# Patient Record
Sex: FEMALE | Race: OTHER | Employment: OTHER | URBAN - METROPOLITAN AREA
[De-identification: names, ages, dates, MRNs, and addresses within clinical notes are randomized per-mention and may not be internally consistent; named-entity substitution may affect disease eponyms.]

---

## 2017-01-26 DIAGNOSIS — M47.819 ARTHROPATHY OF FACET JOINT: ICD-10-CM

## 2017-01-26 DIAGNOSIS — M43.07 LUMBOSACRAL SPONDYLOLYSIS: Primary | ICD-10-CM

## 2017-01-26 DIAGNOSIS — M54.16 LUMBAR RADICULOPATHY: ICD-10-CM

## 2017-01-26 NOTE — TELEPHONE ENCOUNTER
Patient informed of 48 hour refill policy excluding weekends and holidays. Further explained patient will not receive a call back once prescription is ready. Justine-daughter will  medication at [place] office, [address].

## 2017-01-27 RX ORDER — HYDROCODONE BITARTRATE AND ACETAMINOPHEN 10; 325 MG/1; MG/1
1 TABLET ORAL EVERY 4 HOURS PRN
Qty: 120 TABLET | Refills: 0 | Status: SHIPPED | OUTPATIENT
Start: 2017-01-27 | End: 2017-02-23

## 2017-01-27 RX ORDER — MORPHINE SULFATE 15 MG/1
15 TABLET, FILM COATED, EXTENDED RELEASE ORAL EVERY 12 HOURS SCHEDULED
Qty: 60 TABLET | Refills: 0 | Status: SHIPPED | OUTPATIENT
Start: 2017-01-31 | End: 2017-02-23

## 2017-01-27 NOTE — TELEPHONE ENCOUNTER
Rx ready for , [address]     The following documentation was provided to patients at time of Rx pickup:      From the Office of Dr. Chelsi Erickson at [facility]:  [address] [facility] office is committed to providing the best care to our patients.    Due to

## 2017-01-27 NOTE — TELEPHONE ENCOUNTER
Medication: Morphine Sulfate ER 15 MG, Hydrocodone-Acetaminophen  MG    Date of last refill: Morphine 1/2/17,  Hydrocodone 12/24/16  [place] Reviewed Morphine 1/4/17, Hydrocodone 12/28/16    Last office visit: 9/22 using her cane for stability and I recommended that she moves about slowly and sit on the edge of her bed for a few minutes prior to walking about.     Radiology orders and consultations:None    The patient indicates understanding of these issues and agrees

## 2017-02-23 DIAGNOSIS — M47.819 ARTHROPATHY OF FACET JOINT: ICD-10-CM

## 2017-02-23 DIAGNOSIS — F11.90 NARCOTIC DRUG USE: ICD-10-CM

## 2017-02-23 DIAGNOSIS — M43.07 LUMBOSACRAL SPONDYLOLYSIS: Primary | ICD-10-CM

## 2017-02-23 DIAGNOSIS — M54.16 LUMBAR RADICULOPATHY: ICD-10-CM

## 2017-02-23 NOTE — TELEPHONE ENCOUNTER
Patient informed of 48 hour refill policy excluding weekends and holidays. Further explained patient will not receive a call back once prescription is ready. Pt's daughter will  medication at [place] office, [address].

## 2017-02-23 NOTE — TELEPHONE ENCOUNTER
Medication: Hydrocodone-Acetaminophen  MG, Morphine Sulfate ER 15 MG    Date of last refill: Hydrocodone 1/27/17,  Morphine 1/31/17  [place] Reviewed Hydrocodone 1/30/17,  Morphine 1/31/17    Last office visit: 9/2 continue using her cane for stability and I recommended that she moves about slowly and sit on the edge of her bed for a few minutes prior to walking about.     Radiology orders and consultations:None    The patient indicates understanding of these issues a

## 2017-02-24 RX ORDER — HYDROCODONE BITARTRATE AND ACETAMINOPHEN 10; 325 MG/1; MG/1
1 TABLET ORAL EVERY 6 HOURS PRN
Qty: 120 TABLET | Refills: 0 | Status: SHIPPED | OUTPATIENT
Start: 2017-02-28 | End: 2017-03-22

## 2017-02-24 RX ORDER — MORPHINE SULFATE 15 MG/1
15 TABLET, FILM COATED, EXTENDED RELEASE ORAL EVERY 12 HOURS SCHEDULED
Qty: 60 TABLET | Refills: 0 | Status: SHIPPED | OUTPATIENT
Start: 2017-03-01 | End: 2017-03-22

## 2017-02-24 NOTE — TELEPHONE ENCOUNTER
Pt need narcotic contract and urine drug screen- please let pt know she will need to see nurse to  rx. Pt needs date and time of last dose taken for norco and morphine ER. Order placed for UDS.

## 2017-02-27 ENCOUNTER — MED REC SCAN ONLY (OUTPATIENT)
Dept: SURGERY | Facility: CLINIC | Age: 82
End: 2017-02-27

## 2017-02-27 ENCOUNTER — APPOINTMENT (OUTPATIENT)
Dept: LAB | Age: 82
End: 2017-02-27
Attending: PHYSICIAN ASSISTANT
Payer: MEDICARE

## 2017-02-27 DIAGNOSIS — F11.90 NARCOTIC DRUG USE: ICD-10-CM

## 2017-02-27 PROCEDURE — 80307 DRUG TEST PRSMV CHEM ANLYZR: CPT

## 2017-02-27 NOTE — TELEPHONE ENCOUNTER
Medication: Gabapentin 300 MG    Date of last refill: 12/28/16  [place]    Last office visit: 9/22/16  Due back to clinic per last office note:  6 months  Date next office visit scheduled:  3/23/17    Last OV note rec minutes prior to walking about. Radiology orders and consultations:None    The patient indicates understanding of these issues and agrees to the plan. Return in about 6 months (around 3/22/2017). she is always welcome to return sooner if needed.     A

## 2017-02-27 NOTE — TELEPHONE ENCOUNTER
Signed narcotic contract with patient, verbalized understanding, copy of contract provided to patient. Pt reported taking Norco today at 12pm and Morphine yesterday 12am yesterday.

## 2017-03-02 LAB
6-ACETYLMORHINE CUTOFF 20 NG/ML: NOT DETECTED
7-AMINOCLONAZEPAM 40 NG/ML: NOT DETECTED
A-OH-ALPRAZOLM CUTOFF 20 NG/ML: NOT DETECTED
ALPRAZOLAM CUTOFF 40 NG/ML: NOT DETECTED
AMPHETAMINE CUTOFF 10 NG/ML: NOT DETECTED
BARBITURATES CUTOFF 200 NG/ML: NOT DETECTED
BENZOYLECGONINE  150 NG/ML: NOT DETECTED
BUPRENORPHINE CUTOFF 5 NG/ML: NOT DETECTED
CARISOPRODOL CUTOFF 100 NG/ML: NOT DETECTED
CODINE CUTOFF 40 NG/ML: NOT DETECTED
COLNAZEPAM CUTOFF 20 NG/ML: NOT DETECTED
CREATININE,URINE: 49.9 MG/DL
DIAZEPAM CUTOFF 50 NG/ML: NOT DETECTED
ETHYL-GLUCURONIDE 500 NG/ML: NOT DETECTED
FENTANYL CUTOFF 2 NG/ML: NOT DETECTED
HYDROCODONE CUTOFF 40 NG/ML: PRESENT
HYDROMORPHONE CUTOFF 40 NG/ML: NOT DETECTED
LORAZEPAM CUTOFF 60 NG/ML: NOT DETECTED
MARIJUANA CUTOFF 20 NG/ML: NOT DETECTED
MDA CUTOFF 200 NG/ML: NOT DETECTED
MDEA EVE CUTOFF 200 NG/ML: NOT DETECTED
MDMA-ECSTASY CUTOFF 200 NG/ML: NOT DETECTED
MEPERIDINE MET CUTOFF 50 NG/ML: NOT DETECTED
METHADONE CUTOFF 150 NG/ML: NOT DETECTED
METHAMPHETMN CUTOFF 400 NG/ML: NOT DETECTED
METHYLPHENIDATE (CUTOFF 100 NG/ML): NOT DETECTED
MIDAZOLAM CUTOFF 20 NG/ML: NOT DETECTED
MORHINE CUTOFF 20 NG/ML: PRESENT
NORBUPRENORPHINE  20 NG/ML: NOT DETECTED
NORDIAZEPAM CUTOFF 50 NG/ML: NOT DETECTED
NORFENTANYL CUTOFF 2 NG/ML: NOT DETECTED
NORHYDRCODONE CUTOFF 100 NG/ML: PRESENT
NOROXYCODONE CUTOFF 100 NG/ML: NOT DETECTED
NOROXYMORPHNE CUTOFF 100 NG/ML: NOT DETECTED
OXAZEPAM CUTOFF 50 NG/ML: NOT DETECTED
OXYCODONE CUTOFF 40 NG/ML: NOT DETECTED
OXYMORPHONE CUTOFF 40 NG/ML: NOT DETECTED
PCP CUTOFF 25 NG/ML: NOT DETECTED
PHENTERMINE CUTOFF 100 NG/ML: NOT DETECTED
PROPOXYPHENE CUTOFF 300 NG/ML: NOT DETECTED
TAPENTADOL CUTOFF 100 NG/ML: NOT DETECTED
TAPENTADOL-O SULF 200 NG/ML: NOT DETECTED
TEMAZEPAM CUTOFF 50 NG/ML: NOT DETECTED
TRAMADOL CUTOFF 200 NG/ML: NOT DETECTED
ZOLPIDEM CUTOFF 20 NG/ML: NOT DETECTED

## 2017-03-02 RX ORDER — GABAPENTIN 300 MG/1
CAPSULE ORAL
Qty: 90 CAPSULE | Refills: 0 | Status: SHIPPED | OUTPATIENT
Start: 2017-03-02 | End: 2017-03-22

## 2017-03-02 RX ORDER — GABAPENTIN 300 MG/1
CAPSULE ORAL
Qty: 90 CAPSULE | Refills: 0 | Status: SHIPPED | OUTPATIENT
Start: 2017-03-02 | End: 2017-03-30

## 2017-03-22 ENCOUNTER — OFFICE VISIT (OUTPATIENT)
Dept: SURGERY | Facility: CLINIC | Age: 82
End: 2017-03-22

## 2017-03-22 VITALS
SYSTOLIC BLOOD PRESSURE: 124 MMHG | DIASTOLIC BLOOD PRESSURE: 80 MMHG | RESPIRATION RATE: 16 BRPM | WEIGHT: 163 LBS | BODY MASS INDEX: 30 KG/M2 | HEIGHT: 62 IN | HEART RATE: 73 BPM

## 2017-03-22 DIAGNOSIS — M47.816 LUMBAR FACET ARTHROPATHY: ICD-10-CM

## 2017-03-22 DIAGNOSIS — M75.51 SUBDELTOID BURSITIS, RIGHT: ICD-10-CM

## 2017-03-22 DIAGNOSIS — M54.16 LUMBAR RADICULITIS: ICD-10-CM

## 2017-03-22 DIAGNOSIS — G58.8 INTERCOSTAL NEURITIS: Primary | ICD-10-CM

## 2017-03-22 DIAGNOSIS — M47.812 FACET ARTHROPATHY, CERVICAL: ICD-10-CM

## 2017-03-22 PROCEDURE — 99213 OFFICE O/P EST LOW 20 MIN: CPT | Performed by: NURSE PRACTITIONER

## 2017-03-22 RX ORDER — HYDROCODONE BITARTRATE AND ACETAMINOPHEN 10; 325 MG/1; MG/1
1 TABLET ORAL EVERY 6 HOURS PRN
Qty: 120 TABLET | Refills: 0 | Status: SHIPPED | OUTPATIENT
Start: 2017-03-29 | End: 2017-04-24

## 2017-03-22 RX ORDER — MORPHINE SULFATE 15 MG/1
15 TABLET, FILM COATED, EXTENDED RELEASE ORAL EVERY 12 HOURS SCHEDULED
Qty: 60 TABLET | Refills: 0 | Status: SHIPPED | OUTPATIENT
Start: 2017-03-30 | End: 2017-04-24

## 2017-03-22 RX ORDER — MORPHINE SULFATE 15 MG/1
15 TABLET, FILM COATED, EXTENDED RELEASE ORAL EVERY 12 HOURS SCHEDULED
Qty: 60 TABLET | Refills: 0 | Status: SHIPPED | OUTPATIENT
Start: 2017-03-30 | End: 2017-03-22

## 2017-03-22 NOTE — PATIENT INSTRUCTIONS
Refill policies:    • Allow 2 business days for refills; controlled substances may take longer.   • Contact your pharmacy at least 5 days prior to running out of medication and have them send an electronic request or submit request through the “request re your physician has recommended that you have a procedure or additional testing performed. [facility]) will contact your insurance carrier to obtain pre-certification or prior authorization.     Unfortunately, JEANNE has seen an increas

## 2017-03-26 NOTE — PROGRESS NOTES
Name: Shayy Celeste   : 1935   DOS: 3/26/2017     Pain Clinic Follow Up Visit:   Shayy Celeste is a 80year old female who presents for recheck of her chronic pain. She notes \"pain everywhere. \" She states her worst pain is in the neck, ri hours as needed for Pain. DO NOT FILL BEFORE  3/29/17 Disp: 120 tablet Rfl: 0   [START ON 3/30/2017] morphINE Sulfate ER 15 MG Oral Tab CR Take 1 tablet (15 mg total) by mouth every 12 (twelve) hours.  DO NOT FILL BEFORE 3/30/17 Disp: 60 tablet Rfl: 0   mary right with range of motion testing.       ASSESSMENT AND PLAN:   Intercostal neuritis  (primary encounter diagnosis)  Lumbar facet arthropathy  Lumbar radiculitis  Facet arthropathy, cervical  Subdeltoid bursitis, right     Treatment plan discussed with [facility] consultations:None    The patient indicates understanding of these issues and agrees to the plan. Return in about 6 months (around 9/22/2017), or if symptoms worsen or fail to improve.     DAJA Swenson, 3/26/2017, 10:02 AM

## 2017-03-27 ENCOUNTER — TELEPHONE (OUTPATIENT)
Dept: SURGERY | Facility: CLINIC | Age: 82
End: 2017-03-27

## 2017-03-27 NOTE — TELEPHONE ENCOUNTER
Trigger Point Injection    -procedure $157   -medication (methylprednisolone)-$98 based on age. Spoke to patient, advised total cost of TPI if not submitted to [facility] would be $255.  Patient requests office submit to [facility] to verify if they will pay, advise

## 2017-03-30 DIAGNOSIS — M43.07 LUMBOSACRAL SPONDYLOLYSIS: Primary | ICD-10-CM

## 2017-03-30 DIAGNOSIS — M54.16 LUMBAR RADICULOPATHY: ICD-10-CM

## 2017-03-30 RX ORDER — GABAPENTIN 300 MG/1
CAPSULE ORAL
Qty: 90 CAPSULE | Refills: 0 | Status: SHIPPED | OUTPATIENT
Start: 2017-03-30 | End: 2017-05-03

## 2017-03-30 NOTE — TELEPHONE ENCOUNTER
Medication: Gabapentin 300 mg     Date of last refill: 3/2/17  [place]    Last office visit: 3/22/17  Due back to clinic per last office note: 6 months   Date next office visit scheduled: None scheduled     Last OV no

## 2017-04-25 NOTE — TELEPHONE ENCOUNTER
Medication: Hydrocodone-Acetaminophen  MG,  Morphine Sulfate ER 15 MG    Date of last refill: both filled 3/30/17  [place] Reviewed both filled 3/30/17    Last office visit: 3/22/17  Due back to clinic per last off    Sig: Take 1 tablet (15 mg total) by mouth every 12 (twelve) hours. DO NOT FILL BEFORE 3/30/17            Risks and benefits of the medications were discussed with patient and her daughter today.  Patient should contact our office if this medication is

## 2017-04-26 RX ORDER — MORPHINE SULFATE 15 MG/1
15 TABLET, FILM COATED, EXTENDED RELEASE ORAL EVERY 12 HOURS SCHEDULED
Qty: 60 TABLET | Refills: 0 | Status: SHIPPED | OUTPATIENT
Start: 2017-04-28 | End: 2017-05-15

## 2017-04-26 RX ORDER — HYDROCODONE BITARTRATE AND ACETAMINOPHEN 10; 325 MG/1; MG/1
1 TABLET ORAL EVERY 6 HOURS PRN
Qty: 120 TABLET | Refills: 0 | Status: SHIPPED | OUTPATIENT
Start: 2017-04-28 | End: 2017-05-15

## 2017-05-03 NOTE — TELEPHONE ENCOUNTER
Medication: Gabapentin  Date of last refill: 3/30/17  [place]/A    Last office visit:3/22/17  Due back to clinic per last office note: 6 months  Date next office visit scheduled:  None Scheduled     Last OV note recomme (twelve) hours. DO NOT FILL BEFORE 3/30/17            Risks and benefits of the medications were discussed with patient and her daughter today. Patient should contact our office if this medication is not sufficient to help manage pain.      Radiology orders

## 2017-05-04 RX ORDER — GABAPENTIN 300 MG/1
CAPSULE ORAL
Qty: 90 CAPSULE | Refills: 0 | Status: SHIPPED | OUTPATIENT
Start: 2017-05-04 | End: 2017-06-01

## 2017-05-15 NOTE — TELEPHONE ENCOUNTER
Medication: Morphine Sulfate ER 15 MG,  Hydrocodone-Acetaminophen  MG    Date of last refill: both filled 4/28/17  [place] Reviewed Morphine Sulfate 3/30/17,  Norco 4/28/17    Last office visit: 3/22/17  Due back t Pain. DO NOT FILL BEFORE  3/29/17         morphINE Sulfate ER 15 MG Oral Tab CR  60 tablet  0       Sig: Take 1 tablet (15 mg total) by mouth every 12 (twelve) hours.  DO NOT FILL BEFORE 3/30/17            Risks and benefits of the medications were discusse

## 2017-05-16 RX ORDER — HYDROCODONE BITARTRATE AND ACETAMINOPHEN 10; 325 MG/1; MG/1
1 TABLET ORAL EVERY 6 HOURS PRN
Qty: 120 TABLET | Refills: 0 | Status: SHIPPED | OUTPATIENT
Start: 2017-05-27 | End: 2017-06-20

## 2017-05-16 RX ORDER — MORPHINE SULFATE 15 MG/1
15 TABLET, FILM COATED, EXTENDED RELEASE ORAL EVERY 12 HOURS SCHEDULED
Qty: 60 TABLET | Refills: 0 | Status: SHIPPED | OUTPATIENT
Start: 2017-05-16 | End: 2017-06-20

## 2017-05-16 NOTE — TELEPHONE ENCOUNTER
Rx's ready for pickup at  [address]      The following documentation was provided to patients at time of Rx pickup:      From the Office of Dr. Teagan Foreman at [facility]:  [address] [facility] office is committed to providing the best care to our patient

## 2017-06-01 NOTE — TELEPHONE ENCOUNTER
Medication: gabapentin 300 mg    Date of last refill: 5/4/17  [place]    Last office visit: 3/26/17  Due back to clinic per last office note: in 6 months (around 9/22/17) or if symptoms worsen  Date next office visit mg total) by mouth every 12 (twelve) hours. DO NOT FILL BEFORE 3/30/17            Risks and benefits of the medications were discussed with patient and her daughter today.  Patient should contact our office if this medication is not sufficient to help manag

## 2017-06-02 RX ORDER — GABAPENTIN 300 MG/1
CAPSULE ORAL
Qty: 90 CAPSULE | Refills: 0 | Status: SHIPPED | OUTPATIENT
Start: 2017-06-02 | End: 2017-06-27

## 2017-06-22 RX ORDER — HYDROCODONE BITARTRATE AND ACETAMINOPHEN 10; 325 MG/1; MG/1
1 TABLET ORAL EVERY 6 HOURS PRN
Qty: 120 TABLET | Refills: 0 | Status: SHIPPED | OUTPATIENT
Start: 2017-06-25 | End: 2017-07-21

## 2017-06-22 RX ORDER — MORPHINE SULFATE 15 MG/1
15 TABLET, FILM COATED, EXTENDED RELEASE ORAL EVERY 12 HOURS SCHEDULED
Qty: 60 TABLET | Refills: 0 | Status: SHIPPED | OUTPATIENT
Start: 2017-06-22 | End: 2017-07-21

## 2017-06-22 NOTE — TELEPHONE ENCOUNTER
Medication: Norco 10 mg and Morphine sulfate ER 15 mg    Date of last refill: Norco 5/27/17 and Morphine 5/16/17  [place] Reviewed Norco (5/27/17) and Morphine (3/30/17)    Last office visit: 3/22/17  Due back to clinic  morphINE Sulfate ER 15 MG Oral Tab CR  60 tablet  0       Sig: Take 1 tablet (15 mg total) by mouth every 12 (twelve) hours. DO NOT FILL BEFORE 3/30/17            Risks and benefits of the medications were discussed with patient and her daughter today.

## 2017-06-22 NOTE — TELEPHONE ENCOUNTER
Rxs ready for . The following documentation was provided to patients at time of Rx pickup:      [facility]  [address] [facility] office is committed to providing the best care to our patients.    Due to the high

## 2017-06-28 NOTE — TELEPHONE ENCOUNTER
Medication: Gabapentin 300 mg    Date of last refill: 6/2/17  [place]    Last office visit: 3/22/17  Due back to clinic per last office note: return in 6 months (around 9/22/17)  Date next office visit scheduled: 9/27 (twelve) hours. DO NOT FILL BEFORE 3/30/17           Risks and benefits of the medications were discussed with patient and her daughter today.  Patient should contact our office if this medication is not sufficient to help manage pain.      Radiology orders

## 2017-06-29 RX ORDER — GABAPENTIN 300 MG/1
CAPSULE ORAL
Qty: 90 CAPSULE | Refills: 0 | Status: SHIPPED | OUTPATIENT
Start: 2017-06-29 | End: 2017-07-21

## 2017-07-21 RX ORDER — MORPHINE SULFATE 15 MG/1
15 TABLET, FILM COATED, EXTENDED RELEASE ORAL EVERY 12 HOURS SCHEDULED
Qty: 60 TABLET | Refills: 0 | Status: SHIPPED | OUTPATIENT
Start: 2017-07-27 | End: 2017-08-23

## 2017-07-21 RX ORDER — HYDROCODONE BITARTRATE AND ACETAMINOPHEN 10; 325 MG/1; MG/1
1 TABLET ORAL EVERY 6 HOURS PRN
Qty: 120 TABLET | Refills: 0 | Status: SHIPPED | OUTPATIENT
Start: 2017-07-26 | End: 2017-08-23

## 2017-07-21 RX ORDER — GABAPENTIN 300 MG/1
CAPSULE ORAL
Qty: 90 CAPSULE | Refills: 0 | Status: SHIPPED | OUTPATIENT
Start: 2017-07-21 | End: 2017-08-23

## 2017-07-21 NOTE — TELEPHONE ENCOUNTER
Script ready for  at             The following documentation was provided to patients at time of Rx pickup:      From the Office of Dr. Justo Weber at [facility]:  [address] [facility] office is committed to providing the best care to our patie

## 2017-07-21 NOTE — TELEPHONE ENCOUNTER
Medication: Morphine Sulfate ER 15 MG, Hydrocodone-Acetaminophen  MG, Gabapentin 300 MG    Date of last refill: Morphine 6/22/17, Norco 6/25/17,  Gabapentin 300 MG 6/29/17  [place]): Reviewed Morphine 6/28/17,  Norco Pain. DO NOT FILL BEFORE  3/29/17        morphINE Sulfate ER 15 MG Oral Tab CR 60 tablet 0      Sig: Take 1 tablet (15 mg total) by mouth every 12 (twelve) hours.  DO NOT FILL BEFORE 3/30/17           Risks and benefits of the medications were discussed wit

## 2017-08-23 NOTE — TELEPHONE ENCOUNTER
Medication: Gabapentin 300mg, Hydrocodone-acetaminophen 10-325mg, morphine Sulfate 15mg    Date of last refill: Gabapentin 7/21/2017, Hydrocodone-acetaminophen 7/26/2017, Morphine 7/27/2017    [place] hydrocodone reviewe Oral Tab 120 tablet 0      Sig: Take 1 tablet by mouth every 6 (six) hours as needed for Pain. DO NOT FILL BEFORE  3/29/17        morphINE Sulfate ER 15 MG Oral Tab CR 60 tablet 0      Sig: Take 1 tablet (15 mg total) by mouth every 12 (twelve) hours.  DO N

## 2017-08-24 RX ORDER — HYDROCODONE BITARTRATE AND ACETAMINOPHEN 10; 325 MG/1; MG/1
1 TABLET ORAL EVERY 6 HOURS PRN
Qty: 120 TABLET | Refills: 0 | Status: SHIPPED | OUTPATIENT
Start: 2017-08-25 | End: 2017-09-13

## 2017-08-24 RX ORDER — GABAPENTIN 300 MG/1
CAPSULE ORAL
Qty: 90 CAPSULE | Refills: 0 | Status: SHIPPED | OUTPATIENT
Start: 2017-08-24 | End: 2017-09-27

## 2017-08-24 RX ORDER — MORPHINE SULFATE 15 MG/1
15 TABLET, FILM COATED, EXTENDED RELEASE ORAL EVERY 12 HOURS SCHEDULED
Qty: 60 TABLET | Refills: 0 | Status: SHIPPED | OUTPATIENT
Start: 2017-08-29 | End: 2017-09-13

## 2017-08-24 NOTE — TELEPHONE ENCOUNTER
RX ready for     The following documentation was provided to patients at time of Rx pickup:      From the Office of Dr. Jerris Cranker at [facility]:  [address] [facility] office is committed to providing the best care to our patients.    Due to the high vol

## 2017-09-13 ENCOUNTER — OFFICE VISIT (OUTPATIENT)
Dept: SURGERY | Facility: CLINIC | Age: 82
End: 2017-09-13

## 2017-09-13 VITALS
HEIGHT: 62 IN | SYSTOLIC BLOOD PRESSURE: 136 MMHG | DIASTOLIC BLOOD PRESSURE: 76 MMHG | HEART RATE: 78 BPM | WEIGHT: 165 LBS | BODY MASS INDEX: 30.36 KG/M2

## 2017-09-13 DIAGNOSIS — M46.1 SACROILIITIS (HCC): ICD-10-CM

## 2017-09-13 DIAGNOSIS — G58.8 INTERCOSTAL NEURALGIA: ICD-10-CM

## 2017-09-13 DIAGNOSIS — M47.816 LUMBAR FACET ARTHROPATHY: ICD-10-CM

## 2017-09-13 DIAGNOSIS — Z91.89 STROKE RISK: ICD-10-CM

## 2017-09-13 DIAGNOSIS — R51.9 NONINTRACTABLE HEADACHE, UNSPECIFIED CHRONICITY PATTERN, UNSPECIFIED HEADACHE TYPE: Primary | ICD-10-CM

## 2017-09-13 DIAGNOSIS — M54.16 LUMBAR RADICULOPATHY: ICD-10-CM

## 2017-09-13 PROCEDURE — 99213 OFFICE O/P EST LOW 20 MIN: CPT | Performed by: NURSE PRACTITIONER

## 2017-09-13 RX ORDER — HYDROCODONE BITARTRATE AND ACETAMINOPHEN 10; 325 MG/1; MG/1
1 TABLET ORAL EVERY 6 HOURS PRN
Qty: 120 TABLET | Refills: 0 | Status: SHIPPED | OUTPATIENT
Start: 2017-09-13 | End: 2017-10-13

## 2017-09-13 RX ORDER — MORPHINE SULFATE 15 MG/1
15 TABLET, FILM COATED, EXTENDED RELEASE ORAL EVERY 12 HOURS SCHEDULED
Qty: 60 TABLET | Refills: 0 | Status: SHIPPED | OUTPATIENT
Start: 2017-09-13 | End: 2017-10-13

## 2017-09-13 NOTE — PROGRESS NOTES
Name: Shawna Linares   : 1935   DOS: 2017     Pain Clinic Follow Up Visit:   Shawna Linares is a [de-identified]year old female who presents for recheck of her chronic low back pain.   Patient continues to experience bilateral lumbar and sacroiliac a sacroiliac area pain, left anterior rib pain. Neurologic: Right sided head pain. Denies weakness or bowel/bladder incontinence associated with the pain.       Current Outpatient Prescriptions:  morphINE Sulfate ER 15 MG Oral Tab CR Take 1 tablet (15 mg to mood.  HEENT: No tenderness to palpation over occipital area bilaterally or over the right frontal-parietal area. Skin over the skull is intact. No bruising or swelling is palpated on examination today.   Back/Trunk: Tenderness to palpation bilateral lumbar for treatment of pain [facility])         Risks and benefits of the medications were discussed with patient today. Patient should contact our office if this medication is not sufficient to help manage pain.      Radiology orders and consultations:NEURO - INT

## 2017-09-13 NOTE — PATIENT INSTRUCTIONS
Refill policies:    • Allow 2-3 business days for refills; controlled substances may take longer.   • Contact your pharmacy at least 5 days prior to running out of medication and have them send an electronic request or submit request through the [facility] have a procedure or additional testing performed. [facility]) will contact your insurance carrier to obtain pre-certification or prior authorization.     Unfortunately, JEANNE has seen an increase in denial of payment even though the p

## 2017-09-15 ENCOUNTER — TELEPHONE (OUTPATIENT)
Dept: SURGERY | Facility: CLINIC | Age: 82
End: 2017-09-15

## 2017-09-27 RX ORDER — GABAPENTIN 300 MG/1
CAPSULE ORAL
Qty: 90 CAPSULE | Refills: 5 | Status: SHIPPED | OUTPATIENT
Start: 2017-09-27 | End: 2017-10-26

## 2017-09-27 NOTE — TELEPHONE ENCOUNTER
Medication: Gabapentin 300mg    Date of last refill: 8/24/2017  [place] n[place]a    Last office visit: 9/13/2017  Due back to clinic per last office note:  Return in about 6 months (around 3/13/2018), or if symptoms worsen or times daily for treatment of pain [facility])           Risks and benefits of the medications were discussed with patient today.  Patient should contact our office if this medication is not sufficient to help manage pain.      Radiology orders and consultati

## 2017-10-17 ENCOUNTER — OFFICE VISIT (OUTPATIENT)
Dept: NEUROLOGY | Facility: CLINIC | Age: 82
End: 2017-10-17

## 2017-10-17 VITALS
BODY MASS INDEX: 30 KG/M2 | RESPIRATION RATE: 16 BRPM | DIASTOLIC BLOOD PRESSURE: 66 MMHG | WEIGHT: 166 LBS | SYSTOLIC BLOOD PRESSURE: 118 MMHG | HEART RATE: 68 BPM

## 2017-10-17 DIAGNOSIS — R47.9 TRANSIENT SPEECH DISTURBANCE: Primary | ICD-10-CM

## 2017-10-17 DIAGNOSIS — Z86.73 HISTORY OF STROKE: ICD-10-CM

## 2017-10-17 DIAGNOSIS — R29.90 STROKE-LIKE SYMPTOM: ICD-10-CM

## 2017-10-17 PROCEDURE — 99204 OFFICE O/P NEW MOD 45 MIN: CPT | Performed by: OTHER

## 2017-10-17 RX ORDER — LEVOTHYROXINE SODIUM 0.12 MG/1
125 TABLET ORAL DAILY
COMMUNITY
Start: 2017-09-27 | End: 2020-06-23

## 2017-10-17 RX ORDER — ASPIRIN 81 MG/1
81 TABLET ORAL DAILY
Qty: 30 TABLET | Refills: 3 | Status: SHIPPED | OUTPATIENT
Start: 2017-10-17 | End: 2018-06-08

## 2017-10-17 NOTE — PROGRESS NOTES
Neurology H&P    Deb Corley Patient Status:  No patient class for patient encounter    1935 MRN [id number]   Location [address] [place] [address], [address] [place] [address] Drive, [address] [facility] Attending No att. providers found   [address] Day # 0 PCP Paty Fernandez MD     [place] areas) three to four times daily (max 10.4ml daily) (Diclof) AND dispense Lidocaine 2.5%, Prilocaine 2.5% cream #480 grams.   Apply 4 grams three to four times daily for treatment of pain [facility]) Disp: 1 each Rfl: 11   aspirin 81 MG Oral Chew Tab Chew 81 Smoker                                                              Smokeless tobacco: Never Used                      Alcohol use: No                Family History:  No family history on file.     ROS:  Gen: no unexplained weight loss  Vision: no vision ch stiff gait, unable to toe or heel walk due to pain, unsteady tandem    Labs:       Imaging:  No rtecent CNS imaging    Assessment: This is an [de-identified] y/o female with multiple medical problems including previous stroke and vascular risk factors.  She has an exam

## 2017-10-17 NOTE — PROGRESS NOTES
Pt sent by Umesh Dias for evaluation of intermittent head pain on right side of head. Pt reports sudden onset of sharp stabbing pain that lasts only seconds, but causes vision \"black out,\" dizziness, and loss of balance.   Pt states it has been happenin

## 2017-10-23 ENCOUNTER — HOSPITAL ENCOUNTER (OUTPATIENT)
Dept: MRI IMAGING | Age: 82
Discharge: HOME | End: 2017-10-23
Attending: Other
Payer: MEDICARE

## 2017-10-23 ENCOUNTER — HOSPITAL ENCOUNTER (OUTPATIENT)
Dept: ULTRASOUND IMAGING | Age: 82
Discharge: HOME | End: 2017-10-23
Attending: Other
Payer: MEDICARE

## 2017-10-23 DIAGNOSIS — R47.9 TRANSIENT SPEECH DISTURBANCE: ICD-10-CM

## 2017-10-23 DIAGNOSIS — Z86.73 HISTORY OF STROKE: ICD-10-CM

## 2017-10-23 DIAGNOSIS — R29.90 STROKE-LIKE SYMPTOM: ICD-10-CM

## 2017-10-23 PROCEDURE — 70551 MRI BRAIN STEM W/O DYE: CPT | Performed by: OTHER

## 2017-10-23 PROCEDURE — 93880 EXTRACRANIAL BILAT STUDY: CPT | Performed by: OTHER

## 2017-10-24 ENCOUNTER — TELEPHONE (OUTPATIENT)
Dept: NEUROLOGY | Facility: CLINIC | Age: 82
End: 2017-10-24

## 2017-10-24 NOTE — TELEPHONE ENCOUNTER
Medication: Hydrocodone-acetaminophen 10-325mg, Morphine 15mg    Date of last refill: hydrocodone-acetaminophen 9/13/2017, morphine 9/13/2017    [place]): Hydrocodone-- reviewed 9/27/2017, Morphine reviewed 7/31/2017    La times daily (max 10.4ml daily) (Diclof) AND dispense Lidocaine 2.5%, Prilocaine 2.5% cream #480 grams.   Apply 4 grams three to four times daily for treatment of pain [facility])           Risks and benefits of the medications were discussed with patient tod

## 2017-10-24 NOTE — TELEPHONE ENCOUNTER
Please inform pt that last Gabapentin Rx was written with refills and pt just needs to call pharmacy to pick that up.

## 2017-10-24 NOTE — TELEPHONE ENCOUNTER
Patient notified of results below. She verbalized understanding. No further questions or concerns at this time.

## 2017-10-24 NOTE — TELEPHONE ENCOUNTER
----- Message from Vesta Hodges DO sent at 10/24/2017  8:26 AM CDT -----  Carotid US reports reviewed. <50% stenosis b/l ICAs.  No need for any acute intervention

## 2017-10-25 NOTE — TELEPHONE ENCOUNTER
Script ready for  at   RN NEEDED        The following documentation was provided to patients at time of Rx pickup:      From the Office of Dr. Vaibhav Bowden at [facility]:  [address] [facility] office is committed to providing the best care to ou

## 2017-10-25 NOTE — TELEPHONE ENCOUNTER
Please call pt and let her know she is not filling the morphine 15 mg ER regularly, if she does not need it she should not be taking it not as prescribed.  I understand there is a cost issue but it's not safe given pt is [de-identified] y.o and she has not filled the la

## 2017-10-26 ENCOUNTER — APPOINTMENT (OUTPATIENT)
Dept: LAB | Age: 82
End: 2017-10-26
Attending: Other
Payer: MEDICARE

## 2017-10-26 DIAGNOSIS — Z79.891 LONG TERM CURRENT USE OF OPIATE ANALGESIC: ICD-10-CM

## 2017-10-26 DIAGNOSIS — F11.90 NARCOTIC DRUG USE: ICD-10-CM

## 2017-10-26 PROCEDURE — 80307 DRUG TEST PRSMV CHEM ANLYZR: CPT

## 2017-10-26 RX ORDER — GABAPENTIN 300 MG/1
CAPSULE ORAL
Qty: 90 CAPSULE | Refills: 5 | Status: CANCELLED | OUTPATIENT
Start: 2017-10-26

## 2017-10-26 NOTE — TELEPHONE ENCOUNTER
Pt here in office for Rx pickup. Narcotic agreement already on file. States last dose of  vicodin was today at 11 am.  Pt sent to lab for urine specimen. Pt to return to pickup Rx.

## 2017-10-27 RX ORDER — GABAPENTIN 300 MG/1
CAPSULE ORAL
Qty: 90 CAPSULE | Refills: 5 | Status: SHIPPED | OUTPATIENT
Start: 2017-10-27 | End: 2017-12-28

## 2017-10-27 NOTE — TELEPHONE ENCOUNTER
Medication: gabapentin    Date of last refill: 9/27  [place]    Last office visit: 9/13  Due back to clinic per last office note:  3/2018  Date next office visit scheduled:  na    Last OV note recommendation:      Refer

## 2017-11-27 NOTE — TELEPHONE ENCOUNTER
Medication: Hydrocodone-acetaminophen 10-325mg    Date of last refill: 10/26/2017  [place] reviewed 25/14/1991    Last office visit: 9/13/2017  Due back to clinic per last office note:  Return in about 6 months (around 3 #480 grams. Apply 4 grams three to four times daily for treatment of pain [facility])           Risks and benefits of the medications were discussed with patient today.  Patient should contact our office if this medication is not sufficient to help manage p

## 2017-11-28 RX ORDER — HYDROCODONE BITARTRATE AND ACETAMINOPHEN 10; 325 MG/1; MG/1
1 TABLET ORAL EVERY 6 HOURS PRN
Qty: 120 TABLET | Refills: 0 | Status: SHIPPED | OUTPATIENT
Start: 2017-11-28 | End: 2017-12-26

## 2017-11-28 NOTE — TELEPHONE ENCOUNTER
Script ready for  at           The following documentation was provided to patients at time of Rx pickup:      From the Office of Dr. Teagan Foreman at [facility]:  [address] [facility] office is committed to providing the best care to our patient

## 2017-12-19 ENCOUNTER — OFFICE VISIT (OUTPATIENT)
Dept: NEUROLOGY | Facility: CLINIC | Age: 82
End: 2017-12-19

## 2017-12-19 VITALS
HEART RATE: 74 BPM | SYSTOLIC BLOOD PRESSURE: 150 MMHG | DIASTOLIC BLOOD PRESSURE: 96 MMHG | HEIGHT: 63 IN | RESPIRATION RATE: 16 BRPM | BODY MASS INDEX: 30.65 KG/M2 | WEIGHT: 173 LBS

## 2017-12-19 DIAGNOSIS — R47.9 TRANSIENT SPEECH DISTURBANCE: ICD-10-CM

## 2017-12-19 DIAGNOSIS — M54.16 LUMBAR RADICULOPATHY: ICD-10-CM

## 2017-12-19 DIAGNOSIS — Z86.73 HISTORY OF STROKE: ICD-10-CM

## 2017-12-19 DIAGNOSIS — M53.3 SACROILIAC JOINT DYSFUNCTION: ICD-10-CM

## 2017-12-19 DIAGNOSIS — M43.07 LUMBOSACRAL SPONDYLOLYSIS: ICD-10-CM

## 2017-12-19 DIAGNOSIS — R29.90 STROKE-LIKE SYMPTOM: ICD-10-CM

## 2017-12-19 DIAGNOSIS — M47.819 ARTHROPATHY OF FACET JOINT: ICD-10-CM

## 2017-12-19 PROCEDURE — 99213 OFFICE O/P EST LOW 20 MIN: CPT | Performed by: OTHER

## 2017-12-19 RX ORDER — ASPIRIN 81 MG/1
81 TABLET ORAL DAILY
Qty: 30 TABLET | Refills: 3 | Status: CANCELLED | OUTPATIENT
Start: 2017-12-19

## 2017-12-19 RX ORDER — OMEPRAZOLE 40 MG/1
CAPSULE, DELAYED RELEASE ORAL
COMMUNITY
Start: 2017-11-30 | End: 2019-02-01

## 2017-12-19 RX ORDER — SIMVASTATIN 10 MG
10 TABLET ORAL NIGHTLY
Qty: 90 TABLET | Refills: 3 | Status: SHIPPED | OUTPATIENT
Start: 2017-12-19 | End: 2020-06-23

## 2017-12-19 RX ORDER — ASPIRIN 81 MG/1
81 TABLET ORAL DAILY
Qty: 90 TABLET | Refills: 3 | Status: SHIPPED | OUTPATIENT
Start: 2017-12-19 | End: 2021-08-05

## 2017-12-19 NOTE — PATIENT INSTRUCTIONS
Refill policies:    • Allow 2-3 business days for refills; controlled substances may take longer.   • Contact your pharmacy at least 5 days prior to running out of medication and have them send an electronic request or submit request through the [facility] have a procedure or additional testing performed. [facility]) will contact your insurance carrier to obtain pre-certification or prior authorization.     Unfortunately, JEANNE has seen an increase in denial of payment even though the p

## 2017-12-19 NOTE — PROGRESS NOTES
Neurology H&P    Simeoncurt Vincent Patient Status:  No patient class for patient encounter    1935 MRN [id number]   Location ED [facility], [address], [address] [facility] Attending No att. providers found   [facility] Day # 0 PCP Yakelin Yun MD     [place]. She states that she never started this medication because she did not know that it was at the pharmacy. She denies any new stroke like symptoms. She denies nay numbness weakness or tingling. She denies any slurred speech or facial weakness.      Gautam Wang speech disturbance     Stroke-like symptom      PMHx:  Past Medical History:   Diagnosis Date   • Asthma    • [facility]    • GERD (gastroesophageal reflux disease)    • Hypothyroid    • MRSA (methicillin resistant staph aureus) culture positi or cyanosis      Neurologic:   MENTAL STATUS: alert, ox3, normal attention, language and fund of knowledge.       CRANIAL NERVES II to XII: PERRLA, no ptosis or diplopia, EOM intact, facial sensation intact, strong eye closure and lower facial muscles & jaw 10mg QHS  - Lipid panel was never done and I asked her to complete this again    Marcelo Holly, DO  Neurology

## 2017-12-26 NOTE — TELEPHONE ENCOUNTER
Medication: Hydrocodone-acetaminophen 10-325mg    Date of last refill: 11/28/2017  [place] reviewed 46/67/4018    Last office visit: 9/13/2017  Due back to clinic per last office note:  Return in about 6 months (around 3 cream #480 grams. Apply 4 grams three to four times daily for treatment of pain [facility])           Risks and benefits of the medications were discussed with patient today.  Patient should contact our office if this medication is not sufficient to help ma

## 2017-12-27 RX ORDER — HYDROCODONE BITARTRATE AND ACETAMINOPHEN 10; 325 MG/1; MG/1
1 TABLET ORAL EVERY 6 HOURS PRN
Qty: 120 TABLET | Refills: 0 | Status: SHIPPED | OUTPATIENT
Start: 2017-12-29 | End: 2018-01-24

## 2017-12-27 NOTE — TELEPHONE ENCOUNTER
Script ready for  at           The following documentation was provided to patients at time of Rx pickup:      From the Office of Dr. Velia Schultz at [facility]:  [address] [facility] office is committed to providing the best care to our patient

## 2017-12-29 ENCOUNTER — TELEPHONE (OUTPATIENT)
Dept: SURGERY | Facility: CLINIC | Age: 82
End: 2017-12-29

## 2017-12-29 NOTE — TELEPHONE ENCOUNTER
No answer. Calling Pharmacy to check on number of refills left. Original script given in October had 5 refills so there should still be 2 refills left.

## 2018-01-02 RX ORDER — GABAPENTIN 300 MG/1
CAPSULE ORAL
Qty: 90 CAPSULE | Refills: 5 | Status: SHIPPED | OUTPATIENT
Start: 2018-01-02 | End: 2018-01-24

## 2018-01-02 NOTE — TELEPHONE ENCOUNTER
Medication: Gabapentin 300mg    Date of last refill: 10/27/2017  [place]    Last office visit: 9/13/2017  Due back to clinic per last office note:  Return in about 6 months (around 3/13/2018), or if symptoms worsen o three to four times daily for treatment of pain [facility])           Risks and benefits of the medications were discussed with patient today.  Patient should contact our office if this medication is not sufficient to help manage pain.      Radiology orders

## 2018-01-24 NOTE — TELEPHONE ENCOUNTER
Medication: Hydrocodone-acetaminophen 10-325mg, Gabapentin 300mg    Date of last refill: Hydrocodone 12/29/2017, Gabapentin 1/2/2018  [place] reviewed 88/24/2827    Last office visit: 9/13/2017  Due back to clinic per la AND dispense Lidocaine 2.5%, Prilocaine 2.5% cream #480 grams. Apply 4 grams three to four times daily for treatment of pain [facility])           Risks and benefits of the medications were discussed with patient today.  Patient should contact our office if

## 2018-01-25 RX ORDER — HYDROCODONE BITARTRATE AND ACETAMINOPHEN 10; 325 MG/1; MG/1
1 TABLET ORAL EVERY 6 HOURS PRN
Qty: 120 TABLET | Refills: 0 | Status: SHIPPED | OUTPATIENT
Start: 2018-01-28 | End: 2018-02-21

## 2018-01-25 RX ORDER — GABAPENTIN 300 MG/1
CAPSULE ORAL
Qty: 90 CAPSULE | Refills: 5 | Status: SHIPPED | OUTPATIENT
Start: 2018-01-25 | End: 2018-02-21

## 2018-01-25 NOTE — TELEPHONE ENCOUNTER
Script ready for  at  [address]        The following documentation was provided to patients at time of Rx pickup:      [facility]  [address] [facility] office is committed to providing the best care to our pa

## 2018-02-21 NOTE — TELEPHONE ENCOUNTER
Patient's daughter requesting Norco refill and gabapentin refill. Patient informed of 48 hour refill policy excluding weekends and holidays. Informed patient only patient can  the prescription effective April 1, 2017.   Further explained patient will

## 2018-02-21 NOTE — TELEPHONE ENCOUNTER
Medication: Gabapentin 300mg, Hydrocodone-acetaminophen 10/325mg    Date of last refill: Gabapentin 1/25/2018, Hydrocodone 1/28/2018  [place] reviewed Hydrocodone 1/31/2018    Last office visit: 9/13/2017  Due back to cl to 2 areas) three to four times daily (max 10.4ml daily) (Diclof) AND dispense Lidocaine 2.5%, Prilocaine 2.5% cream #480 grams.   Apply 4 grams three to four times daily for treatment of pain [facility])           Risks and benefits of the medications were

## 2018-02-23 RX ORDER — GABAPENTIN 300 MG/1
CAPSULE ORAL
Qty: 90 CAPSULE | Refills: 5 | Status: SHIPPED | OUTPATIENT
Start: 2018-02-23 | End: 2018-03-26

## 2018-02-23 RX ORDER — HYDROCODONE BITARTRATE AND ACETAMINOPHEN 10; 325 MG/1; MG/1
1 TABLET ORAL EVERY 6 HOURS PRN
Qty: 120 TABLET | Refills: 0 | Status: SHIPPED | OUTPATIENT
Start: 2018-03-01 | End: 2018-03-06

## 2018-02-23 NOTE — TELEPHONE ENCOUNTER
Prescription ready for     The following documentation was provided to patients at time of Rx pickup:    From the Office of Shaggy Cuellar and Viridiana Draper at [facility]:  [address] [facility] office is committed to providing the best care to our patients.    D

## 2018-03-06 ENCOUNTER — OFFICE VISIT (OUTPATIENT)
Dept: SURGERY | Facility: CLINIC | Age: 83
End: 2018-03-06

## 2018-03-06 VITALS
DIASTOLIC BLOOD PRESSURE: 84 MMHG | WEIGHT: 173 LBS | HEIGHT: 63 IN | BODY MASS INDEX: 30.65 KG/M2 | HEART RATE: 80 BPM | SYSTOLIC BLOOD PRESSURE: 152 MMHG

## 2018-03-06 DIAGNOSIS — M79.18 MYOFASCIAL PAIN: ICD-10-CM

## 2018-03-06 DIAGNOSIS — M46.1 SACROILIITIS (HCC): ICD-10-CM

## 2018-03-06 DIAGNOSIS — M47.816 LUMBAR FACET ARTHROPATHY: ICD-10-CM

## 2018-03-06 DIAGNOSIS — M47.812 ARTHROPATHY OF CERVICAL FACET JOINT: ICD-10-CM

## 2018-03-06 DIAGNOSIS — M70.61 GREATER TROCHANTERIC BURSITIS OF RIGHT HIP: ICD-10-CM

## 2018-03-06 DIAGNOSIS — M17.0 OSTEOARTHRITIS OF BOTH KNEES, UNSPECIFIED OSTEOARTHRITIS TYPE: ICD-10-CM

## 2018-03-06 DIAGNOSIS — M47.27 OSTEOARTHRITIS OF SPINE WITH RADICULOPATHY, LUMBOSACRAL REGION: ICD-10-CM

## 2018-03-06 DIAGNOSIS — M47.812 OSTEOARTHRITIS OF CERVICAL SPINE, UNSPECIFIED SPINAL OSTEOARTHRITIS COMPLICATION STATUS: Primary | ICD-10-CM

## 2018-03-06 PROCEDURE — 99214 OFFICE O/P EST MOD 30 MIN: CPT | Performed by: NURSE PRACTITIONER

## 2018-03-06 RX ORDER — HYDROCODONE BITARTRATE AND ACETAMINOPHEN 10; 325 MG/1; MG/1
1 TABLET ORAL EVERY 6 HOURS PRN
Qty: 120 TABLET | Refills: 0 | COMMUNITY
Start: 2018-03-06 | End: 2018-03-26

## 2018-03-06 RX ORDER — TIZANIDINE 2 MG/1
2 TABLET ORAL EVERY 8 HOURS PRN
Qty: 60 TABLET | Refills: 0 | Status: SHIPPED | OUTPATIENT
Start: 2018-03-06 | End: 2018-06-08

## 2018-03-06 NOTE — PROGRESS NOTES
Name: Winnie Sands   : 1935   DOS: 3/6/2018     Pain Clinic Follow Up Visit:   Winnie Sands is a [de-identified]year old female who presents for recheck of medications for her chronic low back, neck, right hip, and bilateral knee pain.  She is here wi yellow in color and now it is white. She asked her pharmacist who assured her it is still Norco but in fact a different .  She stated that he offerred to get her the previous  but it would take a couple of days so she tried the new o SR 24 Hr  Disp:  Rfl:    Albuterol Sulfate HFA (PROAIR HFA) 108 (90 BASE) MCG/ACT Inhalation Aero Soln Inhale 1 puff into the lungs every 6 (six) hours as needed for Wheezing.  Disp:  Rfl:    Losartan Potassium-HCTZ (HYZAAR) 50-12.5 MG Oral Tab Take 1 table hours if needed for pain. I have discussed with her that she should not take this medication if she does not need it, to limit side effects. She should notify our office if she continues to experience severe constipation or dizziness.  I have noted for next

## 2018-03-06 NOTE — PATIENT INSTRUCTIONS
Refill policies:    • Allow 2-3 business days for refills; controlled substances may take longer.   • Contact your pharmacy at least 5 days prior to running out of medication and have them send an electronic request or submit request through the [facility] recommended that you have a procedure or additional testing performed. [facility]) will contact your insurance carrier to obtain pre-certification or prior authorization.     Unfortunately, [facility] has seen an increase in denial of paym

## 2018-03-26 NOTE — TELEPHONE ENCOUNTER
Medication: Norco, gabapentin 300    Date of last refill: 3/6/18 Norco, WITH A FILL DATE OF 3/31/18   2/23/18 Gabapentin    [place] 0/0/53    Last office visit: 3/6/18  Due back to clinic per last office note:  Return in Oral Tab 60 tablet 0      Sig: Take 1 tablet (2 mg total) by mouth every 8 (eight) hours as needed (for muscle spasm).           Risks and benefits of the medications were discussed with patient today.  Patient should contact our office if this medication i

## 2018-03-27 NOTE — TELEPHONE ENCOUNTER
**RN NEDDED**    The following documentation was provided to patients at time of Rx pickup:    From the Office of Shaggy Cuellar and Viridiana Draper at [facility]:  [address] [facility] office is committed to providing the best care to our patients.    Due to the high vo

## 2018-03-27 NOTE — TELEPHONE ENCOUNTER
Please document last dose pain medication, have pt supply urine specimen prior to Rx pickup. Thank you.

## 2018-03-28 ENCOUNTER — APPOINTMENT (OUTPATIENT)
Dept: LAB | Age: 83
End: 2018-03-28
Attending: NURSE PRACTITIONER
Payer: MEDICARE

## 2018-03-28 DIAGNOSIS — Z79.891 LONG TERM CURRENT USE OF OPIATE ANALGESIC: ICD-10-CM

## 2018-03-28 DIAGNOSIS — F11.90 CHRONIC NARCOTIC USE: ICD-10-CM

## 2018-03-28 PROCEDURE — 80307 DRUG TEST PRSMV CHEM ANLYZR: CPT

## 2018-03-28 NOTE — TELEPHONE ENCOUNTER
Pt here in office for Rx pickup. Narcotic agreement already on file. States last dose of Norco was (today) 3/28/2018 at 8am.  Pt sent to lab for urine specimen. Pt to return to pickup Rx.

## 2018-04-24 RX ORDER — HYDROCODONE BITARTRATE AND ACETAMINOPHEN 10; 325 MG/1; MG/1
1 TABLET ORAL EVERY 6 HOURS PRN
Qty: 120 TABLET | Refills: 0 | Status: SHIPPED | OUTPATIENT
Start: 2018-04-25 | End: 2018-05-23

## 2018-04-25 NOTE — TELEPHONE ENCOUNTER
Prescription ready for     The following documentation was provided to patients at time of Rx pickup:    From the Office of Shaggy Condon and Millicent Maki at [facility]:  [address] [facility] office is committed to providing the best care to our patients.    D

## 2018-05-23 NOTE — TELEPHONE ENCOUNTER
Refill 2 SCRIPTS: Norco & gabapentin      Patient informed of 48 hour refill policy excluding weekends and holidays. Informed patient only patient can  the prescription effective April 1, 2017.   Further explained patient will not receive a call back

## 2018-05-24 RX ORDER — HYDROCODONE BITARTRATE AND ACETAMINOPHEN 10; 325 MG/1; MG/1
1 TABLET ORAL EVERY 6 HOURS PRN
Qty: 120 TABLET | Refills: 0 | Status: SHIPPED | OUTPATIENT
Start: 2018-06-02 | End: 2018-06-26

## 2018-05-24 RX ORDER — GABAPENTIN 300 MG/1
CAPSULE ORAL
Qty: 90 CAPSULE | Refills: 0 | Status: SHIPPED | OUTPATIENT
Start: 2018-05-24 | End: 2018-06-08 | Stop reason: DRUGHIGH

## 2018-05-24 NOTE — TELEPHONE ENCOUNTER
Medication: HYDROcodone-acetaminophen  MG and gabapentin 300 MG    **Patient should still have refills of gabapentin**    Date of last refill:HYDROcodone-acetaminophen  4/25/18, gabapentin 300 MG- 3/27/18 w/5 refills     [place]  Signed Prescriptions Disp Refills    TiZANidine HCl 2 MG Oral Tab 60 tablet 0      Sig: Take 1 tablet (2 mg total) by mouth every 8 (eight) hours as needed (for muscle spasm).            Risks and benefits of the medications were discussed with tip

## 2018-05-25 ENCOUNTER — TELEPHONE (OUTPATIENT)
Dept: SURGERY | Facility: CLINIC | Age: 83
End: 2018-05-25

## 2018-05-25 DIAGNOSIS — M79.672 FOOT PAIN, BILATERAL: Primary | ICD-10-CM

## 2018-05-25 DIAGNOSIS — M79.671 FOOT PAIN, BILATERAL: Primary | ICD-10-CM

## 2018-05-25 DIAGNOSIS — M25.571 BILATERAL ANKLE PAIN, UNSPECIFIED CHRONICITY: ICD-10-CM

## 2018-05-25 DIAGNOSIS — M25.572 BILATERAL ANKLE PAIN, UNSPECIFIED CHRONICITY: ICD-10-CM

## 2018-06-07 ENCOUNTER — HOSPITAL ENCOUNTER (OUTPATIENT)
Dept: GENERAL RADIOLOGY | Facility: HOSPITAL | Age: 83
Discharge: HOME | End: 2018-06-07
Attending: NURSE PRACTITIONER
Payer: MEDICARE

## 2018-06-07 DIAGNOSIS — M25.571 BILATERAL ANKLE PAIN, UNSPECIFIED CHRONICITY: ICD-10-CM

## 2018-06-07 DIAGNOSIS — M25.572 BILATERAL ANKLE PAIN, UNSPECIFIED CHRONICITY: ICD-10-CM

## 2018-06-07 DIAGNOSIS — M79.672 FOOT PAIN, BILATERAL: ICD-10-CM

## 2018-06-07 DIAGNOSIS — M79.671 FOOT PAIN, BILATERAL: ICD-10-CM

## 2018-06-07 PROCEDURE — 73610 X-RAY EXAM OF ANKLE: CPT | Performed by: NURSE PRACTITIONER

## 2018-06-07 PROCEDURE — 73630 X-RAY EXAM OF FOOT: CPT | Performed by: NURSE PRACTITIONER

## 2018-06-08 ENCOUNTER — OFFICE VISIT (OUTPATIENT)
Dept: SURGERY | Facility: CLINIC | Age: 83
End: 2018-06-08

## 2018-06-08 VITALS
SYSTOLIC BLOOD PRESSURE: 110 MMHG | HEART RATE: 68 BPM | WEIGHT: 178 LBS | HEIGHT: 59.06 IN | BODY MASS INDEX: 35.88 KG/M2 | DIASTOLIC BLOOD PRESSURE: 68 MMHG

## 2018-06-08 DIAGNOSIS — M19.071 OSTEOARTHRITIS OF BOTH ANKLES, UNSPECIFIED OSTEOARTHRITIS TYPE: Primary | ICD-10-CM

## 2018-06-08 DIAGNOSIS — M19.071 OSTEOARTHRITIS OF BOTH FEET, UNSPECIFIED OSTEOARTHRITIS TYPE: ICD-10-CM

## 2018-06-08 DIAGNOSIS — M72.2 BILATERAL PLANTAR FASCIITIS: ICD-10-CM

## 2018-06-08 DIAGNOSIS — M19.072 OSTEOARTHRITIS OF BOTH ANKLES, UNSPECIFIED OSTEOARTHRITIS TYPE: Primary | ICD-10-CM

## 2018-06-08 DIAGNOSIS — M19.072 OSTEOARTHRITIS OF BOTH FEET, UNSPECIFIED OSTEOARTHRITIS TYPE: ICD-10-CM

## 2018-06-08 PROBLEM — M25.579 JOINT PAIN OF ANKLE AND FOOT, UNSPECIFIED LATERALITY: Status: ACTIVE | Noted: 2018-06-08

## 2018-06-08 PROCEDURE — 99214 OFFICE O/P EST MOD 30 MIN: CPT | Performed by: NURSE PRACTITIONER

## 2018-06-08 RX ORDER — TIZANIDINE 2 MG/1
2 TABLET ORAL EVERY 8 HOURS PRN
Qty: 60 TABLET | Refills: 0 | Status: SHIPPED | OUTPATIENT
Start: 2018-06-08 | End: 2018-10-29

## 2018-06-08 RX ORDER — GABAPENTIN 400 MG/1
400 CAPSULE ORAL 3 TIMES DAILY
Qty: 90 CAPSULE | Refills: 0 | Status: SHIPPED | OUTPATIENT
Start: 2018-06-08 | End: 2018-07-08

## 2018-06-08 NOTE — PATIENT INSTRUCTIONS
Refill policies:    • Allow 2-3 business days for refills; controlled substances may take longer.   • Contact your pharmacy at least 5 days prior to running out of medication and have them send an electronic request or submit request through the “request re entire amount billed. Precertification and Prior Authorizations: If your physician has recommended that you have a procedure or additional testing performed.   [facility]) will contact your insurance carrier to obtain pre-certi

## 2018-06-08 NOTE — PROGRESS NOTES
Name: Jeanne Hankins   : 1935   DOS: 2018     Pain Clinic Follow Up Visit:   Jeanne Hankins is a 82year old female who presents for recheck of her chronic low back pain.   She has more recently been experiencing bilateral ankle and foot p muscle spasm). Disp: 60 tablet Rfl: 0   HYDROcodone-acetaminophen  MG Oral Tab Take 1 tablet by mouth every 6 (six) hours as needed for Pain.  DO NOT FILL UNTIL 6/2/2018 Disp: 120 tablet Rfl: 0   Omeprazole 40 MG Oral Capsule Delayed Release  Disp:  R worse on the medial side. X-rays as noted below were discussed with patient today. PROCEDURE:  XR FOOT, COMPLETE (MIN 3 VIEWS), LEFT (CPT=73630)     TECHNIQUE:  AP, oblique, and lateral views were obtained. COMPARISON:  None. INDICATIONS:  M79. along the tip of the lateral malleolus suggesting remote ligamentous injury. Dictated by: aMlaika Puri DO on 6/07/2018 at 11:39       Approved by:  Qian Vázquez DO         PROCEDURE:  XR ANKLE (MIN 3 VIEWS) LEFT (CPT=73610)     TECHNIQUE:  Three process.      Dictated by: Wisam Duggan MD on 6/07/2018 at 12:13       Approved by: Wisam Duggan MD              ASSESSMENT AND PLAN:   Osteoarthritis of both ankles, unspecified osteoarthritis type  (primary encounter diagnosis)  Osteoarthritis of omar consultations:None     She would like to consult with a podiatrist for a second opinion regarding her foot pain.   She states that she was recommended orthopedic shoes in the past.  She has contact information for a podiatrist whom she would like to schedul

## 2018-06-26 RX ORDER — HYDROCODONE BITARTRATE AND ACETAMINOPHEN 10; 325 MG/1; MG/1
1 TABLET ORAL EVERY 6 HOURS PRN
Qty: 120 TABLET | Refills: 0 | Status: SHIPPED | OUTPATIENT
Start: 2018-07-04 | End: 2018-07-23

## 2018-06-26 NOTE — TELEPHONE ENCOUNTER
Medication: HYDROcodone-acetaminophen  MG Oral Tab    Date of last refill: written 5/24/18 to fill 6/2/18  [place] 8/0/43 ET verified    Last office visit: 6/8/18  Due back to clinic per last office note: Return in including the risk for oversedation, overdose, and death.   Patient should contact our office if this medication is not sufficient to help manage pain.      We are happy to order physical therapy, she prefers to defer at this time.      Radiology orders and

## 2018-06-26 NOTE — TELEPHONE ENCOUNTER
[place] refill  Patient informed of 48 hour refill policy excluding weekends and holidays. Informed patient only patient can  the prescription effective April 1, 2017. Further explained patient will not receive a call back once prescription is ready.

## 2018-07-23 NOTE — TELEPHONE ENCOUNTER
Medication: HYDROcodone-acetaminophen  MG    Date of last refill: 7/4/18    [place] 4/6/57    Last office visit: 6/8/18  Due back to clinic per last office note:  3 months (around 9/8/2018).   Date next office visit death.   Patient should contact our office if this medication is not sufficient to help manage pain.      We are happy to order physical therapy, she prefers to defer at this time.      Radiology orders and consultations:None      She would like to consult

## 2018-07-24 RX ORDER — HYDROCODONE BITARTRATE AND ACETAMINOPHEN 10; 325 MG/1; MG/1
1 TABLET ORAL EVERY 6 HOURS PRN
Qty: 120 TABLET | Refills: 0 | Status: SHIPPED | OUTPATIENT
Start: 2018-08-03 | End: 2018-08-23

## 2018-08-23 RX ORDER — HYDROCODONE BITARTRATE AND ACETAMINOPHEN 10; 325 MG/1; MG/1
1 TABLET ORAL EVERY 6 HOURS PRN
Qty: 120 TABLET | Refills: 0 | Status: SHIPPED | OUTPATIENT
Start: 2018-09-01 | End: 2018-09-24

## 2018-08-23 NOTE — TELEPHONE ENCOUNTER
Medication: HYDROcodone-acetaminophen  MG Oral Tab  pt's daughter is req refill for Norco    Date of last refill:written 7/24/18 to fill 8/3/18  [place] 4/5/34 ET anthony on site    Last office visit: 6/8/18  Due back muscle spasm).           Risks and benefits of the medications including risks for dizziness or sleepiness related to gabapentin and tizanidine were discussed with patient today.   She is aware of risks related to opioid medications including the risk for o

## 2018-08-31 ENCOUNTER — OFFICE VISIT (OUTPATIENT)
Dept: PAIN CLINIC | Facility: CLINIC | Age: 83
End: 2018-08-31
Payer: MEDICARE

## 2018-08-31 VITALS
OXYGEN SATURATION: 95 % | HEIGHT: 61 IN | SYSTOLIC BLOOD PRESSURE: 120 MMHG | DIASTOLIC BLOOD PRESSURE: 78 MMHG | HEART RATE: 68 BPM

## 2018-08-31 DIAGNOSIS — M25.551 BILATERAL HIP PAIN: ICD-10-CM

## 2018-08-31 DIAGNOSIS — R10.30 LOWER ABDOMINAL PAIN: ICD-10-CM

## 2018-08-31 DIAGNOSIS — Z86.39 HISTORY OF THYROID DISORDER: ICD-10-CM

## 2018-08-31 DIAGNOSIS — M54.16 LUMBAR RADICULOPATHY: ICD-10-CM

## 2018-08-31 DIAGNOSIS — M25.552 BILATERAL HIP PAIN: ICD-10-CM

## 2018-08-31 DIAGNOSIS — M72.2 PLANTAR FASCIITIS OF LEFT FOOT: Primary | ICD-10-CM

## 2018-08-31 DIAGNOSIS — M43.07 LUMBOSACRAL SPONDYLOLYSIS: ICD-10-CM

## 2018-08-31 DIAGNOSIS — R63.5 WEIGHT GAIN: ICD-10-CM

## 2018-08-31 DIAGNOSIS — Z79.891 LONG TERM (CURRENT) USE OF OPIATE ANALGESIC: ICD-10-CM

## 2018-08-31 PROCEDURE — 99214 OFFICE O/P EST MOD 30 MIN: CPT | Performed by: NURSE PRACTITIONER

## 2018-08-31 NOTE — PROGRESS NOTES
Name: Berta Mosley   : 1935   DOS: 2018     Pain Clinic Follow Up Visit:   Berta Mosley is a 82year old female who presents for recheck of her chronic low back, bilateral hip and knee pain left greater than right, and left foot pain 81 MG Oral Tab EC Take 1 tablet (81 mg total) by mouth daily. Disp: 90 tablet Rfl: 3   simvastatin 10 MG Oral Tab Take 1 tablet (10 mg total) by mouth nightly. Disp: 90 tablet Rfl: 3   Levothyroxine Sodium 125 MCG Oral Tab Take 125 mcg by mouth daily.  Disp discussed the procedure, risks, and benefits with patient today. She would like the procedure performed in the hospital with twilight sedation.      Given that she is tolerating Norco and is it helping her pain, I have recommended she continue Norco 10/325m

## 2018-08-31 NOTE — PATIENT INSTRUCTIONS
Refill policies:    • Allow 2-3 business days for refills; controlled substances may take longer.   • Contact your pharmacy at least 5 days prior to running out of medication and have them send an electronic request or submit request through the “request re entire amount billed. Precertification and Prior Authorizations: If your physician has recommended that you have a procedure or additional testing performed.   [facility]) will contact your insurance carrier to obtain pre-certi procedure if you are experiencing any symptoms of infection such as cough, fever, chills, urinary symptoms, or have recently been prescribed antibiotics, have open wounds, have recently had surgery or dental procedures.      As you will be unable to shower days  • Eliquis (Apixaban) 3 days  • Xarelto (Rivaroxaban) 3 days  • Lovenox (Enoxaparin) 24 hours  • Aspirin  • 81mg 24 hours  • Greater than 81 mg (325mg) 7 days  • Coumadin       5 days  • Procedure may be cancelled if INR is elevated.    • Excedrin (wit CANCELLATION AND/OR RESCHEDULING: PLEASE CALL [facility] PRE-PROCEDURE LINE [phone] FOR DETAILED INSTRUCTIONS FIVE TO SEVEN DAYS PRIOR TO PROCEDURE**

## 2018-09-01 ENCOUNTER — TELEPHONE (OUTPATIENT)
Dept: PAIN CLINIC | Facility: CLINIC | Age: 83
End: 2018-09-01

## 2018-09-01 DIAGNOSIS — M72.2 PLANTAR FASCIITIS OF LEFT FOOT: Primary | ICD-10-CM

## 2018-09-01 PROBLEM — M79.672 BILATERAL FOOT PAIN: Status: ACTIVE | Noted: 2018-09-01

## 2018-09-01 PROBLEM — M25.551 BILATERAL HIP PAIN: Status: ACTIVE | Noted: 2018-09-01

## 2018-09-01 PROBLEM — M79.671 BILATERAL FOOT PAIN: Status: ACTIVE | Noted: 2018-09-01

## 2018-09-01 PROBLEM — M25.552 BILATERAL HIP PAIN: Status: ACTIVE | Noted: 2018-09-01

## 2018-09-04 ENCOUNTER — TELEPHONE (OUTPATIENT)
Dept: PAIN CLINIC | Facility: CLINIC | Age: 83
End: 2018-09-04

## 2018-09-04 DIAGNOSIS — M54.16 LUMBAR RADICULOPATHY: ICD-10-CM

## 2018-09-04 DIAGNOSIS — M43.07 LUMBOSACRAL SPONDYLOLYSIS: Primary | ICD-10-CM

## 2018-09-04 NOTE — TELEPHONE ENCOUNTER
Case scheduled    [address]  PRE-PROCEDURE INSTRUCTIONS WITH IV SEDATION        Appointment Date: 10/9/18           Check-In Time: 9:45am

## 2018-09-24 NOTE — TELEPHONE ENCOUNTER
Medication: HYDROcodone-acetaminophen     Date of last refill: 9/01/18    [place] 1/4/61    Last office visit: 8/31/18  Due back to clinic per last office note:  3 months (around 11/30/2018),  Date next office visi importance of following up with gastroenterologist, which she agreed to do.     Radiology orders and consultations:ENDOCRINOLOGY - INTERNAL     The patient indicates understanding of these issues and agrees to the plan.   Return in about 3 months (around 6

## 2018-09-25 RX ORDER — HYDROCODONE BITARTRATE AND ACETAMINOPHEN 10; 325 MG/1; MG/1
1 TABLET ORAL EVERY 6 HOURS PRN
Qty: 120 TABLET | Refills: 0 | Status: SHIPPED | OUTPATIENT
Start: 2018-10-04 | End: 2018-10-26

## 2018-09-25 NOTE — TELEPHONE ENCOUNTER
Prescription ready for , [place] [address]    The following documentation was provided to patients at time of Rx pickup:    From the Office of Shaggy Mckeon and Ez Cordova at [facility]:  [address] [facility] office is committed to providing the best care to o

## 2018-10-02 ENCOUNTER — TELEPHONE (OUTPATIENT)
Dept: SURGERY | Facility: CLINIC | Age: 83
End: 2018-10-02

## 2018-10-05 ENCOUNTER — TELEPHONE (OUTPATIENT)
Dept: SURGERY | Facility: CLINIC | Age: 83
End: 2018-10-05

## 2018-10-05 NOTE — TELEPHONE ENCOUNTER
Spoke to patient, advised of need to reschedule 10/9 procedure. Patient states she needs to reschedule anyway, is having issues with her bladder and does not know when she will be able to reschedule.  Advised patient will cancel 10/9 procedure, patient enco

## 2018-10-26 RX ORDER — HYDROCODONE BITARTRATE AND ACETAMINOPHEN 10; 325 MG/1; MG/1
1 TABLET ORAL EVERY 6 HOURS PRN
Qty: 120 TABLET | Refills: 0 | Status: SHIPPED | OUTPATIENT
Start: 2018-11-06 | End: 2018-11-26

## 2018-10-26 NOTE — TELEPHONE ENCOUNTER
Medication: HYDROcodone-acetaminophen  MG     Date of last refill: 10/4/18    [place] 10/8/47    Last office visit: 8/31/18  Due back to clinic per last office note:  about 3 months (around 11/30/2018),  Date next the importance of following up with gastroenterologist, which she agreed to do.     Radiology orders and consultations:ENDOCRINOLOGY - INTERNAL     The patient indicates understanding of these issues and agrees to the plan.   Return in about 3 months (butch

## 2018-10-29 ENCOUNTER — OFFICE VISIT (OUTPATIENT)
Dept: PAIN CLINIC | Facility: CLINIC | Age: 83
End: 2018-10-29
Payer: MEDICARE

## 2018-10-29 VITALS
HEIGHT: 62 IN | WEIGHT: 173 LBS | HEART RATE: 74 BPM | OXYGEN SATURATION: 98 % | SYSTOLIC BLOOD PRESSURE: 121 MMHG | BODY MASS INDEX: 31.83 KG/M2 | DIASTOLIC BLOOD PRESSURE: 79 MMHG

## 2018-10-29 DIAGNOSIS — M54.16 LUMBAR RADICULOPATHY: ICD-10-CM

## 2018-10-29 DIAGNOSIS — M47.812 ARTHROPATHY OF CERVICAL FACET JOINT: ICD-10-CM

## 2018-10-29 DIAGNOSIS — M43.07 LUMBOSACRAL SPONDYLOLYSIS: ICD-10-CM

## 2018-10-29 DIAGNOSIS — M47.812 SPONDYLOSIS OF CERVICAL REGION WITHOUT MYELOPATHY OR RADICULOPATHY: ICD-10-CM

## 2018-10-29 DIAGNOSIS — M79.18 MYOFASCIAL PAIN: Primary | ICD-10-CM

## 2018-10-29 PROCEDURE — 99213 OFFICE O/P EST LOW 20 MIN: CPT | Performed by: NURSE PRACTITIONER

## 2018-10-29 RX ORDER — TIZANIDINE 2 MG/1
TABLET ORAL
Qty: 120 TABLET | Refills: 0 | Status: SHIPPED | OUTPATIENT
Start: 2018-10-29 | End: 2019-02-01

## 2018-10-29 RX ORDER — GABAPENTIN 300 MG/1
CAPSULE ORAL
Qty: 90 CAPSULE | Refills: 0 | Status: SHIPPED | OUTPATIENT
Start: 2018-10-29 | End: 2018-12-14

## 2018-10-29 NOTE — PATIENT INSTRUCTIONS
Refill policies:    • Allow 2-3 business days for refills; controlled substances may take longer.   • Contact your pharmacy at least 5 days prior to running out of medication and have them send an electronic request or submit request through the “request re entire amount billed. Precertification and Prior Authorizations: If your physician has recommended that you have a procedure or additional testing performed.   [facility]) will contact your insurance carrier to obtain pre-certi

## 2018-10-29 NOTE — PROGRESS NOTES
Name: Priya Martin   : 1935   DOS: 10/29/2018     Pain Clinic Follow Up Visit:   Priya Martin is a 82year old female who presents for medication recheck of her chronic low back pain which has been shooting up the back to the neck and hea Oral Tab Take 1 tablet by mouth daily. Disp: 30 tablet Rfl: 0   [START ON 11/6/2018] HYDROcodone-acetaminophen  MG Oral Tab Take 1 tablet by mouth every 6 (six) hours as needed for Pain.  DO NOT FILL UNTIL 11/6/2018 Disp: 120 tablet Rfl: 0   Omeprazol gabapentin 300 mg 3 times daily  Continue Movantik 12.5 mg daily for constipation  Tizanidine 2 mg may try to increase to 1-2 tablets every 8 hours if needed for muscle spasm.   I have discussed risks for dizziness and sleepiness with regard to this medicat

## 2018-10-30 ENCOUNTER — TELEPHONE (OUTPATIENT)
Dept: PAIN CLINIC | Facility: CLINIC | Age: 83
End: 2018-10-30

## 2018-10-30 DIAGNOSIS — M79.18 MYOFASCIAL PAIN: Primary | ICD-10-CM

## 2018-10-30 DIAGNOSIS — M54.16 LUMBAR RADICULOPATHY: ICD-10-CM

## 2018-10-30 NOTE — TELEPHONE ENCOUNTER
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 Please let me know if it is not approved.    Thanks

## 2018-10-30 NOTE — TELEPHONE ENCOUNTER
Pharmacy requesting prior authorization for Movantik 12.5mg. Referral placed. Attempted prior authorization via [employer], immediate response received, Movantik is a plan exclusion under patient's coverage, medication not covered.

## 2018-11-23 NOTE — TELEPHONE ENCOUNTER
Sam Sorensen, per comments, medication not covered. Would like an alternate plan of care? Thank you.

## 2018-11-26 RX ORDER — HYDROCODONE BITARTRATE AND ACETAMINOPHEN 10; 325 MG/1; MG/1
1 TABLET ORAL EVERY 6 HOURS PRN
Qty: 120 TABLET | Refills: 0 | Status: SHIPPED | OUTPATIENT
Start: 2018-12-06 | End: 2018-12-28

## 2018-11-26 NOTE — TELEPHONE ENCOUNTER
Medication: HYDROcodone-acetaminophen     Date of last refill: 11/6/18    [place] 73/3/81    Last office visit: 10/29/18  Due back to clinic per last office note:  3 months (around 1/29/2019).   Date next office vis follow-up with Dr. Anel Urias and neurology as recommended     Continue follow-up with primary care provider regarding left breast pain and hernia.     Radiology orders and consultations:None     The patient indicates understanding of these issues and agrees

## 2018-11-26 NOTE — TELEPHONE ENCOUNTER
I'm sorry I didn't clarify, Senokot-S can be obtained over the counter I believe, she should ask her pharmacist for the location or another acceptable alternative. Thanks!

## 2018-11-26 NOTE — TELEPHONE ENCOUNTER
Patient states she is agreeable to trying an alternative to Movantik- Senokot-S. She states she is not able to pay out of pocket for Movantik.      Forwarding to provider to order medication     [address] [place] [address], [place], [place]

## 2018-12-10 RX ORDER — GABAPENTIN 300 MG/1
CAPSULE ORAL
Qty: 90 CAPSULE | Refills: 0 | OUTPATIENT
Start: 2018-12-10

## 2018-12-10 NOTE — TELEPHONE ENCOUNTER
Medication: gabapentin 300 MG Oral Cap    Date of last refill: 10/29/18  [place] 23/5/14 ET anthony      Last office visit: 10/29/18  Due back to clinic per last office note:  Return in about 3 months (around 1/29/2019)  Diego pain.      Continue follow-up with Dr. Juan Cazares and neurology as recommended     Continue follow-up with primary care provider regarding left breast pain and hernia.     Radiology orders and consultations:None     The patient indicates understanding of the

## 2018-12-14 RX ORDER — GABAPENTIN 300 MG/1
CAPSULE ORAL
Qty: 90 CAPSULE | Refills: 0 | Status: SHIPPED | OUTPATIENT
Start: 2018-12-14 | End: 2018-12-28

## 2018-12-17 RX ORDER — GABAPENTIN 300 MG/1
CAPSULE ORAL
Qty: 90 CAPSULE | Refills: 0 | OUTPATIENT
Start: 2018-12-17

## 2018-12-27 NOTE — TELEPHONE ENCOUNTER
2 SCRIPTS: norco & gabapentin      Patient informed of 48 hour refill policy excluding weekends and holidays. Informed patient only patient can  the prescription effective April 1, 2017.   Further explained patient will not receive a call back once p

## 2018-12-28 RX ORDER — HYDROCODONE BITARTRATE AND ACETAMINOPHEN 10; 325 MG/1; MG/1
1 TABLET ORAL EVERY 6 HOURS PRN
Qty: 120 TABLET | Refills: 0 | Status: SHIPPED | OUTPATIENT
Start: 2018-12-28 | End: 2019-01-21

## 2018-12-28 RX ORDER — GABAPENTIN 300 MG/1
CAPSULE ORAL
Qty: 90 CAPSULE | Refills: 0 | Status: SHIPPED | OUTPATIENT
Start: 2018-12-28 | End: 2019-01-22

## 2018-12-28 NOTE — TELEPHONE ENCOUNTER
2 REFILL REQUESTS    Medication:   HYDROcodone-acetaminophen  MG Oral Tab    And    gabapentin 300 MG Oral Cap    Date of last refill: HYDROcodone-acetaminophen  MG Oral Tab (12/6/18)    AND     gabapentin 300 MG Oral Cap (12/14/18)    Date las tablet 0       Sig: Take 1 tablet by mouth daily.      Risks and benefits of the medications were discussed with patient today.  Patient should contact our office if this medication is not sufficient to help manage pain.      Continue follow-up with Dr. Rodrigo Antoine

## 2019-01-21 DIAGNOSIS — F11.90 CHRONIC NARCOTIC USE: ICD-10-CM

## 2019-01-21 DIAGNOSIS — M47.819 ARTHROPATHY OF FACET JOINT: ICD-10-CM

## 2019-01-21 DIAGNOSIS — M43.07 LUMBOSACRAL SPONDYLOLYSIS: ICD-10-CM

## 2019-01-21 DIAGNOSIS — M54.16 LUMBAR RADICULOPATHY: Primary | ICD-10-CM

## 2019-01-21 DIAGNOSIS — Z79.891 LONG TERM CURRENT USE OF OPIATE ANALGESIC: ICD-10-CM

## 2019-01-21 NOTE — TELEPHONE ENCOUNTER
2 SCRIPTS: norco & gabapentin (not needed until beginning of Feb)      Patient informed of 48 hour refill policy excluding weekends and holidays. Informed patient only patient can  the prescription effective April 1, 2017.   Further explained patient

## 2019-01-23 RX ORDER — GABAPENTIN 300 MG/1
CAPSULE ORAL
Qty: 90 CAPSULE | Refills: 0 | Status: SHIPPED | OUTPATIENT
Start: 2019-01-23 | End: 2019-02-01

## 2019-01-23 RX ORDER — HYDROCODONE BITARTRATE AND ACETAMINOPHEN 10; 325 MG/1; MG/1
1 TABLET ORAL EVERY 6 HOURS PRN
Qty: 120 TABLET | Refills: 0 | Status: SHIPPED | OUTPATIENT
Start: 2019-02-05 | End: 2019-02-26

## 2019-02-01 ENCOUNTER — OFFICE VISIT (OUTPATIENT)
Dept: PAIN CLINIC | Facility: CLINIC | Age: 84
End: 2019-02-01
Payer: MEDICARE

## 2019-02-01 ENCOUNTER — APPOINTMENT (OUTPATIENT)
Dept: LAB | Age: 84
End: 2019-02-01
Attending: NURSE PRACTITIONER
Payer: MEDICARE

## 2019-02-01 VITALS
BODY MASS INDEX: 31.28 KG/M2 | DIASTOLIC BLOOD PRESSURE: 70 MMHG | WEIGHT: 170 LBS | HEART RATE: 86 BPM | SYSTOLIC BLOOD PRESSURE: 130 MMHG | OXYGEN SATURATION: 96 % | HEIGHT: 62 IN

## 2019-02-01 DIAGNOSIS — K59.03 THERAPEUTIC OPIOID-INDUCED CONSTIPATION (OIC): ICD-10-CM

## 2019-02-01 DIAGNOSIS — M17.12 OSTEOARTHRITIS OF LEFT KNEE, UNSPECIFIED OSTEOARTHRITIS TYPE: ICD-10-CM

## 2019-02-01 DIAGNOSIS — M47.819 ARTHROPATHY OF FACET JOINT: ICD-10-CM

## 2019-02-01 DIAGNOSIS — N89.8 VAGINAL DISCHARGE: ICD-10-CM

## 2019-02-01 DIAGNOSIS — Z79.891 LONG TERM (CURRENT) USE OF OPIATE ANALGESIC: Primary | ICD-10-CM

## 2019-02-01 DIAGNOSIS — M43.07 LUMBOSACRAL SPONDYLOLYSIS: ICD-10-CM

## 2019-02-01 DIAGNOSIS — M54.16 LUMBAR RADICULOPATHY: ICD-10-CM

## 2019-02-01 DIAGNOSIS — T40.2X5A THERAPEUTIC OPIOID-INDUCED CONSTIPATION (OIC): ICD-10-CM

## 2019-02-01 PROCEDURE — 80307 DRUG TEST PRSMV CHEM ANLYZR: CPT | Performed by: NURSE PRACTITIONER

## 2019-02-01 PROCEDURE — 99214 OFFICE O/P EST MOD 30 MIN: CPT | Performed by: NURSE PRACTITIONER

## 2019-02-01 RX ORDER — OMEPRAZOLE 20 MG/1
CAPSULE, DELAYED RELEASE ORAL
COMMUNITY
Start: 2019-01-07

## 2019-02-01 RX ORDER — GABAPENTIN 300 MG/1
CAPSULE ORAL
Qty: 90 CAPSULE | Refills: 0 | Status: SHIPPED | OUTPATIENT
Start: 2019-02-01 | End: 2019-02-04

## 2019-02-01 RX ORDER — TIZANIDINE 2 MG/1
TABLET ORAL
Qty: 120 TABLET | Refills: 0 | Status: SHIPPED | OUTPATIENT
Start: 2019-02-01 | End: 2019-02-04

## 2019-02-01 NOTE — PROGRESS NOTES
Name: Katherine Villanueva   : 1935   DOS: 2019     Pain Clinic Follow Up Visit:   Katherine Villanueva is a 82year old female who presents for medication recheck for her chronic low back pain sacral area which is bilateral radiating down legs to to Disp: 90 capsule Rfl: 0   TiZANidine HCl 2 MG Oral Tab Take one to two tablets every 8 hours if needed for muscle spasm.  Disp: 120 tablet Rfl: 0   omeprazole 20 MG Oral Capsule Delayed Release  Disp:  Rfl:    HYDROcodone-acetaminophen  MG Oral Tab Ta interested in injection procedures at this time. Continue hydrocodone/acetaminophen 10/325mg one tablet every 6 hours if needed for pain.    Medications filled today:  Requested Prescriptions     Signed Prescriptions Disp Refills   • Naloxegol Oxalate (MOV

## 2019-02-03 LAB
6-ACETYLMORHINE CUTOFF 20 NG/ML: NOT DETECTED
7-AMINOCLONAZEPAM 40 NG/ML: NOT DETECTED
A-OH-ALPRAZOLM CUTOFF 20 NG/ML: NOT DETECTED
ALPRAZOLAM CUTOFF 40 NG/ML: NOT DETECTED
AMPHETAMINE CUTOFF 10 NG/ML: NOT DETECTED
BARBITURATES CUTOFF 200 NG/ML: NOT DETECTED
BENZOYLECGONINE  150 NG/ML: NOT DETECTED
BUPRENORPHINE CUTOFF 5 NG/ML: NOT DETECTED
CARISOPRODOL CUTOFF 100 NG/ML: NOT DETECTED
CODINE CUTOFF 40 NG/ML: NOT DETECTED
COLNAZEPAM CUTOFF 20 NG/ML: NOT DETECTED
CREATININE,URINE: 125.1 MG/DL
DIAZEPAM CUTOFF 50 NG/ML: NOT DETECTED
ETHYL-GLUCURONIDE 500 NG/ML: NOT DETECTED
FENTANYL CUTOFF 2 NG/ML: NOT DETECTED
HYDROCODONE CUTOFF 40 NG/ML: PRESENT
HYDROMORPHONE CUTOFF 40 NG/ML: NOT DETECTED
LORAZEPAM CUTOFF 60 NG/ML: NOT DETECTED
MARIJUANA CUTOFF 20 NG/ML: NOT DETECTED
MDA CUTOFF 200 NG/ML: NOT DETECTED
MDEA EVE CUTOFF 200 NG/ML: NOT DETECTED
MDMA-ECSTASY CUTOFF 200 NG/ML: NOT DETECTED
MEPERIDINE MET CUTOFF 50 NG/ML: NOT DETECTED
METHADONE CUTOFF 150 NG/ML: NOT DETECTED
METHAMPHETMN CUTOFF 400 NG/ML: NOT DETECTED
METHYLPHENIDATE (CUTOFF 100 NG/ML): NOT DETECTED
MIDAZOLAM CUTOFF 20 NG/ML: NOT DETECTED
MORHINE CUTOFF 20 NG/ML: NOT DETECTED
NORBUPRENORPHINE  20 NG/ML: NOT DETECTED
NORDIAZEPAM CUTOFF 50 NG/ML: NOT DETECTED
NORFENTANYL CUTOFF 2 NG/ML: NOT DETECTED
NORHYDRCODONE CUTOFF 100 NG/ML: PRESENT
NOROXYCODONE CUTOFF 100 NG/ML: NOT DETECTED
NOROXYMORPHNE CUTOFF 100 NG/ML: NOT DETECTED
OXAZEPAM CUTOFF 50 NG/ML: NOT DETECTED
OXYCODONE CUTOFF 40 NG/ML: NOT DETECTED
OXYMORPHONE CUTOFF 40 NG/ML: NOT DETECTED
PCP CUTOFF 25 NG/ML: NOT DETECTED
PHENTERMINE CUTOFF 100 NG/ML: NOT DETECTED
PROPOXYPHENE CUTOFF 300 NG/ML: NOT DETECTED
TAPENTADOL CUTOFF 100 NG/ML: NOT DETECTED
TAPENTADOL-O SULF 200 NG/ML: NOT DETECTED
TEMAZEPAM CUTOFF 50 NG/ML: NOT DETECTED
TRAMADOL CUTOFF 200 NG/ML: NOT DETECTED
ZOLPIDEM CUTOFF 20 NG/ML: NOT DETECTED

## 2019-02-04 ENCOUNTER — TELEPHONE (OUTPATIENT)
Dept: PAIN CLINIC | Facility: CLINIC | Age: 84
End: 2019-02-04

## 2019-02-04 DIAGNOSIS — M54.16 LUMBAR RADICULOPATHY: ICD-10-CM

## 2019-02-04 DIAGNOSIS — M47.819 ARTHROPATHY OF FACET JOINT: ICD-10-CM

## 2019-02-04 DIAGNOSIS — M43.07 LUMBOSACRAL SPONDYLOLYSIS: ICD-10-CM

## 2019-02-04 RX ORDER — TIZANIDINE 2 MG/1
TABLET ORAL
Qty: 120 TABLET | Refills: 0 | Status: SHIPPED | OUTPATIENT
Start: 2019-02-04 | End: 2019-10-30

## 2019-02-04 RX ORDER — GABAPENTIN 300 MG/1
CAPSULE ORAL
Qty: 90 CAPSULE | Refills: 0 | Status: SHIPPED | OUTPATIENT
Start: 2019-02-04 | End: 2019-02-26

## 2019-02-25 DIAGNOSIS — M43.07 LUMBOSACRAL SPONDYLOLYSIS: ICD-10-CM

## 2019-02-25 DIAGNOSIS — M54.16 LUMBAR RADICULOPATHY: ICD-10-CM

## 2019-02-25 DIAGNOSIS — M47.819 ARTHROPATHY OF FACET JOINT: ICD-10-CM

## 2019-02-26 NOTE — TELEPHONE ENCOUNTER
Medication: HYDROcodone-acetaminophen  MG Oral Tab    Date of last refill: 2/6/2019  [place] 2/6/4302     verified on site    Medication: gabapentin 300 MG Oral Cap    Date of last refill: 2/4/19  Date last fille

## 2019-02-27 RX ORDER — GABAPENTIN 300 MG/1
CAPSULE ORAL
Qty: 90 CAPSULE | Refills: 0 | Status: SHIPPED | OUTPATIENT
Start: 2019-02-27 | End: 2019-03-26

## 2019-02-27 RX ORDER — HYDROCODONE BITARTRATE AND ACETAMINOPHEN 10; 325 MG/1; MG/1
1 TABLET ORAL EVERY 6 HOURS PRN
Qty: 120 TABLET | Refills: 0 | Status: SHIPPED | OUTPATIENT
Start: 2019-03-07 | End: 2019-03-26

## 2019-03-24 NOTE — TELEPHONE ENCOUNTER
Kavya Pelletier, would you call Ms. Delores Osorio or her daughter to inform them of the potential cost for trigger point injection to the left external intercostal muscle and right subdeltoid bursa which would be done in the office.  They wanted to ask about cost pr Never smoker

## 2019-03-25 DIAGNOSIS — M47.819 ARTHROPATHY OF FACET JOINT: ICD-10-CM

## 2019-03-25 DIAGNOSIS — M43.07 LUMBOSACRAL SPONDYLOLYSIS: ICD-10-CM

## 2019-03-25 DIAGNOSIS — M54.16 LUMBAR RADICULOPATHY: ICD-10-CM

## 2019-03-25 NOTE — TELEPHONE ENCOUNTER
norco and gabapentin refill  Patient informed of 48 hour refill policy excluding weekends and holidays. Informed patient only patient can  the prescription effective April 1, 2017.   Further explained patient will not receive a call back once prescri

## 2019-03-26 RX ORDER — HYDROCODONE BITARTRATE AND ACETAMINOPHEN 10; 325 MG/1; MG/1
1 TABLET ORAL EVERY 6 HOURS PRN
Qty: 120 TABLET | Refills: 0 | Status: SHIPPED | OUTPATIENT
Start: 2019-04-05 | End: 2019-04-25

## 2019-03-26 RX ORDER — GABAPENTIN 300 MG/1
CAPSULE ORAL
Qty: 90 CAPSULE | Refills: 0 | Status: SHIPPED | OUTPATIENT
Start: 2019-03-26 | End: 2019-04-25

## 2019-03-26 NOTE — TELEPHONE ENCOUNTER
Called [facility] pharmacy and confirmed last dispense date of HYDROcodone-acetaminophen  MG Oral Tab  Was 3/7/19.  [place]

## 2019-03-26 NOTE — TELEPHONE ENCOUNTER
2 Rx requests    Medication:     HYDROcodone-acetaminophen  MG Oral Tab    And    gabapentin 300 MG Oral Cap      Date of last refill:     HYDROcodone-acetaminophen  MG Oral Tab (3/7/19)    And    gabapentin 300 MG Oral Cap (2/27/19)    Date la SUZAN Stewart, 2/1/2019, 8:38 AM

## 2019-04-24 DIAGNOSIS — M43.07 LUMBOSACRAL SPONDYLOLYSIS: ICD-10-CM

## 2019-04-24 DIAGNOSIS — M54.16 LUMBAR RADICULOPATHY: ICD-10-CM

## 2019-04-24 DIAGNOSIS — M47.819 ARTHROPATHY OF FACET JOINT: ICD-10-CM

## 2019-04-24 NOTE — TELEPHONE ENCOUNTER
Norco and gabapentin refills  Patient informed of 48 hour refill policy excluding weekends and holidays. Informed patient only patient can  the prescription effective April 1, 2017.   Further explained patient will not receive a call back once prescr

## 2019-04-25 RX ORDER — GABAPENTIN 300 MG/1
CAPSULE ORAL
Qty: 90 CAPSULE | Refills: 0 | Status: SHIPPED | OUTPATIENT
Start: 2019-04-25 | End: 2019-05-28

## 2019-04-25 RX ORDER — HYDROCODONE BITARTRATE AND ACETAMINOPHEN 10; 325 MG/1; MG/1
1 TABLET ORAL EVERY 6 HOURS PRN
Qty: 120 TABLET | Refills: 0 | Status: SHIPPED | OUTPATIENT
Start: 2019-05-04 | End: 2019-05-28

## 2019-04-25 NOTE — TELEPHONE ENCOUNTER
2 Rx requests    Medication:   HYDROcodone-acetaminophen  MG Oral Tab    And    gabapentin 300 MG Oral Cap    Date of last refill:     HYDROcodone-acetaminophen  MG Oral Tab (4/5/19)    And    gabapentin 300 MG Oral Cap (3/26/19)    Date last f

## 2019-05-01 ENCOUNTER — OFFICE VISIT (OUTPATIENT)
Dept: PAIN CLINIC | Facility: CLINIC | Age: 84
End: 2019-05-01
Payer: MEDICARE

## 2019-05-01 VITALS
WEIGHT: 170 LBS | SYSTOLIC BLOOD PRESSURE: 144 MMHG | BODY MASS INDEX: 31.28 KG/M2 | OXYGEN SATURATION: 95 % | DIASTOLIC BLOOD PRESSURE: 84 MMHG | HEART RATE: 74 BPM | HEIGHT: 62 IN

## 2019-05-01 DIAGNOSIS — M54.16 LUMBAR RADICULITIS: Primary | ICD-10-CM

## 2019-05-01 PROCEDURE — 99214 OFFICE O/P EST MOD 30 MIN: CPT | Performed by: ANESTHESIOLOGY

## 2019-05-04 NOTE — PROGRESS NOTES
Name: Taisha Anderson   : 1935   DOS: 2019    Pain Clinic Follow Up Visit:   Taisha Anderson is a 82year old female who presents for recheck of her chronic low back pain.  She is status post transforaminal lumbar epidural steroid and diagno MCG/ACT Inhalation Aero Soln Inhale 1 puff into the lungs every 6 (six) hours as needed for Wheezing. Disp:  Rfl:    Montelukast Sodium (SINGULAIR) 10 MG Oral Tab Take 10 mg by mouth nightly.  Disp:  Rfl:    Losartan Potassium-HCTZ (HYZAAR) 50-12.5 MG Oral

## 2019-05-24 ENCOUNTER — TELEPHONE (OUTPATIENT)
Dept: SURGERY | Facility: CLINIC | Age: 84
End: 2019-05-24

## 2019-05-24 DIAGNOSIS — M47.819 ARTHROPATHY OF FACET JOINT: ICD-10-CM

## 2019-05-24 DIAGNOSIS — M43.07 LUMBOSACRAL SPONDYLOLYSIS: ICD-10-CM

## 2019-05-24 DIAGNOSIS — M54.16 LUMBAR RADICULOPATHY: ICD-10-CM

## 2019-05-28 RX ORDER — GABAPENTIN 300 MG/1
CAPSULE ORAL
Qty: 90 CAPSULE | Refills: 0 | Status: SHIPPED | OUTPATIENT
Start: 2019-05-28 | End: 2019-06-24

## 2019-05-28 RX ORDER — HYDROCODONE BITARTRATE AND ACETAMINOPHEN 10; 325 MG/1; MG/1
1 TABLET ORAL EVERY 8 HOURS PRN
Qty: 90 TABLET | Refills: 0 | Status: SHIPPED | OUTPATIENT
Start: 2019-06-04 | End: 2019-06-24

## 2019-05-28 NOTE — TELEPHONE ENCOUNTER
Medication: HYDROcodone-acetaminophen  MG Oral Tab   Date of last refill: 5/6/19  [place] 1/0/13    Gabapentin 300 MG Oral Cap  Last refill - 4/25/19    Last office visit: 5/1/19  Due back to clinic per last office

## 2019-05-29 NOTE — TELEPHONE ENCOUNTER
Prescription ready for , [place] [id number]      The following documentation was provided to patients at time of Rx pickup:    From the Office of Shaggy Hidalgo and Sunshine Scales at [facility]:  [address] [facility] office is committed to providing the best care to

## 2019-06-24 DIAGNOSIS — M47.819 ARTHROPATHY OF FACET JOINT: ICD-10-CM

## 2019-06-24 DIAGNOSIS — M54.16 LUMBAR RADICULOPATHY: ICD-10-CM

## 2019-06-24 DIAGNOSIS — M43.07 LUMBOSACRAL SPONDYLOLYSIS: ICD-10-CM

## 2019-06-24 RX ORDER — HYDROCODONE BITARTRATE AND ACETAMINOPHEN 10; 325 MG/1; MG/1
1 TABLET ORAL EVERY 8 HOURS PRN
Qty: 90 TABLET | Refills: 0 | Status: SHIPPED | OUTPATIENT
Start: 2019-07-03 | End: 2019-06-26

## 2019-06-24 RX ORDER — GABAPENTIN 300 MG/1
CAPSULE ORAL
Qty: 90 CAPSULE | Refills: 0 | Status: SHIPPED | OUTPATIENT
Start: 2019-06-26 | End: 2019-06-26

## 2019-06-24 NOTE — TELEPHONE ENCOUNTER
2 SCRIPTS: norco & gabepentin      Patient informed of 48 hour refill policy excluding weekends and holidays. Informed patient only patient can  the prescription effective April 1, 2017.   Further explained patient will not receive a call back once p

## 2019-06-24 NOTE — TELEPHONE ENCOUNTER
2 Rx requests    Medication:     HYDROcodone-acetaminophen  MG Oral Tab    And    gabapentin 300 MG Oral Cap    Date of last refill:   HYDROcodone-acetaminophen  MG Oral Tab (6/4/19)    And    gabapentin 300 MG Oral Cap (5/28/19)    Date last f

## 2019-06-26 RX ORDER — HYDROCODONE BITARTRATE AND ACETAMINOPHEN 10; 325 MG/1; MG/1
1 TABLET ORAL EVERY 8 HOURS PRN
Qty: 90 TABLET | Refills: 0 | Status: SHIPPED | OUTPATIENT
Start: 2019-07-03 | End: 2019-07-19

## 2019-06-26 RX ORDER — GABAPENTIN 300 MG/1
CAPSULE ORAL
Qty: 90 CAPSULE | Refills: 0 | Status: SHIPPED | OUTPATIENT
Start: 2019-06-26 | End: 2019-07-26

## 2019-06-27 NOTE — TELEPHONE ENCOUNTER
Received a call from [employer] that patient's father called back. Advised PSR to inform below that Rx is ready for .

## 2019-07-19 DIAGNOSIS — M47.819 ARTHROPATHY OF FACET JOINT: ICD-10-CM

## 2019-07-19 DIAGNOSIS — M54.16 LUMBAR RADICULOPATHY: ICD-10-CM

## 2019-07-19 DIAGNOSIS — M43.07 LUMBOSACRAL SPONDYLOLYSIS: ICD-10-CM

## 2019-07-19 NOTE — TELEPHONE ENCOUNTER
Medication:   HYDROcodone-acetaminophen  MG Oral Tab    And    gabapentin 300 MG Oral Cap    Date of last refill:   HYDROcodone-acetaminophen  MG Oral Tab (7/3/19)    And    gabapentin 300 MG Oral Cap (6/26/19)      [place]

## 2019-07-24 NOTE — TELEPHONE ENCOUNTER
Spoke to [place], pt's daughter who states she always picks up early and doesn't understand why it's not signed. Explained that it is too early and the most we can do is 5 days prior to refill date, per DAJA Willams.  Therefore script will be ready for p/u on M

## 2019-07-26 RX ORDER — HYDROCODONE BITARTRATE AND ACETAMINOPHEN 10; 325 MG/1; MG/1
1 TABLET ORAL EVERY 8 HOURS PRN
Qty: 90 TABLET | Refills: 0 | Status: SHIPPED | OUTPATIENT
Start: 2019-08-01 | End: 2019-08-27

## 2019-07-26 RX ORDER — GABAPENTIN 300 MG/1
CAPSULE ORAL
Qty: 90 CAPSULE | Refills: 0 | Status: SHIPPED | OUTPATIENT
Start: 2019-07-26 | End: 2019-08-27

## 2019-07-31 NOTE — PROGRESS NOTES
Name: Surinder Cope   : 1935   DOS: 2019     Pain Clinic Follow Up Visit:   Surinder Cope is a 82year old female who presents for medication check.  Patient with history of chronic pain currently on norco 10/325mg q8h PRN with no noted Levothyroxine Sodium 125 MCG Oral Tab Take 125 mcg by mouth daily.  Disp:  Rfl:    Venlafaxine HCl  MG Oral Capsule SR 24 Hr  Disp:  Rfl:    Albuterol Sulfate HFA (PROAIR HFA) 108 (90 BASE) MCG/ACT Inhalation Aero Soln Inhale 1 puff into the lungs e consultations:None    The patient indicates understanding of these issues and agrees to the plan. Return in about 3 months (around 10/31/2019) for Continuation of medication.     SUZAN Skinner, 7/31/2019, 12:53 PM

## 2019-07-31 NOTE — PROGRESS NOTES
Patient presents in office today with reported pain in low back into left leg     Current pain level reported = 5/10    Last reported dose of hydrocodone today

## 2019-08-12 ENCOUNTER — TELEPHONE (OUTPATIENT)
Dept: PAIN CLINIC | Facility: CLINIC | Age: 84
End: 2019-08-12

## 2019-08-12 DIAGNOSIS — M47.819 FACET ARTHROPATHY: Primary | ICD-10-CM

## 2019-08-12 DIAGNOSIS — M47.819 ARTHROPATHY OF FACET JOINT: ICD-10-CM

## 2019-08-12 DIAGNOSIS — M54.16 LUMBAR RADICULOPATHY: ICD-10-CM

## 2019-08-12 NOTE — TELEPHONE ENCOUNTER
Received a fax from [facility] pharmacy requesting PA for Amitriptyline 25 mg     Referral placed for Amitriptyline 25 mg

## 2019-08-12 NOTE — TELEPHONE ENCOUNTER
Prior authorization request completed for: Amitriptyline 25mg   Authorization #[id number]  Authorization dates: 08/12/2019-12/31/2019  Authorization valid for: 30 tablets per 30 days

## 2019-08-12 NOTE — TELEPHONE ENCOUNTER
See referrals for additional info:     Prior authorization request for Amitriptyline 25mg submitted to [employer] via Cover My Meds, request pending, pending key: [id number]

## 2019-08-26 DIAGNOSIS — M54.16 LUMBAR RADICULOPATHY: ICD-10-CM

## 2019-08-26 DIAGNOSIS — M47.819 ARTHROPATHY OF FACET JOINT: ICD-10-CM

## 2019-08-26 DIAGNOSIS — M43.07 LUMBOSACRAL SPONDYLOLYSIS: ICD-10-CM

## 2019-08-26 NOTE — TELEPHONE ENCOUNTER
2 Refill Requests    Medication: HYDROcodone-acetaminophen  MG Oral Tab    Date of last refill: 8/5/19  [place]    Medication: gabapentin 300 MG Oral Cap    Date of last refill: 7/26/19  Date last filled per

## 2019-08-27 RX ORDER — GABAPENTIN 300 MG/1
CAPSULE ORAL
Qty: 90 CAPSULE | Refills: 0 | Status: SHIPPED | OUTPATIENT
Start: 2019-08-27 | End: 2019-11-04

## 2019-08-27 RX ORDER — HYDROCODONE BITARTRATE AND ACETAMINOPHEN 10; 325 MG/1; MG/1
1 TABLET ORAL EVERY 8 HOURS PRN
Qty: 90 TABLET | Refills: 0 | Status: SHIPPED | OUTPATIENT
Start: 2019-08-27 | End: 2019-09-27

## 2019-10-02 ENCOUNTER — APPOINTMENT (OUTPATIENT)
Dept: LAB | Age: 84
End: 2019-10-02
Attending: NURSE PRACTITIONER
Payer: MEDICARE

## 2019-10-02 DIAGNOSIS — F11.90 CHRONIC NARCOTIC USE: ICD-10-CM

## 2019-10-02 DIAGNOSIS — Z79.891 LONG TERM CURRENT USE OF OPIATE ANALGESIC: ICD-10-CM

## 2019-10-02 PROCEDURE — 80307 DRUG TEST PRSMV CHEM ANLYZR: CPT

## 2019-10-28 DIAGNOSIS — M43.07 LUMBOSACRAL SPONDYLOLYSIS: ICD-10-CM

## 2019-10-28 DIAGNOSIS — M54.16 LUMBAR RADICULOPATHY: ICD-10-CM

## 2019-10-28 DIAGNOSIS — M47.819 ARTHROPATHY OF FACET JOINT: ICD-10-CM

## 2019-10-28 RX ORDER — HYDROCODONE BITARTRATE AND ACETAMINOPHEN 10; 325 MG/1; MG/1
1 TABLET ORAL EVERY 8 HOURS PRN
Qty: 90 TABLET | Refills: 0 | Status: SHIPPED | OUTPATIENT
Start: 2019-11-02 | End: 2019-11-27

## 2019-10-28 NOTE — TELEPHONE ENCOUNTER
[place] refill   Patient informed of 48 hour refill policy excluding weekends and holidays. Informed patient only patient can  the prescription effective April 1, 2017.   Further explained patient will not receive a call back once prescription is ready

## 2019-10-30 ENCOUNTER — OFFICE VISIT (OUTPATIENT)
Dept: PAIN CLINIC | Facility: CLINIC | Age: 84
End: 2019-10-30
Payer: MEDICARE

## 2019-10-30 VITALS — HEIGHT: 62 IN | WEIGHT: 175 LBS | BODY MASS INDEX: 32.2 KG/M2 | HEART RATE: 92 BPM | OXYGEN SATURATION: 93 %

## 2019-10-30 DIAGNOSIS — M43.07 LUMBOSACRAL SPONDYLOLYSIS: ICD-10-CM

## 2019-10-30 DIAGNOSIS — M47.819 ARTHROPATHY OF FACET JOINT: ICD-10-CM

## 2019-10-30 DIAGNOSIS — M54.16 LUMBAR RADICULITIS: ICD-10-CM

## 2019-10-30 DIAGNOSIS — M47.816 FACET ARTHROPATHY, LUMBAR: ICD-10-CM

## 2019-10-30 DIAGNOSIS — Z79.891 LONG TERM (CURRENT) USE OF OPIATE ANALGESIC: Primary | ICD-10-CM

## 2019-10-30 DIAGNOSIS — M54.16 LUMBAR RADICULOPATHY: ICD-10-CM

## 2019-10-30 PROCEDURE — 99214 OFFICE O/P EST MOD 30 MIN: CPT | Performed by: NURSE PRACTITIONER

## 2019-10-30 RX ORDER — TIZANIDINE 2 MG/1
TABLET ORAL
Qty: 120 TABLET | Refills: 0 | Status: SHIPPED | OUTPATIENT
Start: 2019-10-30 | End: 2020-06-08

## 2019-10-30 RX ORDER — LUBIPROSTONE 24 UG/1
24 CAPSULE, GELATIN COATED ORAL 2 TIMES DAILY WITH MEALS
Qty: 60 CAPSULE | Refills: 0 | Status: SHIPPED | OUTPATIENT
Start: 2019-10-30 | End: 2019-11-29

## 2019-10-30 RX ORDER — AMITRIPTYLINE HYDROCHLORIDE 25 MG/1
25 TABLET, FILM COATED ORAL NIGHTLY
Qty: 30 TABLET | Refills: 0 | Status: SHIPPED | OUTPATIENT
Start: 2019-10-30 | End: 2021-05-03 | Stop reason: ALTCHOICE

## 2019-10-30 NOTE — PROGRESS NOTES
Patient presents in office today with reported pain in lumbar region, left leg, left groin    Current pain level reported = 6/10    Last reported dose of HYDROcodone-acetaminophen  MG Oral Tab = this morning    With poncho Costa to hear PHI.

## 2019-10-30 NOTE — PROGRESS NOTES
Name: Nando Hutchinson   : 1935   DOS: 10/30/2019     Pain Clinic Follow Up Visit:   Nando Hutchinson is a 82year old female who presents for medication recheck of her chronic low back pain, which is currently worse on the left lumbar area and r issues.     tiZANidine HCl 2 MG Oral Tab, Take one to two tablets every 8 hours if needed for muscle spasm., Disp: 120 tablet, Rfl: 0  Amitriptyline HCl 25 MG Oral Tab, Take 1 tablet (25 mg total) by mouth nightly., Disp: 30 tablet, Rfl: 0  lubiprostone 24 but also has a walker. Psychiatric: Appropriate mood. Back: Pain with flexion, extension, bilateral leaning and twisting motions. Pain to palpation over left lumbar facet joints and sacroiliac joints bilaterally. Straight leg raise positive on the left. ASSESSMENT AND PLAN:   Long term (current) use of opiate analgesic  (primary encounter diagnosis)  Lumbar radiculitis  Facet arthropathy, lumbar  Lumbosacral spondylolysis  Lumbar radiculopathy  Arthropathy of facet joint     I have recommended:  Osmel Stoddard indicates understanding of these issues and agrees to the plan. Return in about 1 week (around 11/6/2019). for MRI results. Follow up in 3 months for medication recheck.    SUZAN Dunne, 10/30/2019, 10:24 AM

## 2019-10-31 ENCOUNTER — TELEPHONE (OUTPATIENT)
Dept: PAIN CLINIC | Facility: CLINIC | Age: 84
End: 2019-10-31

## 2019-10-31 DIAGNOSIS — M47.819 ARTHROPATHY OF FACET JOINT: ICD-10-CM

## 2019-10-31 DIAGNOSIS — M43.07 LUMBOSACRAL SPONDYLOLYSIS: ICD-10-CM

## 2019-10-31 DIAGNOSIS — M54.16 LUMBAR RADICULOPATHY: Primary | ICD-10-CM

## 2019-10-31 NOTE — TELEPHONE ENCOUNTER
Received Fax from [facility]     Coverage determination is not required for coverage of Tizanidine HCI 2 MG Tablet with a quantity of 120 per 30 days under your plan.      This Medication does not require coverage determination for coverage under y

## 2019-10-31 NOTE — TELEPHONE ENCOUNTER
Spoke with Kevan Walker at [facility] [phone]  Time:21:05    Prior Authorization submitted for tiZANidine HCl 2 MG Oral Tab, Determination will be faxed to Office. Confirmed Fax #.

## 2019-10-31 NOTE — TELEPHONE ENCOUNTER
Received a Prior authorization request via fax for Tizanidine 2 mg tabs from [facility] pharmacy.      Referral placed for Tizanidine 2 mg tabs

## 2019-11-04 DIAGNOSIS — M54.16 LUMBAR RADICULOPATHY: ICD-10-CM

## 2019-11-04 DIAGNOSIS — M43.07 LUMBOSACRAL SPONDYLOLYSIS: ICD-10-CM

## 2019-11-04 DIAGNOSIS — M47.819 ARTHROPATHY OF FACET JOINT: ICD-10-CM

## 2019-11-05 RX ORDER — GABAPENTIN 300 MG/1
CAPSULE ORAL
Qty: 90 CAPSULE | Refills: 0 | Status: SHIPPED | OUTPATIENT
Start: 2019-11-05 | End: 2019-11-29

## 2019-11-05 NOTE — TELEPHONE ENCOUNTER
Medication: gabapentin 300 MG Oral Cap    Date of last refill: 8/27/19  [place]    Last office visit: 10/30/19  Due back to clinic per last office note: Return in about 1 week (around 11/6/2019). for MRI results. consultations:MRI SPINE LUMBAR (CPT=72148)  For further evaluation. We will contact patient with MRI results and additional treatment recommendations once it has been reviewed.     I have recommended that she continue to follow with her PCP and discuss her

## 2019-11-27 ENCOUNTER — TELEPHONE (OUTPATIENT)
Dept: PAIN CLINIC | Facility: CLINIC | Age: 84
End: 2019-11-27

## 2019-11-27 DIAGNOSIS — M43.07 LUMBOSACRAL SPONDYLOLYSIS: ICD-10-CM

## 2019-11-27 DIAGNOSIS — M54.16 LUMBAR RADICULOPATHY: ICD-10-CM

## 2019-11-27 DIAGNOSIS — M47.819 ARTHROPATHY OF FACET JOINT: ICD-10-CM

## 2019-11-27 NOTE — TELEPHONE ENCOUNTER
2 SCRIPTS norco & gabapentin    Patient informed of 48 hour refill policy excluding weekends and holidays. Informed patient only patient can  the prescription effective April 1, 2017.   Further explained patient will not receive a call back once pres

## 2019-11-27 NOTE — TELEPHONE ENCOUNTER
2 Rx requests    Medication:     HYDROcodone-acetaminophen  MG Oral Tab    and    gabapentin 300 MG Oral Cap    Date of last refill: HYDROcodone-acetaminophen  MG Oral Tab (11/2/19)    And    gabapentin 300 MG Oral Cap (11/5/19)    Date last fi daily with meals.      Risks and benefits of the medications were discussed with patient today.  Patient should contact our office if this medication is not sufficient to help manage pain.      Radiology orders and consultations:MRI SPINE LUMBAR (CPT=72148)

## 2019-11-29 RX ORDER — HYDROCODONE BITARTRATE AND ACETAMINOPHEN 10; 325 MG/1; MG/1
1 TABLET ORAL EVERY 8 HOURS PRN
Qty: 90 TABLET | Refills: 0 | Status: SHIPPED | OUTPATIENT
Start: 2019-12-01 | End: 2019-12-27

## 2019-11-29 RX ORDER — GABAPENTIN 300 MG/1
CAPSULE ORAL
Qty: 90 CAPSULE | Refills: 0 | Status: SHIPPED | OUTPATIENT
Start: 2019-11-29 | End: 2019-12-27

## 2019-11-29 NOTE — TELEPHONE ENCOUNTER
Norco ready for [place] [address]      The following documentation was provided to patients at time of Rx pickup:    From the Office of Shaggy Foreman and Kalli Bell at [facility]:  [facility] office is committed to providing the best care to our pa

## 2019-12-27 DIAGNOSIS — M54.16 LUMBAR RADICULOPATHY: ICD-10-CM

## 2019-12-27 DIAGNOSIS — M47.819 ARTHROPATHY OF FACET JOINT: ICD-10-CM

## 2019-12-27 DIAGNOSIS — M43.07 LUMBOSACRAL SPONDYLOLYSIS: ICD-10-CM

## 2019-12-27 RX ORDER — HYDROCODONE BITARTRATE AND ACETAMINOPHEN 10; 325 MG/1; MG/1
1 TABLET ORAL EVERY 8 HOURS PRN
Qty: 90 TABLET | Refills: 0 | Status: SHIPPED | OUTPATIENT
Start: 2019-12-31 | End: 2020-01-29

## 2019-12-27 RX ORDER — GABAPENTIN 300 MG/1
CAPSULE ORAL
Qty: 90 CAPSULE | Refills: 0 | Status: SHIPPED | OUTPATIENT
Start: 2019-12-27 | End: 2020-01-28

## 2019-12-27 NOTE — TELEPHONE ENCOUNTER
Medication(s):      HYDROcodone-acetaminophen  MG Oral Tab  Date of last refill: 12/01/19  [place] 63/84/88    And    gabapentin 300 MG Oral Cap  Date of last refill: 11/29/19  [place] discussed with patient today. Patient should contact our office if this medication is not sufficient to help manage pain.      Radiology orders and consultations:MRI SPINE LUMBAR (CPT=72148)  For further evaluation.  We will contact patient with MRI results

## 2019-12-27 NOTE — TELEPHONE ENCOUNTER
2 SCRIPTS: norco, gabapentin      Patient informed of 48 hour refill policy excluding weekends and holidays. Informed patient only patient can  the prescription effective April 1, 2017.   Further explained patient will not receive a call back once pr

## 2020-01-16 ENCOUNTER — TELEPHONE (OUTPATIENT)
Dept: SURGERY | Facility: CLINIC | Age: 85
End: 2020-01-16

## 2020-01-20 NOTE — TELEPHONE ENCOUNTER
pt's daughter states that insurance changed. Asked what insurance is, she stated that it was [facility] plus Medicare HMO through [facility]. Told her we may be out of network. She said she'll bring me the card tomorrow so we can find out for sure.

## 2020-01-21 NOTE — TELEPHONE ENCOUNTER
pt's daughter brought in card, scanned into documents-OUT OF NETWORK. She said she will talk to her sister about this & see if it's something they can try to change. She hasn't told the patient yet.

## 2020-01-27 DIAGNOSIS — M43.07 LUMBOSACRAL SPONDYLOLYSIS: ICD-10-CM

## 2020-01-27 DIAGNOSIS — M47.819 ARTHROPATHY OF FACET JOINT: ICD-10-CM

## 2020-01-27 DIAGNOSIS — M54.16 LUMBAR RADICULOPATHY: ICD-10-CM

## 2020-01-28 RX ORDER — GABAPENTIN 300 MG/1
CAPSULE ORAL
Qty: 90 CAPSULE | Refills: 0 | Status: ON HOLD | OUTPATIENT
Start: 2020-01-28 | End: 2020-03-03

## 2020-01-28 NOTE — PROGRESS NOTES
Name: Gin Sousa   : 1935   DOS: 2020     Pain Clinic Follow Up Visit:   Gin Sousa is a 82year old female who presents for recheck of her chronic low back pain.     Rates pain today:     Status post:    Review of systems:  Eleazar distress. Neurologic: Alert, oriented, articulate. Normal gait. Psychiatric: Appropriate mood. Neck: Pain with flexion, extension, and lateral rotation bilaterally. Pain to palpation over bilateral cervical facet joints.  Tightness to trapezius muscles

## 2020-01-28 NOTE — TELEPHONE ENCOUNTER
Medication: gabapentin 300 MG Oral Cap    Date of last refill: 12/27/19  [place]    Last office visit: 10/30/19  Due back to clinic per last office note: Return in about 1 week (around 11/6/2019). for MRI results. consultations:MRI SPINE LUMBAR (CPT=72148)  For further evaluation. We will contact patient with MRI results and additional treatment recommendations once it has been reviewed.     I have recommended that she continue to follow with her PCP and discuss her

## 2020-01-29 ENCOUNTER — OFFICE VISIT (OUTPATIENT)
Dept: PAIN CLINIC | Facility: CLINIC | Age: 85
End: 2020-01-29

## 2020-01-29 ENCOUNTER — TELEPHONE (OUTPATIENT)
Dept: PAIN CLINIC | Facility: CLINIC | Age: 85
End: 2020-01-29

## 2020-01-29 DIAGNOSIS — Z79.891 LONG TERM (CURRENT) USE OF OPIATE ANALGESIC: Primary | ICD-10-CM

## 2020-01-29 DIAGNOSIS — M54.16 LUMBAR RADICULOPATHY: ICD-10-CM

## 2020-01-29 DIAGNOSIS — M43.07 LUMBOSACRAL SPONDYLOLYSIS: ICD-10-CM

## 2020-01-29 DIAGNOSIS — M47.819 ARTHROPATHY OF FACET JOINT: ICD-10-CM

## 2020-01-29 RX ORDER — LEVOTHYROXINE SODIUM 0.12 MG/1
TABLET ORAL
COMMUNITY
Start: 2019-12-10 | End: 2020-02-03

## 2020-01-29 RX ORDER — HYDROCODONE BITARTRATE AND ACETAMINOPHEN 10; 325 MG/1; MG/1
1 TABLET ORAL EVERY 8 HOURS PRN
Qty: 90 TABLET | Refills: 0 | Status: SHIPPED | OUTPATIENT
Start: 2020-01-29 | End: 2020-02-27

## 2020-01-29 RX ORDER — VALSARTAN 80 MG/1
TABLET ORAL
COMMUNITY
Start: 2019-12-10 | End: 2020-02-03

## 2020-01-29 RX ORDER — SIMVASTATIN 10 MG
TABLET ORAL
COMMUNITY
Start: 2019-12-10 | End: 2020-02-03

## 2020-01-29 RX ORDER — PERMETHRIN 50 MG/G
CREAM TOPICAL
COMMUNITY
Start: 2019-11-07 | End: 2020-02-06 | Stop reason: ALTCHOICE

## 2020-01-29 RX ORDER — MONTELUKAST SODIUM 10 MG/1
TABLET ORAL
COMMUNITY
Start: 2019-12-10 | End: 2020-02-03

## 2020-01-29 NOTE — TELEPHONE ENCOUNTER
We decided to cancel patient's visit today due to insurance issues. She signed up for a plan that is out of network with out office and would have to be self pay today.  Patient's daughter states that they are switching over to an [address] and are waitin

## 2020-02-03 ENCOUNTER — OFFICE VISIT (OUTPATIENT)
Dept: INTERNAL MEDICINE CLINIC | Facility: CLINIC | Age: 85
End: 2020-02-03
Payer: MEDICARE

## 2020-02-03 VITALS
OXYGEN SATURATION: 96 % | RESPIRATION RATE: 14 BRPM | DIASTOLIC BLOOD PRESSURE: 70 MMHG | WEIGHT: 168 LBS | HEIGHT: 62 IN | BODY MASS INDEX: 30.91 KG/M2 | SYSTOLIC BLOOD PRESSURE: 142 MMHG | HEART RATE: 84 BPM | TEMPERATURE: 100 F

## 2020-02-03 DIAGNOSIS — F32.1 MAJOR DEPRESSIVE DISORDER, SINGLE EPISODE, MODERATE (HCC): ICD-10-CM

## 2020-02-03 DIAGNOSIS — M46.1 SACROILIITIS (HCC): ICD-10-CM

## 2020-02-03 DIAGNOSIS — E11.9 TYPE 2 DIABETES MELLITUS WITHOUT COMPLICATION, WITHOUT LONG-TERM CURRENT USE OF INSULIN (HCC): ICD-10-CM

## 2020-02-03 DIAGNOSIS — E03.9 ACQUIRED HYPOTHYROIDISM: ICD-10-CM

## 2020-02-03 DIAGNOSIS — L29.9 PRURITUS: ICD-10-CM

## 2020-02-03 DIAGNOSIS — J45.30 MILD PERSISTENT ASTHMA WITHOUT COMPLICATION: ICD-10-CM

## 2020-02-03 DIAGNOSIS — M47.816 ARTHRITIS OF FACET JOINT OF LUMBAR SPINE: ICD-10-CM

## 2020-02-03 DIAGNOSIS — L91.8 INFLAMED ACROCHORDON: Primary | ICD-10-CM

## 2020-02-03 DIAGNOSIS — I48.0 PAROXYSMAL ATRIAL FIBRILLATION (HCC): ICD-10-CM

## 2020-02-03 DIAGNOSIS — E78.5 DYSLIPIDEMIA: ICD-10-CM

## 2020-02-03 DIAGNOSIS — I10 ESSENTIAL HYPERTENSION: ICD-10-CM

## 2020-02-03 PROCEDURE — 99204 OFFICE O/P NEW MOD 45 MIN: CPT | Performed by: PHYSICIAN ASSISTANT

## 2020-02-03 RX ORDER — PERMETHRIN 50 MG/G
CREAM TOPICAL
Qty: 60 G | Refills: 0 | Status: SHIPPED | OUTPATIENT
Start: 2020-02-03 | End: 2020-06-23 | Stop reason: ALTCHOICE

## 2020-02-03 NOTE — PROGRESS NOTES
Surinder Cope is a 82year old female. HPI:   Patient presents for recheck of her hypertension. Pt has been taking medications as instructed, no medication side effects, home BP monitoring in the range of 129'E systolic and 57'Y diastolic.     C/o i Oral Tab Take 80 mg by mouth. • [employer] [facility] LOCKING BRACKET) Does not apply Misc by Misc. (Non-Drug; Combo Route) route as directed. Thigh high KIANA hose or similar for varicose veins.   Bilateral.     • omeprazole 20 MG Oral Capsule Alvina Morrison MD at [address] [place] [address] Right 9/18/2014    Performed by Aida Moore MD at [facility] MAIN OR   • [address] [address] Left 9/2/2014    Performed by Francesca Guerrero edema  Bilateral barefoot skin diabetic exam is normal, visualized feet and the appearance is normal.  Bilateral monofilament/sensation of both feet is normal.  Pulsation pedal pulse exam of both lower legs/feet is normal as well.      Labs per [facility] Everywh

## 2020-02-03 NOTE — PATIENT INSTRUCTIONS
Continue current medications   Before next visit: Have labs drawn, fasting 8-10 hours prior (water before is ok). Apply lotion as directed:  Thoroughly massage cream (30 g for average adult) from head to soles of feet; leave on for 8 to 14 hours before r

## 2020-02-05 PROBLEM — R47.9 TRANSIENT SPEECH DISTURBANCE: Status: RESOLVED | Noted: 2017-10-17 | Resolved: 2020-02-05

## 2020-02-05 PROBLEM — M25.579 JOINT PAIN OF ANKLE AND FOOT, UNSPECIFIED LATERALITY: Status: RESOLVED | Noted: 2018-06-08 | Resolved: 2020-02-05

## 2020-02-05 PROBLEM — M79.672 BILATERAL FOOT PAIN: Status: RESOLVED | Noted: 2018-09-01 | Resolved: 2020-02-05

## 2020-02-05 PROBLEM — R29.90 STROKE-LIKE SYMPTOM: Status: RESOLVED | Noted: 2017-10-17 | Resolved: 2020-02-05

## 2020-02-05 PROBLEM — M25.552 BILATERAL HIP PAIN: Status: RESOLVED | Noted: 2018-09-01 | Resolved: 2020-02-05

## 2020-02-05 PROBLEM — M25.551 BILATERAL HIP PAIN: Status: RESOLVED | Noted: 2018-09-01 | Resolved: 2020-02-05

## 2020-02-05 PROBLEM — M79.671 BILATERAL FOOT PAIN: Status: RESOLVED | Noted: 2018-09-01 | Resolved: 2020-02-05

## 2020-02-06 PROBLEM — M47.816 LUMBAR SPONDYLOSIS: Status: ACTIVE | Noted: 2020-02-06

## 2020-02-11 NOTE — PROGRESS NOTES
Name: Adal Bhatti   : 1935   DOS: 2020     Pain Clinic Follow Up Visit:   Adal Bhatti is a 82year old female who presents for recheck of her chronic low back pain.     Rates pain today:     Status post:    Review of systems:  Eleazar flexion, extension, and lateral rotation bilaterally. Pain to palpation over bilateral cervical facet joints. Tightness to trapezius muscles bilaterally. Radial pulsation is palpable bilaterally. Halsted maneuver is normal. Spurling sign is negative.   Back

## 2020-02-12 ENCOUNTER — TELEPHONE (OUTPATIENT)
Dept: PAIN CLINIC | Facility: CLINIC | Age: 85
End: 2020-02-12

## 2020-02-12 ENCOUNTER — OFFICE VISIT (OUTPATIENT)
Dept: PAIN CLINIC | Facility: CLINIC | Age: 85
End: 2020-02-12
Payer: MEDICARE

## 2020-02-12 VITALS
SYSTOLIC BLOOD PRESSURE: 120 MMHG | HEIGHT: 62 IN | DIASTOLIC BLOOD PRESSURE: 60 MMHG | BODY MASS INDEX: 30 KG/M2 | WEIGHT: 163 LBS | OXYGEN SATURATION: 95 % | HEART RATE: 73 BPM

## 2020-02-12 DIAGNOSIS — M47.816 LUMBAR FACET ARTHROPATHY: Primary | ICD-10-CM

## 2020-02-12 DIAGNOSIS — M54.16 LUMBAR RADICULITIS: Primary | ICD-10-CM

## 2020-02-12 PROCEDURE — 99212 OFFICE O/P EST SF 10 MIN: CPT | Performed by: ANESTHESIOLOGY

## 2020-02-12 NOTE — TELEPHONE ENCOUNTER
Prior authorization request completed for: Bilateral SIJI  Authorization #[id number]  Spoke with Pawan Pavon at [place] [address] Team [phone]  Time:10:33  Confirmed Authorization     Authorization dates: 02/12/20 - 03/27/20  CPT codes approved: 65204

## 2020-02-12 NOTE — PROGRESS NOTES
Name: Jenny Fairchild   : 1935   DOS: 2020    Pain Clinic Follow Up Visit:   Jenny Fairchild is a 82year old female who presents for recheck of her chronic low back pain.  She is status post transforaminal lumbar epidural steroid and diagn • aspirin EC 81 MG Oral Tab EC Take 1 tablet (81 mg total) by mouth daily. 90 tablet 3   • simvastatin 10 MG Oral Tab Take 1 tablet (10 mg total) by mouth nightly. 90 tablet 3   • Levothyroxine Sodium 125 MCG Oral Tab Take 125 mcg by mouth daily.      • V plan.  No follow-ups on file.     Can Morel, 2/12/20, 1:12 PM

## 2020-02-12 NOTE — TELEPHONE ENCOUNTER
Medical clearance needed- No    Implanted cardiac device/SCS/PNS: NA    Pt seen in OV today by Dr. Jose Enrique Beavers and recommended for:   #1. SIJI (X 1)  Laterality: Bilateral  Level(s): NA    #2.  Facet (X 1)  Laterality: Bilateral  Level(s): L4-S1   Please begin PA

## 2020-02-12 NOTE — PROGRESS NOTES
Patient presents in office today with reported pain in the whole back     Current pain level reported = 6/10    Last reported dose of HYDROcodone-acetaminophen  MG Oral Tab today in the AM

## 2020-02-12 NOTE — PROGRESS NOTES
*Per patient, call daughter Glynn Brought to schedule injections [phone]    audelia with patient and discussed scheduling process. Reviewed pre-op instructions.  Patient verbalized understanding, and agreeable to holding ASA 81mg and Aleve for 24 hours prior to ? A family member or friend is required to stay in our waiting room because of the sedation you will receive, you may be sleepy and forgetful. You may not remember anything told to you after your procedure including discharge instructions.   ? Please park i Ibuprofen (Motrin, Advil, Vicoprofen), Naproxen (Naprosyn, Aleve), Piroxcam (Feldene), Meloxicam (Mobic)--(Cervical procedures will require 3 days), Oxaprozin (Daypro), Diclofenac (Voltaren), Indomethacin (Indocin), Etodolac (Lodine), Nabumetone (Relafen

## 2020-02-14 ENCOUNTER — TELEPHONE (OUTPATIENT)
Dept: PAIN CLINIC | Facility: CLINIC | Age: 85
End: 2020-02-14

## 2020-02-14 DIAGNOSIS — M46.1 SACROILIITIS (HCC): Primary | ICD-10-CM

## 2020-02-14 DIAGNOSIS — M47.817 LUMBOSACRAL SPONDYLOSIS WITHOUT MYELOPATHY: ICD-10-CM

## 2020-02-14 NOTE — TELEPHONE ENCOUNTER
Spoke with patients daughter, Glynn Cook, and was advised of insurance approval to proceed with injections  and is agreeable to scheduling. Patient scheduled for procedure (Feb 25th case time 10:30am) pre-procedure instructions reviewed.  Patient prefers conscio

## 2020-02-14 NOTE — TELEPHONE ENCOUNTER
Completed OrthoNet Form for Bilateral Lumbar FACET ([id number]) attached clinical information faxed to [phone];confirmation received.

## 2020-02-17 NOTE — TELEPHONE ENCOUNTER
Prior authorization request completed for: Bilateral Lumbar FACET   Authorization #:[id number]    Authorization dates: 02/14/20 - 03/30/20  CPT codes approved: 77932,30126  Number of visits/dates of service approved: 1  Physician: Daiana Mckeon     Patient ca

## 2020-02-17 NOTE — TELEPHONE ENCOUNTER
Spoke to Girdler to schedule pt for bilat facets for 3/3/2020, case time 1030. Reviewed pre-procedure instructions for sedation including ASA 81 hold, NPO status, check in location, and changes in health status.  Justine verbalized understanding w/ no further n

## 2020-02-17 NOTE — TELEPHONE ENCOUNTER
LM for Ramin Sahu, pt's daughter, to call back. Pt instructed office to contact daughter to schedule injections.

## 2020-02-19 ENCOUNTER — TELEPHONE (OUTPATIENT)
Dept: PAIN CLINIC | Facility: CLINIC | Age: 85
End: 2020-02-19

## 2020-02-19 NOTE — TELEPHONE ENCOUNTER
Spoke with patient, confirmed procedure date of 2/25/20 and to be checked in at outpatient registration at 0930 am with patient's . Encouraged patient to listen to the pre-procedure line at [phone].  Patient instructed to call office if there are

## 2020-02-21 ENCOUNTER — HOSPITAL ENCOUNTER (OUTPATIENT)
Dept: MRI IMAGING | Age: 85
Discharge: HOME | End: 2020-02-21
Attending: ANESTHESIOLOGY
Payer: MEDICARE

## 2020-02-21 DIAGNOSIS — M54.16 LUMBAR RADICULITIS: ICD-10-CM

## 2020-02-21 PROCEDURE — 72148 MRI LUMBAR SPINE W/O DYE: CPT | Performed by: ANESTHESIOLOGY

## 2020-02-25 NOTE — TELEPHONE ENCOUNTER
pt's daughter called stating pt has a cold and would like to CANCEL the injection for 02/25; daughter will call back to reschedule once pt feeling better

## 2020-02-25 NOTE — TELEPHONE ENCOUNTER
2/25 procedure removed from OR schedule, placed in depot for future rescheduling. Ej Mei, in [place] notified of schedule change.

## 2020-02-27 DIAGNOSIS — M43.07 LUMBOSACRAL SPONDYLOLYSIS: ICD-10-CM

## 2020-02-27 DIAGNOSIS — M47.819 ARTHROPATHY OF FACET JOINT: ICD-10-CM

## 2020-02-27 DIAGNOSIS — M54.16 LUMBAR RADICULOPATHY: ICD-10-CM

## 2020-02-27 RX ORDER — HYDROCODONE BITARTRATE AND ACETAMINOPHEN 10; 325 MG/1; MG/1
1 TABLET ORAL EVERY 8 HOURS PRN
Qty: 90 TABLET | Refills: 0 | Status: SHIPPED | OUTPATIENT
Start: 2020-03-02 | End: 2020-03-16

## 2020-02-27 NOTE — TELEPHONE ENCOUNTER
Medication: HYDROcodone-acetaminophen  MG Oral Tab    Date of last refill: 01/29/20  [place] 78/16/88    Last office visit: 02/12/20  Due back to clinic per last office note: Not indicated   Date next office visit

## 2020-02-27 NOTE — TELEPHONE ENCOUNTER
[place] refill  Patient informed of 48 hour refill policy excluding weekends and holidays. Informed patient only patient can  the prescription effective April 1, 2017. Further explained patient will not receive a call back once prescription is ready.

## 2020-03-03 ENCOUNTER — APPOINTMENT (OUTPATIENT)
Dept: GENERAL RADIOLOGY | Facility: HOSPITAL | Age: 85
End: 2020-03-03
Attending: ANESTHESIOLOGY
Payer: MEDICARE

## 2020-03-03 ENCOUNTER — HOSPITAL ENCOUNTER (OUTPATIENT)
Facility: HOSPITAL | Age: 85
Setting detail: HOSPITAL OUTPATIENT SURGERY
Discharge: HOME | End: 2020-03-03
Attending: ANESTHESIOLOGY | Admitting: ANESTHESIOLOGY
Payer: MEDICARE

## 2020-03-03 VITALS
RESPIRATION RATE: 16 BRPM | HEART RATE: 55 BPM | DIASTOLIC BLOOD PRESSURE: 55 MMHG | OXYGEN SATURATION: 93 % | SYSTOLIC BLOOD PRESSURE: 154 MMHG | TEMPERATURE: 97 F

## 2020-03-03 DIAGNOSIS — M47.816 LUMBAR FACET ARTHROPATHY: ICD-10-CM

## 2020-03-03 LAB — GLUCOSE BLD-MCNC: 102 MG/DL (ref 70–99)

## 2020-03-03 PROCEDURE — 99152 MOD SED SAME PHYS/QHP 5/>YRS: CPT | Performed by: ANESTHESIOLOGY

## 2020-03-03 PROCEDURE — 82962 GLUCOSE BLOOD TEST: CPT

## 2020-03-03 PROCEDURE — 3E0U33Z INTRODUCTION OF ANTI-INFLAMMATORY INTO JOINTS, PERCUTANEOUS APPROACH: ICD-10-PCS | Performed by: ANESTHESIOLOGY

## 2020-03-03 PROCEDURE — 3E0U3BZ INTRODUCTION OF ANESTHETIC AGENT INTO JOINTS, PERCUTANEOUS APPROACH: ICD-10-PCS | Performed by: ANESTHESIOLOGY

## 2020-03-03 RX ORDER — LIDOCAINE HYDROCHLORIDE 10 MG/ML
INJECTION, SOLUTION EPIDURAL; INFILTRATION; INTRACAUDAL; PERINEURAL AS NEEDED
Status: DISCONTINUED | OUTPATIENT
Start: 2020-03-03 | End: 2020-03-03

## 2020-03-03 RX ORDER — ONDANSETRON 2 MG/ML
4 INJECTION INTRAMUSCULAR; INTRAVENOUS ONCE AS NEEDED
Status: DISCONTINUED | OUTPATIENT
Start: 2020-03-03 | End: 2020-03-03

## 2020-03-03 RX ORDER — GABAPENTIN 300 MG/1
CAPSULE ORAL
Qty: 90 CAPSULE | Refills: 0 | Status: SHIPPED | OUTPATIENT
Start: 2020-03-03 | End: 2020-04-23

## 2020-03-03 RX ORDER — SODIUM CHLORIDE, SODIUM LACTATE, POTASSIUM CHLORIDE, CALCIUM CHLORIDE 600; 310; 30; 20 MG/100ML; MG/100ML; MG/100ML; MG/100ML
100 INJECTION, SOLUTION INTRAVENOUS CONTINUOUS
Status: DISCONTINUED | OUTPATIENT
Start: 2020-03-03 | End: 2020-03-03

## 2020-03-03 RX ORDER — MIDAZOLAM HYDROCHLORIDE 1 MG/ML
INJECTION INTRAMUSCULAR; INTRAVENOUS AS NEEDED
Status: DISCONTINUED | OUTPATIENT
Start: 2020-03-03 | End: 2020-03-03

## 2020-03-03 RX ORDER — METHYLPREDNISOLONE ACETATE 40 MG/ML
INJECTION, SUSPENSION INTRA-ARTICULAR; INTRALESIONAL; INTRAMUSCULAR; SOFT TISSUE AS NEEDED
Status: DISCONTINUED | OUTPATIENT
Start: 2020-03-03 | End: 2020-03-03

## 2020-03-03 RX ORDER — INSULIN ASPART 100 [IU]/ML
3 INJECTION, SOLUTION INTRAVENOUS; SUBCUTANEOUS ONCE
Status: DISCONTINUED | OUTPATIENT
Start: 2020-03-03 | End: 2020-03-03

## 2020-03-03 RX ORDER — DEXTROSE MONOHYDRATE 25 G/50ML
50 INJECTION, SOLUTION INTRAVENOUS
Status: DISCONTINUED | OUTPATIENT
Start: 2020-03-03 | End: 2020-03-03

## 2020-03-03 RX ORDER — DIPHENHYDRAMINE HYDROCHLORIDE 50 MG/ML
50 INJECTION INTRAMUSCULAR; INTRAVENOUS ONCE AS NEEDED
Status: DISCONTINUED | OUTPATIENT
Start: 2020-03-03 | End: 2020-03-03

## 2020-03-03 NOTE — H&P
History & Physical Examination    Patient Name: Justin Mclaughlin  MRN: [id number]  CSN: [id number]  YOB: 1935    Pre-Operative Diagnosis:  Lumbar facet arthropathy [M47.816]    Present Illness: A 82year old female with low back pain is here Or  Glucose-Vitamin C (DEX-4) chewable tab 4 tablet, 4 tablet, Oral, Q15 Min PRN    Or  dextrose 50 % injection 50 mL, 50 mL, Intravenous, Q15 Min PRN    Or  glucose (DEX4) oral liquid 30 g, 30 g, Oral, Q15 Min PRN    Or  Glucose-Vitamin C (DEX-4) chewable Francesco Meredith MD at [address] [place] [address] Right 9/18/2014    Performed by Francesco Meredith MD at [facility] MAIN OR   • [address] [address] Left 9/2/2014    Performed

## 2020-03-03 NOTE — OPERATIVE REPORT
[facility]  Operative Report  3/3/2020     Deb Corley Patient Status:  Hospital Outpatient Surgery    1935 MRN [id number]   [facility] ENDOSCOPY Attending No att. providers found   Hosp Day # 0 PCP MD Dallas Toussaint introduced and advanced into the left L3-L4, L4-L5 and L5-S1 levels atraumatically under fluoroscopic guidance.   Following negative aspiration for CSF and blood, approximately 1 mL of 1% lidocaine with 10 mg of methylprednisolone was injected into each erin

## 2020-03-13 NOTE — TELEPHONE ENCOUNTER
Spoke with patient's daughter who stated her mom is in a lot of pain since procedure on 3/3/20 and wondering if she should schedule the next injection . Advised patient's daughter patient will need to follow up with Dr Nusrat Low in office if pain has worsened.

## 2020-03-16 ENCOUNTER — OFFICE VISIT (OUTPATIENT)
Dept: PAIN CLINIC | Facility: CLINIC | Age: 85
End: 2020-03-16
Payer: MEDICARE

## 2020-03-16 ENCOUNTER — TELEPHONE (OUTPATIENT)
Dept: PAIN CLINIC | Facility: CLINIC | Age: 85
End: 2020-03-16

## 2020-03-16 VITALS
HEART RATE: 69 BPM | WEIGHT: 165 LBS | DIASTOLIC BLOOD PRESSURE: 83 MMHG | SYSTOLIC BLOOD PRESSURE: 146 MMHG | BODY MASS INDEX: 30.36 KG/M2 | OXYGEN SATURATION: 95 % | HEIGHT: 62 IN

## 2020-03-16 DIAGNOSIS — M43.07 LUMBOSACRAL SPONDYLOLYSIS: ICD-10-CM

## 2020-03-16 DIAGNOSIS — M47.819 ARTHROPATHY OF FACET JOINT: ICD-10-CM

## 2020-03-16 DIAGNOSIS — M54.16 LUMBAR RADICULOPATHY: ICD-10-CM

## 2020-03-16 PROCEDURE — 99214 OFFICE O/P EST MOD 30 MIN: CPT | Performed by: ANESTHESIOLOGY

## 2020-03-16 RX ORDER — HYDROCODONE BITARTRATE AND ACETAMINOPHEN 10; 325 MG/1; MG/1
1 TABLET ORAL EVERY 8 HOURS PRN
Qty: 90 TABLET | Refills: 0 | Status: SHIPPED | OUTPATIENT
Start: 2020-03-16 | End: 2020-04-23

## 2020-03-16 NOTE — PROGRESS NOTES
Prior authorization required for recommended procedures. Patient left without receiving pre-procedure instructions.

## 2020-03-16 NOTE — PROGRESS NOTES
Patient presents in office today with reported pain in left low back and bilateral legs. Pt reports both feet are painful, left is constant and the right is intermittent.     Current pain level reported = 6/10    Last reported dose of HYDROcodone-acetaminop

## 2020-03-16 NOTE — TELEPHONE ENCOUNTER
Medical clearance needed- No    Implanted cardiac device/SCS/PNS: NA    Pt seen in OV today by Dr. Christian Candelaria and recommended for LESI (X 3). Please begin PA process for procedure(s).      Laterality: NA  Level(s): L2-3    Pt informed callback will be given when

## 2020-03-22 NOTE — PROGRESS NOTES
Name: Nando Hutchinson   : 1935   DOS: 3/16/2020    Pain Clinic Follow Up Visit:   Nando Hutchinson is a 82year old female who presents for recheck of her chronic low back pain.  She is status post bilateral diagnostic lumbar facet joint injectio • aspirin EC 81 MG Oral Tab EC Take 1 tablet (81 mg total) by mouth daily. 90 tablet 3   • simvastatin 10 MG Oral Tab Take 1 tablet (10 mg total) by mouth nightly. 90 tablet 3   • Levothyroxine Sodium 125 MCG Oral Tab Take 125 mcg by mouth daily.      • V as needed for Pain. Radiology orders and consultations:None    The patient indicates understanding of these issues and agrees to the plan. No follow-ups on file.     Aliyah Douglas, 3/16/20, 1:12 PM

## 2020-03-23 NOTE — TELEPHONE ENCOUNTER
Completed OrthoNet Form for 1st LESI (58936) attached clinical information faxed to [facility].

## 2020-03-23 NOTE — TELEPHONE ENCOUNTER
Prior authorization request completed for: 1st MENSAH   Authorization #[id number]    Authorization dates: 03/23/20 - 06/21/20  CPT codes approved: 93500  Number of visits/dates of service approved: 1  Physician: Eriberto Roman     Patient can be scheduled

## 2020-03-26 NOTE — TELEPHONE ENCOUNTER
Spoke with patient and advised on insurance approval to proceed with the injection however at this time due to the coronavirus, scheduling is being postponed. Patient advised once scheduling may resume she will be contacted to do so.  Provided office phone

## 2020-04-23 ENCOUNTER — TELEPHONE (OUTPATIENT)
Dept: SURGERY | Facility: CLINIC | Age: 85
End: 2020-04-23

## 2020-04-23 DIAGNOSIS — M43.07 LUMBOSACRAL SPONDYLOLYSIS: ICD-10-CM

## 2020-04-23 DIAGNOSIS — M47.819 ARTHROPATHY OF FACET JOINT: ICD-10-CM

## 2020-04-23 DIAGNOSIS — M54.16 LUMBAR RADICULOPATHY: ICD-10-CM

## 2020-04-23 RX ORDER — HYDROCODONE BITARTRATE AND ACETAMINOPHEN 10; 325 MG/1; MG/1
1 TABLET ORAL EVERY 8 HOURS PRN
Qty: 90 TABLET | Refills: 0 | Status: SHIPPED | OUTPATIENT
Start: 2020-05-02 | End: 2020-05-22

## 2020-04-23 RX ORDER — GABAPENTIN 300 MG/1
CAPSULE ORAL
Qty: 90 CAPSULE | Refills: 0 | Status: SHIPPED | OUTPATIENT
Start: 2020-04-23 | End: 2020-06-08

## 2020-04-23 NOTE — TELEPHONE ENCOUNTER
pt's daughter calling to see if med review appt is still needed, should be postponed, or should be via phone

## 2020-04-23 NOTE — TELEPHONE ENCOUNTER
She wont need a follow up for medication unti June, hopefully we can see her in the office at that point, but if not we can do a telephone or video visit. If she needs anything in the  Meantime, we are happy to schedule a telephone or video visit sooner.

## 2020-04-23 NOTE — TELEPHONE ENCOUNTER
Medication: HYDROcodone-acetaminophen  MG Oral Tab      Date of last refill: 4/3/2020  [place] 9/9/6151   Verified by MF    Medication: gabapentin 300 MG Oral Cap    Date of last refill:  3/3/2020    Last office vi

## 2020-04-23 NOTE — TELEPHONE ENCOUNTER
2 SCRIPTS: norco & gabapentin    Pharmacy verified      Patient informed of 48 hour refill policy excluding weekends and holidays. Informed patient only patient can  the prescription effective April 1, 2017.   Further explained patient will not recei

## 2020-05-21 ENCOUNTER — TELEPHONE (OUTPATIENT)
Dept: PAIN CLINIC | Facility: CLINIC | Age: 85
End: 2020-05-21

## 2020-05-21 DIAGNOSIS — M43.07 LUMBOSACRAL SPONDYLOLYSIS: ICD-10-CM

## 2020-05-21 DIAGNOSIS — M54.16 LUMBAR RADICULOPATHY: ICD-10-CM

## 2020-05-21 DIAGNOSIS — M47.819 ARTHROPATHY OF FACET JOINT: ICD-10-CM

## 2020-05-21 NOTE — TELEPHONE ENCOUNTER
Pt req HYDROcodone-acetaminophen  MG Oral Tab    gabapentin 300 MG Oral Cap    (pharmacy verified)    Patient informed of 48 hour refill policy excluding weekends and holidays.  Informed patient only patient can  the prescription effective Apri

## 2020-05-22 NOTE — TELEPHONE ENCOUNTER
Medication: HYDROcodone-acetaminophen  MG Oral Tab    gabapentin 300 MG Oral Cap    Date of last refill: 5/4/20  [place] 8/3/38    Last office visit: 3/16/20  Due back to clinic per last office note: None noted   D

## 2020-05-27 RX ORDER — HYDROCODONE BITARTRATE AND ACETAMINOPHEN 10; 325 MG/1; MG/1
1 TABLET ORAL EVERY 8 HOURS PRN
Qty: 90 TABLET | Refills: 0 | Status: SHIPPED | OUTPATIENT
Start: 2020-05-27 | End: 2020-06-30

## 2020-05-28 NOTE — TELEPHONE ENCOUNTER
Patient offered to schedule procedure, patient declined, states she is not feeling up to it now, will call when ready.

## 2020-06-05 DIAGNOSIS — M43.07 LUMBOSACRAL SPONDYLOLYSIS: ICD-10-CM

## 2020-06-05 DIAGNOSIS — M54.16 LUMBAR RADICULOPATHY: ICD-10-CM

## 2020-06-05 DIAGNOSIS — M47.819 ARTHROPATHY OF FACET JOINT: ICD-10-CM

## 2020-06-08 ENCOUNTER — OFFICE VISIT (OUTPATIENT)
Dept: PAIN CLINIC | Facility: CLINIC | Age: 85
End: 2020-06-08
Payer: MEDICARE

## 2020-06-08 VITALS — WEIGHT: 170 LBS | HEIGHT: 62 IN | BODY MASS INDEX: 31.28 KG/M2

## 2020-06-08 DIAGNOSIS — M43.07 LUMBOSACRAL SPONDYLOLYSIS: ICD-10-CM

## 2020-06-08 DIAGNOSIS — M47.819 ARTHROPATHY OF FACET JOINT: ICD-10-CM

## 2020-06-08 DIAGNOSIS — M54.16 LUMBAR RADICULOPATHY: Primary | ICD-10-CM

## 2020-06-08 DIAGNOSIS — Z13.820 OSTEOPOROSIS SCREENING: ICD-10-CM

## 2020-06-08 PROCEDURE — 99214 OFFICE O/P EST MOD 30 MIN: CPT | Performed by: NURSE PRACTITIONER

## 2020-06-08 RX ORDER — TIZANIDINE 2 MG/1
TABLET ORAL
Qty: 120 TABLET | Refills: 0 | Status: SHIPPED | OUTPATIENT
Start: 2020-06-08 | End: 2021-03-19

## 2020-06-08 RX ORDER — GABAPENTIN 300 MG/1
CAPSULE ORAL
Qty: 90 CAPSULE | Refills: 0 | Status: SHIPPED | OUTPATIENT
Start: 2020-06-08 | End: 2020-07-02

## 2020-06-08 NOTE — PROGRESS NOTES
Name: Nando Hutchinson   : 1935   DOS: 2020     Pain Clinic Follow Up Visit:   Nando Hutchinson is a 82year old female who presents for medication recheck for her chronic low back pain.   She is status post bilateral lumbar facet joint inject Delayed Release      • simvastatin 10 MG Oral Tab Take 1 tablet (10 mg total) by mouth nightly. 90 tablet 3   • Levothyroxine Sodium 125 MCG Oral Tab Take 125 mcg by mouth daily.      • Albuterol Sulfate HFA (PROAIR HFA) 108 (90 BASE) MCG/ACT Inhalation Aer Prescriptions Disp Refills   • Naloxegol Oxalate (MOVANTIK) 12.5 MG Oral Tab 30 tablet 0     Sig: Take 1 tablet by mouth daily as needed.    • tiZANidine HCl 2 MG Oral Tab 120 tablet 0     Sig: Take one to two tablets every 8 hours if needed for muscle spas

## 2020-06-08 NOTE — PROGRESS NOTES
Patient presents in office today with reported pain in lower back down right leg. Swelling in left ankle and foot at times. Pt completed bilat facets on 3/3/2020. Pt reports that pain increases after injections.  Pt feels that she has bone damage from s

## 2020-06-09 RX ORDER — GABAPENTIN 300 MG/1
CAPSULE ORAL
Qty: 90 CAPSULE | Refills: 0 | OUTPATIENT
Start: 2020-06-09

## 2020-06-11 ENCOUNTER — TELEPHONE (OUTPATIENT)
Dept: PAIN CLINIC | Facility: CLINIC | Age: 85
End: 2020-06-11

## 2020-06-23 ENCOUNTER — OFFICE VISIT (OUTPATIENT)
Dept: INTERNAL MEDICINE CLINIC | Facility: CLINIC | Age: 85
End: 2020-06-23
Payer: MEDICARE

## 2020-06-23 DIAGNOSIS — Z00.00 ENCOUNTER FOR ANNUAL HEALTH EXAMINATION: Primary | ICD-10-CM

## 2020-06-23 DIAGNOSIS — M47.816 LUMBAR SPONDYLOSIS: ICD-10-CM

## 2020-06-23 DIAGNOSIS — N81.10 BLADDER PROLAPSE, FEMALE, ACQUIRED: ICD-10-CM

## 2020-06-23 DIAGNOSIS — K21.9 GASTROESOPHAGEAL REFLUX DISEASE WITHOUT ESOPHAGITIS: ICD-10-CM

## 2020-06-23 DIAGNOSIS — E11.9 TYPE 2 DIABETES MELLITUS WITHOUT COMPLICATION, WITHOUT LONG-TERM CURRENT USE OF INSULIN (HCC): ICD-10-CM

## 2020-06-23 DIAGNOSIS — M47.816 ARTHRITIS OF FACET JOINT OF LUMBAR SPINE: ICD-10-CM

## 2020-06-23 DIAGNOSIS — I10 ESSENTIAL HYPERTENSION: ICD-10-CM

## 2020-06-23 DIAGNOSIS — I83.813 VARICOSE VEINS OF BILATERAL LOWER EXTREMITIES WITH PAIN: ICD-10-CM

## 2020-06-23 DIAGNOSIS — M54.16 LUMBAR RADICULOPATHY: ICD-10-CM

## 2020-06-23 DIAGNOSIS — M43.07 LUMBOSACRAL SPONDYLOLYSIS: ICD-10-CM

## 2020-06-23 DIAGNOSIS — E03.9 ACQUIRED HYPOTHYROIDISM: ICD-10-CM

## 2020-06-23 DIAGNOSIS — M53.3 SACROILIAC JOINT DYSFUNCTION: ICD-10-CM

## 2020-06-23 DIAGNOSIS — M47.819 ARTHROPATHY OF FACET JOINT: ICD-10-CM

## 2020-06-23 DIAGNOSIS — J45.30 MILD PERSISTENT ASTHMA WITHOUT COMPLICATION: ICD-10-CM

## 2020-06-23 DIAGNOSIS — A49.02: ICD-10-CM

## 2020-06-23 DIAGNOSIS — E78.2 MIXED HYPERLIPIDEMIA: ICD-10-CM

## 2020-06-23 DIAGNOSIS — J30.2 SEASONAL ALLERGIES: ICD-10-CM

## 2020-06-23 DIAGNOSIS — R10.9 FLANK PAIN: ICD-10-CM

## 2020-06-23 DIAGNOSIS — F32.1 MAJOR DEPRESSIVE DISORDER, SINGLE EPISODE, MODERATE (HCC): ICD-10-CM

## 2020-06-23 PROCEDURE — 99397 PER PM REEVAL EST PAT 65+ YR: CPT | Performed by: PHYSICIAN ASSISTANT

## 2020-06-23 PROCEDURE — 96160 PT-FOCUSED HLTH RISK ASSMT: CPT | Performed by: PHYSICIAN ASSISTANT

## 2020-06-23 PROCEDURE — G0439 PPPS, SUBSEQ VISIT: HCPCS | Performed by: PHYSICIAN ASSISTANT

## 2020-06-23 RX ORDER — SIMVASTATIN 10 MG
10 TABLET ORAL NIGHTLY
Qty: 90 TABLET | Refills: 1 | Status: SHIPPED | OUTPATIENT
Start: 2020-06-23 | End: 2020-12-01 | Stop reason: ALTCHOICE

## 2020-06-23 RX ORDER — VALSARTAN 80 MG/1
80 TABLET ORAL DAILY
Qty: 90 TABLET | Refills: 1 | Status: SHIPPED | OUTPATIENT
Start: 2020-06-23 | End: 2021-01-04

## 2020-06-23 RX ORDER — MONTELUKAST SODIUM 10 MG/1
10 TABLET ORAL NIGHTLY
Qty: 90 TABLET | Refills: 1 | Status: SHIPPED | OUTPATIENT
Start: 2020-06-23 | End: 2021-01-04

## 2020-06-23 RX ORDER — LEVOTHYROXINE SODIUM 0.12 MG/1
125 TABLET ORAL DAILY
Qty: 90 TABLET | Refills: 1 | Status: SHIPPED | OUTPATIENT
Start: 2020-06-23 | End: 2021-01-04

## 2020-06-23 NOTE — PROGRESS NOTES
HPI:   Fabiana Christopher is a 82year old female who presents for a MA (Medicare Advantage) Supervisit (Once per calendar year). DM: overdue for labs. Does not check blood sugar at home.  Last eye exam 3 yrs ago, cannot remember his name and unsure if difficulties Managing Money/Bills based on screening of functional status. Managing money/bills: Need some help  She has difficulties Shopping for Groceries based on screening of functional status.    Shop for groceries: Need some help   She has difficult Kimberly Pollock DO as Consulting Physician (NEUROLOGY)    Patient Active Problem List:     MRSA infection, non-invasive     Mild persistent asthma without complication     Mixed hyperlipidemia     Major depressive disorder, single episode, moderate ([address]     Gas (PROAIR HFA) 108 (90 BASE) MCG/ACT Inhalation Aero Soln, Inhale 1 puff into the lungs every 6 (six) hours as needed for Wheezing.        MEDICAL INFORMATION:   She  has a past medical history of Asthma, Diabetes mellitus ([address] GERD (gastroesophageal reflux as calculated from the following:    Height as of this encounter: 62\". Weight as of this encounter: 162 lb (73.5 kg). Medicare Hearing Assessment  (Required for AWV/SWV)    Hearing Screening    Time taken:  6/23/2020 12:58 PM  Screening Method:   RAUDEL Spears well.     Vaccination History     Immunization History   Administered Date(s) Administered   • Fluvirin, 3 Years & >, Im 08/27/2012   • Fluzone Vaccine Medicare () 09/06/2011, 09/19/2013, 01/22/2015, 09/15/2016, 10/13/2017, 10/19/2018, 11/07/2019   • by mouth nightly. Sacroiliac joint dysfunction- sees pain specialty  -     Montelukast Sodium 10 MG Oral Tab; Take 1 tablet (10 mg total) by mouth nightly. -     simvastatin 10 MG Oral Tab; Take 1 tablet (10 mg total) by mouth nightly.     Arthropathy o Maintenance if applicable    Flex Sigmoidoscopy Screen every 10 years No results found for this or any previous visit. No flowsheet data found. Fecal Occult Blood Annually No results found for: FOB No flowsheet data found.     Glaucoma Screening      Op This may be covered with your pharmacy  prescription benefits      SPECIFIC DISEASE MONITORING Internal Lab or Procedure External Lab or Procedure      Annual Monitoring of Persistent     Medications (ACE/ARB, digoxin diuretics, anticonvulsants.)    [place]

## 2020-06-23 NOTE — PATIENT INSTRUCTIONS
Have labs drawn, fasting 8-10 hours prior (water before is ok). Schedule ultrasound of abdomen also   Call your eye doctor. If he takes your insurance, let us know and we will put a referral in to see him.  If he does not, schedule an appointment with  criteria:   • Men who are 73-68 years old and have smoked more than 100 cigarettes in their lifetime   • Anyone with a family history    Colorectal Cancer Screening  Covered up to Age 76     Colonoscopy Screen   Covered every 10 years- more often if abnorm Immunizations      Influenza  Covered Annually No orders found for this or any previous visit. Please get every year    Pneumococcal 13 (Prevnar)  Covered Once after 65 No orders found for this or any previous visit.  Please get once after your 65th birth

## 2020-06-24 VITALS
HEART RATE: 72 BPM | BODY MASS INDEX: 29.81 KG/M2 | WEIGHT: 162 LBS | SYSTOLIC BLOOD PRESSURE: 138 MMHG | DIASTOLIC BLOOD PRESSURE: 80 MMHG | RESPIRATION RATE: 16 BRPM | TEMPERATURE: 99 F | HEIGHT: 62 IN

## 2020-06-24 PROBLEM — M47.816 ARTHRITIS OF FACET JOINT OF LUMBAR SPINE: Status: ACTIVE | Noted: 2020-06-24

## 2020-06-30 DIAGNOSIS — M43.07 LUMBOSACRAL SPONDYLOLYSIS: ICD-10-CM

## 2020-06-30 DIAGNOSIS — M54.16 LUMBAR RADICULOPATHY: ICD-10-CM

## 2020-06-30 DIAGNOSIS — M47.819 ARTHROPATHY OF FACET JOINT: ICD-10-CM

## 2020-06-30 NOTE — TELEPHONE ENCOUNTER
Medication: gabapentin 300 MG Oral Cap     Date of last refill: 6/8/2020  [place]    Medication: HYDROcodone-acetaminophen  MG Oral Tab    Date of last refill: 5/27/2020  [place] evaluation of bone density.      The patient indicates understanding of these issues and agrees to the plan.     Return in about 3 months (around 9/8/2020).   For medication recheck.      Franchesca Weiss, SUZAN, 6/8/2020, 11:36 AM

## 2020-07-02 RX ORDER — HYDROCODONE BITARTRATE AND ACETAMINOPHEN 10; 325 MG/1; MG/1
1 TABLET ORAL EVERY 8 HOURS PRN
Qty: 90 TABLET | Refills: 0 | Status: SHIPPED | OUTPATIENT
Start: 2020-07-07 | End: 2020-07-29

## 2020-07-02 RX ORDER — GABAPENTIN 300 MG/1
CAPSULE ORAL
Qty: 90 CAPSULE | Refills: 0 | Status: SHIPPED | OUTPATIENT
Start: 2020-07-02 | End: 2020-07-31

## 2020-07-29 DIAGNOSIS — M47.819 ARTHROPATHY OF FACET JOINT: ICD-10-CM

## 2020-07-29 DIAGNOSIS — M43.07 LUMBOSACRAL SPONDYLOLYSIS: ICD-10-CM

## 2020-07-29 DIAGNOSIS — M54.16 LUMBAR RADICULOPATHY: ICD-10-CM

## 2020-07-29 NOTE — TELEPHONE ENCOUNTER
Requesting refill for mother for both Rx, Hydrocodone and Gabapentin. Send to [place] [id number]. Patient informed of 48 hour refill policy excluding weekends and holidays. Informed patient only patient can  the prescription effective April 1, 2017.   Fu

## 2020-07-29 NOTE — TELEPHONE ENCOUNTER
Medication: HYDROcodone-acetaminophen  MG Oral Tab  Date of last refill: 07/07/2020  [place] 09/49/5558    Medication: gabapentin 300 MG Oral Cap  Date of last refill: 07/02/2020  [place] the plan.     Return in about 3 months (around 9/8/2020).   For medication recheck.      SUZAN Dunn, 6/8/2020, 11:36 AM

## 2020-07-31 RX ORDER — HYDROCODONE BITARTRATE AND ACETAMINOPHEN 10; 325 MG/1; MG/1
1 TABLET ORAL EVERY 8 HOURS PRN
Qty: 90 TABLET | Refills: 0 | Status: SHIPPED | OUTPATIENT
Start: 2020-08-05 | End: 2020-08-10

## 2020-07-31 RX ORDER — GABAPENTIN 300 MG/1
CAPSULE ORAL
Qty: 90 CAPSULE | Refills: 0 | Status: SHIPPED | OUTPATIENT
Start: 2020-07-31 | End: 2020-08-10

## 2020-08-10 ENCOUNTER — TELEPHONE (OUTPATIENT)
Dept: SURGERY | Facility: CLINIC | Age: 85
End: 2020-08-10

## 2020-08-10 DIAGNOSIS — M43.07 LUMBOSACRAL SPONDYLOLYSIS: ICD-10-CM

## 2020-08-10 DIAGNOSIS — M54.16 LUMBAR RADICULOPATHY: ICD-10-CM

## 2020-08-10 DIAGNOSIS — M47.819 ARTHROPATHY OF FACET JOINT: ICD-10-CM

## 2020-08-10 RX ORDER — GABAPENTIN 300 MG/1
CAPSULE ORAL
Qty: 90 CAPSULE | Refills: 0 | Status: SHIPPED | OUTPATIENT
Start: 2020-08-10 | End: 2020-08-31

## 2020-08-10 RX ORDER — HYDROCODONE BITARTRATE AND ACETAMINOPHEN 10; 325 MG/1; MG/1
TABLET ORAL
Qty: 90 TABLET | Refills: 0 | Status: SHIPPED | OUTPATIENT
Start: 2020-08-10 | End: 2020-08-27

## 2020-08-10 NOTE — TELEPHONE ENCOUNTER
Pharmacy [facility] in [place] on [address] hydrocodone and gabapentin. Daughter Nadege Sofia said she called last Tuesday (we have no record) and requested the meds. Nadege Sofia states mom is out of medication today. Please advise. Please call Nadege Bismark [phone].

## 2020-08-26 ENCOUNTER — OFFICE VISIT (OUTPATIENT)
Dept: INTERNAL MEDICINE CLINIC | Facility: CLINIC | Age: 85
End: 2020-08-26
Payer: MEDICARE

## 2020-08-26 VITALS
DIASTOLIC BLOOD PRESSURE: 72 MMHG | SYSTOLIC BLOOD PRESSURE: 124 MMHG | BODY MASS INDEX: 29.81 KG/M2 | RESPIRATION RATE: 16 BRPM | WEIGHT: 162 LBS | HEIGHT: 62 IN | TEMPERATURE: 98 F

## 2020-08-26 DIAGNOSIS — B02.9 HERPES ZOSTER WITHOUT COMPLICATION: Primary | ICD-10-CM

## 2020-08-26 PROCEDURE — 99213 OFFICE O/P EST LOW 20 MIN: CPT | Performed by: PHYSICIAN ASSISTANT

## 2020-08-26 PROCEDURE — 3074F SYST BP LT 130 MM HG: CPT | Performed by: PHYSICIAN ASSISTANT

## 2020-08-26 PROCEDURE — 3008F BODY MASS INDEX DOCD: CPT | Performed by: PHYSICIAN ASSISTANT

## 2020-08-26 PROCEDURE — 3078F DIAST BP <80 MM HG: CPT | Performed by: PHYSICIAN ASSISTANT

## 2020-08-26 RX ORDER — FAMCICLOVIR 500 MG/1
500 TABLET, FILM COATED ORAL 3 TIMES DAILY
Qty: 21 TABLET | Refills: 0 | Status: SHIPPED | OUTPATIENT
Start: 2020-08-26 | End: 2020-12-03 | Stop reason: ALTCHOICE

## 2020-08-26 NOTE — PATIENT INSTRUCTIONS
Take famciclovir 3 times daily for 7 days.  Continue your other usual medications    Go to ER if: you get severe pain; the rash spreads up, down, or to the other side of the body; worsened pain in the ear; weakness in the face; fever; hearing loss on the ri neuralgia. Take these medicines as directed. · Compresses made from a solution of cool water mixed with cornstarch or baking soda may help relieve pain and itching.   · Gently wash skin daily with soap and water to help prevent infection.  Be certain to ri

## 2020-08-26 NOTE — PROGRESS NOTES
HPI:    Patient ID: Gin Sousa is a 82year old female. Patient presents with:  Rash: Red and painful rash on back of neck  4 days  Fatigue     This is a new problem. Episode onset: 3d. The problem has been gradually worsening since onset.  The affe • Amitriptyline HCl 25 MG Oral Tab Take 1 tablet (25 mg total) by mouth nightly. 30 tablet 0   • omeprazole 20 MG Oral Capsule Delayed Release      • aspirin EC 81 MG Oral Tab EC Take 1 tablet (81 mg total) by mouth daily.  90 tablet 3   • Albuterol Sulfate Herpes zoster without complication  (primary encounter diagnosis)    -start famciclovir as follows  -monitor rash.  Discussed possible complications, including disseminated zoster or samira hunt syndrome; ER if onset of inner ear pain, facial weakness, hear

## 2020-08-27 DIAGNOSIS — M54.16 LUMBAR RADICULOPATHY: ICD-10-CM

## 2020-08-27 DIAGNOSIS — M47.819 ARTHROPATHY OF FACET JOINT: ICD-10-CM

## 2020-08-27 DIAGNOSIS — M43.07 LUMBOSACRAL SPONDYLOLYSIS: ICD-10-CM

## 2020-08-27 NOTE — TELEPHONE ENCOUNTER
Pt's daughter requesting refill for mother, Hydrocodone and Gabapentin. Send to [place] [phone] in [address] [facility],[address]. Patient informed of 48 hour refill policy excluding weekends and holidays.  Informed patient only patient can  the prescripti

## 2020-08-27 NOTE — TELEPHONE ENCOUNTER
Medication: GABAPENTIN 300 MG Oral Cap    Date of last refill: 8/10/2020  [place]    Medication: HYDROCODONE-ACETAMINOPHEN  MG Oral Tab    Date of last refill: 8/10/2020  [place] agrees to the plan.     Return in about 3 months (around 9/8/2020).   For medication recheck.      Franchesca Do, SUZAN, 6/8/2020, 11:36 AM

## 2020-08-31 RX ORDER — HYDROCODONE BITARTRATE AND ACETAMINOPHEN 10; 325 MG/1; MG/1
1 TABLET ORAL EVERY 8 HOURS PRN
Qty: 90 TABLET | Refills: 0 | Status: SHIPPED | OUTPATIENT
Start: 2020-09-08 | End: 2020-09-29

## 2020-08-31 RX ORDER — GABAPENTIN 300 MG/1
300 CAPSULE ORAL 3 TIMES DAILY
Qty: 90 CAPSULE | Refills: 0 | Status: SHIPPED | OUTPATIENT
Start: 2020-08-31 | End: 2020-09-30

## 2020-09-01 ENCOUNTER — OFFICE VISIT (OUTPATIENT)
Dept: INTERNAL MEDICINE CLINIC | Facility: CLINIC | Age: 85
End: 2020-09-01
Payer: MEDICARE

## 2020-09-01 ENCOUNTER — LAB ENCOUNTER (OUTPATIENT)
Dept: LAB | Age: 85
End: 2020-09-01
Attending: PHYSICIAN ASSISTANT
Payer: MEDICARE

## 2020-09-01 VITALS
WEIGHT: 162 LBS | BODY MASS INDEX: 29.81 KG/M2 | RESPIRATION RATE: 16 BRPM | HEIGHT: 62 IN | TEMPERATURE: 98 F | SYSTOLIC BLOOD PRESSURE: 128 MMHG | HEART RATE: 84 BPM | DIASTOLIC BLOOD PRESSURE: 72 MMHG

## 2020-09-01 DIAGNOSIS — E11.29 TYPE 2 DIABETES MELLITUS WITH MICROALBUMINURIA, WITHOUT LONG-TERM CURRENT USE OF INSULIN (HCC): ICD-10-CM

## 2020-09-01 DIAGNOSIS — B02.8 HERPES ZOSTER WITH COMPLICATION: Primary | ICD-10-CM

## 2020-09-01 DIAGNOSIS — R80.9 TYPE 2 DIABETES MELLITUS WITH MICROALBUMINURIA, WITHOUT LONG-TERM CURRENT USE OF INSULIN (HCC): ICD-10-CM

## 2020-09-01 DIAGNOSIS — I10 ESSENTIAL HYPERTENSION: ICD-10-CM

## 2020-09-01 DIAGNOSIS — E11.9 TYPE 2 DIABETES MELLITUS WITHOUT COMPLICATION, WITHOUT LONG-TERM CURRENT USE OF INSULIN (HCC): ICD-10-CM

## 2020-09-01 DIAGNOSIS — B02.29 POSTHERPETIC NEURALGIA: ICD-10-CM

## 2020-09-01 DIAGNOSIS — E78.5 DYSLIPIDEMIA: ICD-10-CM

## 2020-09-01 LAB
ALBUMIN SERPL-MCNC: 3.5 G/DL (ref 3.4–5)
ALBUMIN/GLOB SERPL: 0.9 {RATIO} (ref 1–2)
ALP LIVER SERPL-CCNC: 66 U/L (ref 55–142)
ALT SERPL-CCNC: 23 U/L (ref 13–56)
ANION GAP SERPL CALC-SCNC: 4 MMOL/L (ref 0–18)
AST SERPL-CCNC: 17 U/L (ref 15–37)
BASOPHILS # BLD AUTO: 0.04 X10(3) UL (ref 0–0.2)
BASOPHILS NFR BLD AUTO: 0.7 %
BILIRUB SERPL-MCNC: 0.3 MG/DL (ref 0.1–2)
BILIRUB UR QL STRIP.AUTO: NEGATIVE
BUN BLD-MCNC: 15 MG/DL (ref 7–18)
BUN/CREAT SERPL: 18.8 (ref 10–20)
CALCIUM BLD-MCNC: 9.1 MG/DL (ref 8.5–10.1)
CHLORIDE SERPL-SCNC: 108 MMOL/L (ref 98–112)
CHOLEST SMN-MCNC: 105 MG/DL (ref ?–200)
CLARITY UR REFRACT.AUTO: CLEAR
CO2 SERPL-SCNC: 28 MMOL/L (ref 21–32)
COLOR UR AUTO: YELLOW
CREAT BLD-MCNC: 0.8 MG/DL (ref 0.55–1.02)
CREAT UR-SCNC: 141 MG/DL
DEPRECATED RDW RBC AUTO: 41.9 FL (ref 35.1–46.3)
EOSINOPHIL # BLD AUTO: 0.13 X10(3) UL (ref 0–0.7)
EOSINOPHIL NFR BLD AUTO: 2.2 %
ERYTHROCYTE [DISTWIDTH] IN BLOOD BY AUTOMATED COUNT: 12.8 % (ref 11–15)
EST. AVERAGE GLUCOSE BLD GHB EST-MCNC: 140 MG/DL (ref 68–126)
GLOBULIN PLAS-MCNC: 3.9 G/DL (ref 2.8–4.4)
GLUCOSE BLD-MCNC: 109 MG/DL (ref 70–99)
GLUCOSE UR STRIP.AUTO-MCNC: NEGATIVE MG/DL
HBA1C MFR BLD HPLC: 6.5 % (ref ?–5.7)
HCT VFR BLD AUTO: 37.3 % (ref 35–48)
HDLC SERPL-MCNC: 36 MG/DL (ref 40–59)
HGB BLD-MCNC: 12 G/DL (ref 12–16)
IMM GRANULOCYTES # BLD AUTO: 0.02 X10(3) UL (ref 0–1)
IMM GRANULOCYTES NFR BLD: 0.3 %
KETONES UR STRIP.AUTO-MCNC: NEGATIVE MG/DL
LDLC SERPL CALC-MCNC: 44 MG/DL (ref ?–100)
LEUKOCYTE ESTERASE UR QL STRIP.AUTO: NEGATIVE
LYMPHOCYTES # BLD AUTO: 1.9 X10(3) UL (ref 1–4)
LYMPHOCYTES NFR BLD AUTO: 31.9 %
M PROTEIN MFR SERPL ELPH: 7.4 G/DL (ref 6.4–8.2)
MCH RBC QN AUTO: 28.8 PG (ref 26–34)
MCHC RBC AUTO-ENTMCNC: 32.2 G/DL (ref 31–37)
MCV RBC AUTO: 89.4 FL (ref 80–100)
MICROALBUMIN UR-MCNC: 14 MG/DL
MICROALBUMIN/CREAT 24H UR-RTO: 99.3 UG/MG (ref ?–30)
MONOCYTES # BLD AUTO: 0.5 X10(3) UL (ref 0.1–1)
MONOCYTES NFR BLD AUTO: 8.4 %
NEUTROPHILS # BLD AUTO: 3.36 X10 (3) UL (ref 1.5–7.7)
NEUTROPHILS # BLD AUTO: 3.36 X10(3) UL (ref 1.5–7.7)
NEUTROPHILS NFR BLD AUTO: 56.5 %
NITRITE UR QL STRIP.AUTO: NEGATIVE
NONHDLC SERPL-MCNC: 69 MG/DL (ref ?–130)
OSMOLALITY SERPL CALC.SUM OF ELEC: 291 MOSM/KG (ref 275–295)
PATIENT FASTING Y/N/NP: NO
PATIENT FASTING Y/N/NP: NO
PH UR STRIP.AUTO: 5 [PH] (ref 4.5–8)
PLATELET # BLD AUTO: 213 10(3)UL (ref 150–450)
POTASSIUM SERPL-SCNC: 3.4 MMOL/L (ref 3.5–5.1)
PROT UR STRIP.AUTO-MCNC: NEGATIVE MG/DL
RBC # BLD AUTO: 4.17 X10(6)UL (ref 3.8–5.3)
RBC UR QL AUTO: NEGATIVE
SODIUM SERPL-SCNC: 140 MMOL/L (ref 136–145)
SP GR UR STRIP.AUTO: 1.02 (ref 1–1.03)
TRIGL SERPL-MCNC: 127 MG/DL (ref 30–149)
TSI SER-ACNC: 1.04 MIU/ML (ref 0.36–3.74)
UROBILINOGEN UR STRIP.AUTO-MCNC: <2 MG/DL
VLDLC SERPL CALC-MCNC: 25 MG/DL (ref 0–30)
WBC # BLD AUTO: 6 X10(3) UL (ref 4–11)

## 2020-09-01 PROCEDURE — 85025 COMPLETE CBC W/AUTO DIFF WBC: CPT

## 2020-09-01 PROCEDURE — 36415 COLL VENOUS BLD VENIPUNCTURE: CPT

## 2020-09-01 PROCEDURE — 82043 UR ALBUMIN QUANTITATIVE: CPT

## 2020-09-01 PROCEDURE — 84443 ASSAY THYROID STIM HORMONE: CPT

## 2020-09-01 PROCEDURE — 83036 HEMOGLOBIN GLYCOSYLATED A1C: CPT

## 2020-09-01 PROCEDURE — 82570 ASSAY OF URINE CREATININE: CPT

## 2020-09-01 PROCEDURE — 81001 URINALYSIS AUTO W/SCOPE: CPT

## 2020-09-01 PROCEDURE — 3074F SYST BP LT 130 MM HG: CPT | Performed by: PHYSICIAN ASSISTANT

## 2020-09-01 PROCEDURE — 80053 COMPREHEN METABOLIC PANEL: CPT

## 2020-09-01 PROCEDURE — 3008F BODY MASS INDEX DOCD: CPT | Performed by: PHYSICIAN ASSISTANT

## 2020-09-01 PROCEDURE — 80061 LIPID PANEL: CPT

## 2020-09-01 PROCEDURE — 3078F DIAST BP <80 MM HG: CPT | Performed by: PHYSICIAN ASSISTANT

## 2020-09-01 PROCEDURE — 99214 OFFICE O/P EST MOD 30 MIN: CPT | Performed by: PHYSICIAN ASSISTANT

## 2020-09-01 NOTE — PATIENT INSTRUCTIONS
Increase gabapentin: take your normal doses during the day, and increase to 2 pills at bedtime  Have blood drawn today

## 2020-09-02 NOTE — PROGRESS NOTES
HPI:    Patient ID: Taisha Anderson is a 82year old female. Patient presents with:  Skin: 1 week f/u shigles, pain unchanges      Since last visit  Pt took famciclovir as directed, last dose is today  Reports rash has gradually improved.  She still expr • triamcinolone acetonide 0.1 % External Cream Apply topically. • omeprazole 20 MG Oral Capsule Delayed Release      • aspirin EC 81 MG Oral Tab EC Take 1 tablet (81 mg total) by mouth daily.  90 tablet 3   • Albuterol Sulfate HFA (PROAIR HFA) 108 (90 B Type 2 diabetes mellitus with microalbuminuria, without long-term current use of insulin (hcc)  Postherpetic neuralgia    -finish famciclovir   -discussed increasing gabapentin to manage postherpetic neuralgia: will maintain daytime dosing at 300mg, increa

## 2020-09-29 DIAGNOSIS — M47.819 ARTHROPATHY OF FACET JOINT: ICD-10-CM

## 2020-09-29 DIAGNOSIS — M43.07 LUMBOSACRAL SPONDYLOLYSIS: ICD-10-CM

## 2020-09-29 DIAGNOSIS — M54.16 LUMBAR RADICULOPATHY: ICD-10-CM

## 2020-09-29 NOTE — TELEPHONE ENCOUNTER
2 SCRIPTS: hydrocodone & gabapentin      Pharmacy verified      Patient informed of 48 hour refill policy excluding weekends and holidays. Informed patient only patient can  the prescription effective April 1, 2017.   Further explained patient will n

## 2020-09-30 RX ORDER — HYDROCODONE BITARTRATE AND ACETAMINOPHEN 10; 325 MG/1; MG/1
1 TABLET ORAL EVERY 8 HOURS PRN
Qty: 90 TABLET | Refills: 0 | Status: SHIPPED | OUTPATIENT
Start: 2020-10-07 | End: 2020-11-05

## 2020-09-30 RX ORDER — GABAPENTIN 300 MG/1
300 CAPSULE ORAL 3 TIMES DAILY
Qty: 90 CAPSULE | Refills: 0 | Status: SHIPPED | OUTPATIENT
Start: 2020-09-30 | End: 2020-11-05

## 2020-09-30 NOTE — TELEPHONE ENCOUNTER
Medication: GABAPENTIN 300 MG Oral Cap     Date of last refill: 8/31/2020  [place]     Medication: HYDROCODONE-ACETAMINOPHEN  MG Oral Tab     Date of last refill: 9/8/2020  [place]if [place] and agrees to the plan.     Return in about 3 months (around 9/8/2020).   For medication recheck.      SUZAN Mccartney, 6/8/2020, 11:36 AM

## 2020-11-05 DIAGNOSIS — M54.16 LUMBAR RADICULOPATHY: ICD-10-CM

## 2020-11-05 DIAGNOSIS — M43.07 LUMBOSACRAL SPONDYLOLYSIS: ICD-10-CM

## 2020-11-05 DIAGNOSIS — M47.819 ARTHROPATHY OF FACET JOINT: ICD-10-CM

## 2020-11-05 RX ORDER — HYDROCODONE BITARTRATE AND ACETAMINOPHEN 10; 325 MG/1; MG/1
1 TABLET ORAL EVERY 8 HOURS PRN
Qty: 90 TABLET | Refills: 0 | Status: SHIPPED | OUTPATIENT
Start: 2020-11-05 | End: 2020-11-25

## 2020-11-05 RX ORDER — HYDROCODONE BITARTRATE AND ACETAMINOPHEN 10; 325 MG/1; MG/1
1 TABLET ORAL EVERY 8 HOURS PRN
Qty: 90 TABLET | Refills: 0 | Status: SHIPPED | OUTPATIENT
Start: 2020-11-05 | End: 2020-12-31

## 2020-11-05 RX ORDER — GABAPENTIN 300 MG/1
300 CAPSULE ORAL 3 TIMES DAILY
Qty: 90 CAPSULE | Refills: 0 | Status: SHIPPED | OUTPATIENT
Start: 2020-11-05 | End: 2020-11-25

## 2020-11-05 NOTE — TELEPHONE ENCOUNTER
Medication: HYDROcodone-acetaminophen  MG Oral Tab    Date of last refill: 10/5/20   [place] 26/8/05     Gabapentin 300 mg     Date of last refill - 9/30/20       Last office visit: 6/8/20  Due back to clinic per l medication recheck.      Franchesca Sampson, SUZAN, 6/8/2020, 11:36 AM

## 2020-11-05 NOTE — TELEPHONE ENCOUNTER
Daughter forgot to call in refills - hydrocodone and gabapentin refills send to [facility] in [place]. She thinks her mom may be out. Would like Franchesca to call it in today if possible. Please call daughter.      Patient informed of 48 hour refill policy excluding we

## 2020-11-25 DIAGNOSIS — M43.07 LUMBOSACRAL SPONDYLOLYSIS: ICD-10-CM

## 2020-11-25 DIAGNOSIS — M47.819 ARTHROPATHY OF FACET JOINT: ICD-10-CM

## 2020-11-25 DIAGNOSIS — M54.16 LUMBAR RADICULOPATHY: ICD-10-CM

## 2020-11-25 RX ORDER — HYDROCODONE BITARTRATE AND ACETAMINOPHEN 10; 325 MG/1; MG/1
1 TABLET ORAL EVERY 8 HOURS PRN
Qty: 90 TABLET | Refills: 0 | Status: SHIPPED | OUTPATIENT
Start: 2020-11-25 | End: 2021-06-01

## 2020-11-25 RX ORDER — GABAPENTIN 300 MG/1
300 CAPSULE ORAL 3 TIMES DAILY
Qty: 90 CAPSULE | Refills: 0 | Status: SHIPPED | OUTPATIENT
Start: 2020-11-25 | End: 2020-12-31

## 2020-11-25 NOTE — TELEPHONE ENCOUNTER
2 SCRIPTS: norco & gabapentin      Pharmacy verified      Patient informed of 48 hour refill policy excluding weekends and holidays. Informed patient only patient can  the prescription effective April 1, 2017.   Further explained patient will not rec

## 2020-11-25 NOTE — TELEPHONE ENCOUNTER
Medication: HYDROcodone-acetaminophen  MG Oral Tab    Date of last refill: 11/6/2020  [place] 26/1/3893  Verified by MF    Medication: gabapentin 300 MG Oral Cap    Date of last refill: 11/5/2020    Last office vis SUZAN Miller, 6/8/2020, 11:36 AM

## 2020-12-01 ENCOUNTER — OFFICE VISIT (OUTPATIENT)
Dept: INTERNAL MEDICINE CLINIC | Facility: CLINIC | Age: 85
End: 2020-12-01
Payer: MEDICARE

## 2020-12-01 VITALS
HEART RATE: 70 BPM | WEIGHT: 164 LBS | DIASTOLIC BLOOD PRESSURE: 80 MMHG | HEIGHT: 62 IN | OXYGEN SATURATION: 97 % | RESPIRATION RATE: 16 BRPM | SYSTOLIC BLOOD PRESSURE: 138 MMHG | TEMPERATURE: 98 F | BODY MASS INDEX: 30.18 KG/M2

## 2020-12-01 DIAGNOSIS — Z23 NEED FOR INFLUENZA VACCINATION: ICD-10-CM

## 2020-12-01 DIAGNOSIS — L29.9 PRURITUS: ICD-10-CM

## 2020-12-01 DIAGNOSIS — M79.673 CHRONIC FOOT PAIN, UNSPECIFIED LATERALITY: ICD-10-CM

## 2020-12-01 DIAGNOSIS — I10 ESSENTIAL HYPERTENSION: ICD-10-CM

## 2020-12-01 DIAGNOSIS — E03.9 ACQUIRED HYPOTHYROIDISM: ICD-10-CM

## 2020-12-01 DIAGNOSIS — R51.9 INTERMITTENT HEADACHE: ICD-10-CM

## 2020-12-01 DIAGNOSIS — E78.2 MIXED HYPERLIPIDEMIA: ICD-10-CM

## 2020-12-01 DIAGNOSIS — R80.9 TYPE 2 DIABETES MELLITUS WITH MICROALBUMINURIA, WITHOUT LONG-TERM CURRENT USE OF INSULIN (HCC): Primary | ICD-10-CM

## 2020-12-01 DIAGNOSIS — Z86.73 HISTORY OF STROKE: ICD-10-CM

## 2020-12-01 DIAGNOSIS — B37.2 CANDIDAL INTERTRIGO: ICD-10-CM

## 2020-12-01 DIAGNOSIS — E11.29 TYPE 2 DIABETES MELLITUS WITH MICROALBUMINURIA, WITHOUT LONG-TERM CURRENT USE OF INSULIN (HCC): Primary | ICD-10-CM

## 2020-12-01 DIAGNOSIS — G89.29 CHRONIC FOOT PAIN, UNSPECIFIED LATERALITY: ICD-10-CM

## 2020-12-01 DIAGNOSIS — R68.89 FORGETFULNESS: ICD-10-CM

## 2020-12-01 PROCEDURE — 90662 IIV NO PRSV INCREASED AG IM: CPT | Performed by: PHYSICIAN ASSISTANT

## 2020-12-01 PROCEDURE — 3008F BODY MASS INDEX DOCD: CPT | Performed by: PHYSICIAN ASSISTANT

## 2020-12-01 PROCEDURE — 3075F SYST BP GE 130 - 139MM HG: CPT | Performed by: PHYSICIAN ASSISTANT

## 2020-12-01 PROCEDURE — 99214 OFFICE O/P EST MOD 30 MIN: CPT | Performed by: PHYSICIAN ASSISTANT

## 2020-12-01 PROCEDURE — 3079F DIAST BP 80-89 MM HG: CPT | Performed by: PHYSICIAN ASSISTANT

## 2020-12-01 PROCEDURE — G0008 ADMIN INFLUENZA VIRUS VAC: HCPCS | Performed by: PHYSICIAN ASSISTANT

## 2020-12-01 RX ORDER — ATORVASTATIN CALCIUM 40 MG/1
40 TABLET, FILM COATED ORAL NIGHTLY
Qty: 90 TABLET | Refills: 1 | Status: SHIPPED | OUTPATIENT
Start: 2020-12-01 | End: 2021-08-04

## 2020-12-01 RX ORDER — NYSTATIN 100000 [USP'U]/G
POWDER TOPICAL
Qty: 45 G | Refills: 0 | Status: SHIPPED | OUTPATIENT
Start: 2020-12-01

## 2020-12-01 NOTE — PATIENT INSTRUCTIONS
-Continue current medications   -when simvastatin runs out, start atorvastatin instead for cholesterol control  -use nystatin powder under the breasts as needed for rash  -see podiatrist for foot pain and dermatologist for rashes/itching

## 2020-12-03 NOTE — PROGRESS NOTES
HPI:   Berta Mosley is a 82year old female who presents for recheck of her diabetes. Patient’s FBS have been controlled. Last visit with ophthalmologist was 3 yrs ago. Pt has been checking her feet on a regular basis.  Pt reports tingling of th gabapentin 300 MG Oral Cap Take 1 capsule (300 mg total) by mouth 3 (three) times daily. 90 capsule 0   • HYDROCODONE-ACETAMINOPHEN  MG Oral Tab Take 1 tablet by mouth every 8 (eight) hours as needed for Pain.  90 tablet 0   • Levothyroxine Sodium 125 7/10/2014    Performed by Aida Moore MD at [facility] MAIN OR   • LUMBAR FACET INJECTION BILATERAL Bilateral 9/26/2013    Performed by Aida Moore MD at [facility] MAIN OR   • LUMPECTOMY LEFT     • OTHER      bladder lift   • OTHER      varicose vein surgery weakness.      EXAM:   /80   Pulse 70   Temp 98.4 °F (36.9 °C) (Oral)   Resp 16   Ht 5' 2\" (1.575 m)   Wt 164 lb (74.4 kg)   SpO2 97%   BMI 30.00 kg/m²   GENERAL: well developed, well nourished,in no apparent distress  SKIN: no rashes,no suspicious l plan.  Return in about 3 months (around 3/1/2021) for follow-up.

## 2020-12-31 ENCOUNTER — OFFICE VISIT (OUTPATIENT)
Dept: PAIN CLINIC | Facility: CLINIC | Age: 85
End: 2020-12-31
Payer: MEDICARE

## 2020-12-31 VITALS
SYSTOLIC BLOOD PRESSURE: 160 MMHG | WEIGHT: 163 LBS | HEART RATE: 76 BPM | DIASTOLIC BLOOD PRESSURE: 80 MMHG | BODY MASS INDEX: 30 KG/M2 | RESPIRATION RATE: 18 BRPM

## 2020-12-31 DIAGNOSIS — M54.16 LUMBAR RADICULOPATHY: ICD-10-CM

## 2020-12-31 DIAGNOSIS — M47.819 ARTHROPATHY OF FACET JOINT: ICD-10-CM

## 2020-12-31 DIAGNOSIS — M43.07 LUMBOSACRAL SPONDYLOLYSIS: ICD-10-CM

## 2020-12-31 PROCEDURE — 3079F DIAST BP 80-89 MM HG: CPT | Performed by: ANESTHESIOLOGY

## 2020-12-31 PROCEDURE — 99214 OFFICE O/P EST MOD 30 MIN: CPT | Performed by: ANESTHESIOLOGY

## 2020-12-31 PROCEDURE — 3077F SYST BP >= 140 MM HG: CPT | Performed by: ANESTHESIOLOGY

## 2020-12-31 RX ORDER — GABAPENTIN 300 MG/1
300 CAPSULE ORAL 3 TIMES DAILY
Qty: 90 CAPSULE | Refills: 0 | Status: SHIPPED | OUTPATIENT
Start: 2020-12-31 | End: 2021-02-05

## 2020-12-31 RX ORDER — HYDROCODONE BITARTRATE AND ACETAMINOPHEN 10; 325 MG/1; MG/1
1 TABLET ORAL EVERY 8 HOURS PRN
Qty: 90 TABLET | Refills: 0 | Status: SHIPPED | OUTPATIENT
Start: 2020-12-31 | End: 2021-02-05

## 2020-12-31 NOTE — PROGRESS NOTES
Patient presents in office today with reported pain in low back radiating down both legs,neck pain radiates to shoulders    Current pain level reported = 6/10 back, 9/10    Last reported dose of 9:30 am

## 2020-12-31 NOTE — PROGRESS NOTES
Name: Katherine Villanueva   : 1935   DOS: 2020      Pain Clinic Follow Up Visit:   Katherine Villanueva is a 82year old female who presents for recheck of her chronic low back pain.  She is status post bilateral diagnostic lumbar facet joint injec 80 MG Oral Tab Take 1 tablet (80 mg total) by mouth daily. 90 tablet 1   • tiZANidine HCl 2 MG Oral Tab Take one to two tablets every 8 hours if needed for muscle spasm.  120 tablet 0   • Amitriptyline HCl 25 MG Oral Tab Take 1 tablet (25 mg total) by mouth mouth every 8 (eight) hours as needed for Pain. • gabapentin 300 MG Oral Cap 90 capsule 0     Sig: Take 1 capsule (300 mg total) by mouth 3 (three) times daily.        Radiology orders and consultations:None    The patient indicates understanding of these

## 2021-01-02 ENCOUNTER — MOBILE ENCOUNTER (OUTPATIENT)
Dept: INTERNAL MEDICINE CLINIC | Facility: CLINIC | Age: 86
End: 2021-01-02

## 2021-01-02 NOTE — PROGRESS NOTES
Patient paged. Discussed with daughter. Patient having muscle pain and spasms between her shoulder blades. She does report one day of fever. This is subjective. She has no respiratory symptoms. She denies any URI symptoms.  She is currently on chronic Norco

## 2021-01-02 NOTE — PROGRESS NOTES
On further review of patient’s chart patient is noted to be on tizanidine 2 mg TID.  Instructions to increase this to 4 mg TID for the next three days given to patient’s daughter they are in agreement and understand

## 2021-01-04 DIAGNOSIS — I10 ESSENTIAL HYPERTENSION: ICD-10-CM

## 2021-01-04 DIAGNOSIS — J45.30 MILD PERSISTENT ASTHMA WITHOUT COMPLICATION: ICD-10-CM

## 2021-01-04 RX ORDER — SIMVASTATIN 10 MG
TABLET ORAL
Qty: 90 TABLET | Refills: 0 | Status: SHIPPED | OUTPATIENT
Start: 2021-01-04 | End: 2021-03-19

## 2021-01-04 RX ORDER — LEVOTHYROXINE SODIUM 0.12 MG/1
TABLET ORAL
Qty: 90 TABLET | Refills: 0 | Status: SHIPPED | OUTPATIENT
Start: 2021-01-04 | End: 2021-03-20

## 2021-01-04 RX ORDER — VALSARTAN 80 MG/1
TABLET ORAL
Qty: 90 TABLET | Refills: 0 | Status: SHIPPED | OUTPATIENT
Start: 2021-01-04 | End: 2021-03-20

## 2021-01-04 RX ORDER — MONTELUKAST SODIUM 10 MG/1
TABLET ORAL
Qty: 90 TABLET | Refills: 0 | Status: SHIPPED | OUTPATIENT
Start: 2021-01-04 | End: 2021-03-20

## 2021-01-21 NOTE — TELEPHONE ENCOUNTER
Medication: HYDROcodone-acetaminophen  MG Oral Tab    Date of last refill: 1/7/2019  [place] 8/1/7688   verified on site    Medication: gabapentin 300 MG Oral Cap    Date of last refill: 12/28/18  Date last fille    Risks and benefits of the medications were discussed with patient today.  Patient should contact our office if this medication is not sufficient to help manage pain.      Continue follow-up with Dr. Army Peter and neurology as recommended     Continue fo N/A

## 2021-02-02 DIAGNOSIS — Z23 NEED FOR VACCINATION: ICD-10-CM

## 2021-02-03 DIAGNOSIS — M43.07 LUMBOSACRAL SPONDYLOLYSIS: ICD-10-CM

## 2021-02-03 DIAGNOSIS — M54.16 LUMBAR RADICULOPATHY: ICD-10-CM

## 2021-02-03 DIAGNOSIS — M47.819 ARTHROPATHY OF FACET JOINT: ICD-10-CM

## 2021-02-04 NOTE — TELEPHONE ENCOUNTER
Pt's daughter calling to refill two scripts; Gabapentin and Hydrocodone. Patient informed of 48 hour refill policy excluding weekends and holidays. Informed patient only patient can  the prescription effective April 1, 2017.   Further explained p

## 2021-02-04 NOTE — TELEPHONE ENCOUNTER
Medication: gabapentin 300 MG Oral Cap    Date of last refill: 12/31/2020  [place]        Medication: HYDROcodone-acetaminophen  MG Oral Tab    Date of last refill: 12/31/2020  [place]

## 2021-02-05 RX ORDER — GABAPENTIN 300 MG/1
CAPSULE ORAL
Qty: 90 CAPSULE | Refills: 0 | Status: SHIPPED | OUTPATIENT
Start: 2021-02-05 | End: 2021-02-24

## 2021-02-05 RX ORDER — HYDROCODONE BITARTRATE AND ACETAMINOPHEN 10; 325 MG/1; MG/1
TABLET ORAL
Qty: 90 TABLET | Refills: 0 | Status: SHIPPED | OUTPATIENT
Start: 2021-02-05 | End: 2021-02-24

## 2021-02-24 DIAGNOSIS — M54.16 LUMBAR RADICULOPATHY: ICD-10-CM

## 2021-02-24 DIAGNOSIS — M43.07 LUMBOSACRAL SPONDYLOLYSIS: ICD-10-CM

## 2021-02-24 DIAGNOSIS — M47.819 ARTHROPATHY OF FACET JOINT: ICD-10-CM

## 2021-02-24 RX ORDER — HYDROCODONE BITARTRATE AND ACETAMINOPHEN 10; 325 MG/1; MG/1
1 TABLET ORAL EVERY 8 HOURS PRN
Qty: 90 TABLET | Refills: 0 | Status: SHIPPED | OUTPATIENT
Start: 2021-02-24 | End: 2021-03-30

## 2021-02-24 RX ORDER — GABAPENTIN 300 MG/1
300 CAPSULE ORAL 3 TIMES DAILY
Qty: 90 CAPSULE | Refills: 0 | Status: SHIPPED | OUTPATIENT
Start: 2021-02-24 | End: 2021-03-30

## 2021-02-24 NOTE — PATIENT INSTRUCTIONS
Refill policies:    • Allow 2-3 business days for refills; controlled substances may take longer.   • Contact your pharmacy at least 5 days prior to running out of medication and have them send an electronic request or submit request through the [facility] have a procedure or additional testing performed. [facility]) will contact your insurance carrier to obtain pre-certification or prior authorization.     Unfortunately, JEANNE has seen an increase in denial of payment even though the p normal...

## 2021-02-24 NOTE — TELEPHONE ENCOUNTER
Justine calling to fill two scripts for pt; Norco and Gabapentin. [facility]. Patient informed of 48 hour refill policy excluding weekends and holidays. Informed patient only patient can  the prescription effective April 1, 2017.   Francisco Zamudio

## 2021-02-24 NOTE — TELEPHONE ENCOUNTER
Medication: GABAPENTIN 300 MG Oral Cap    Date of last refill: 02/05/2021  [place]    Medication: HYDROCODONE-ACETAMINOPHEN  MG Oral Tab    Date of last refill: 02/05/2021  [place]

## 2021-03-12 ENCOUNTER — PATIENT OUTREACH (OUTPATIENT)
Dept: INTERNAL MEDICINE CLINIC | Facility: CLINIC | Age: 86
End: 2021-03-12

## 2021-03-12 DIAGNOSIS — L91.8 INFLAMED ACROCHORDON: Primary | ICD-10-CM

## 2021-03-12 DIAGNOSIS — G89.29 CHRONIC FOOT PAIN, UNSPECIFIED LATERALITY: ICD-10-CM

## 2021-03-12 DIAGNOSIS — M79.673 CHRONIC FOOT PAIN, UNSPECIFIED LATERALITY: ICD-10-CM

## 2021-03-12 RX ORDER — TIZANIDINE 2 MG/1
TABLET ORAL
Qty: 120 TABLET | Refills: 0 | OUTPATIENT
Start: 2021-03-12

## 2021-03-12 NOTE — TELEPHONE ENCOUNTER
Patient requesting refill of:  tiZANidine HCl 2 MG Oral Tab    To be sent to:  [address] [place] [address] [place] [address] [address] [facility], [phone], [phone]    Patient had appointment today at 10 am but cancelled due to not f

## 2021-03-12 NOTE — PROGRESS NOTES
Due for HRA in June. Outreach # 1 today via phone. D/w pt she will call back to schedule as she no longer drives.

## 2021-03-19 ENCOUNTER — OFFICE VISIT (OUTPATIENT)
Dept: INTERNAL MEDICINE CLINIC | Facility: CLINIC | Age: 86
End: 2021-03-19
Payer: MEDICARE

## 2021-03-19 VITALS
SYSTOLIC BLOOD PRESSURE: 142 MMHG | HEART RATE: 70 BPM | OXYGEN SATURATION: 98 % | BODY MASS INDEX: 29.81 KG/M2 | RESPIRATION RATE: 18 BRPM | HEIGHT: 62 IN | DIASTOLIC BLOOD PRESSURE: 78 MMHG | TEMPERATURE: 99 F | WEIGHT: 162 LBS

## 2021-03-19 DIAGNOSIS — I10 ESSENTIAL HYPERTENSION: ICD-10-CM

## 2021-03-19 DIAGNOSIS — M47.816 LUMBAR SPONDYLOSIS: ICD-10-CM

## 2021-03-19 DIAGNOSIS — E11.9 TYPE 2 DIABETES MELLITUS WITHOUT COMPLICATION, WITHOUT LONG-TERM CURRENT USE OF INSULIN (HCC): Primary | ICD-10-CM

## 2021-03-19 DIAGNOSIS — E03.9 ACQUIRED HYPOTHYROIDISM: ICD-10-CM

## 2021-03-19 DIAGNOSIS — R23.4 CHANGES IN SKIN TEXTURE: ICD-10-CM

## 2021-03-19 DIAGNOSIS — J45.30 MILD PERSISTENT ASTHMA WITHOUT COMPLICATION: ICD-10-CM

## 2021-03-19 DIAGNOSIS — E78.2 MIXED HYPERLIPIDEMIA: ICD-10-CM

## 2021-03-19 PROCEDURE — 3008F BODY MASS INDEX DOCD: CPT | Performed by: NURSE PRACTITIONER

## 2021-03-19 PROCEDURE — 3078F DIAST BP <80 MM HG: CPT | Performed by: NURSE PRACTITIONER

## 2021-03-19 PROCEDURE — 99213 OFFICE O/P EST LOW 20 MIN: CPT | Performed by: NURSE PRACTITIONER

## 2021-03-19 PROCEDURE — 3077F SYST BP >= 140 MM HG: CPT | Performed by: NURSE PRACTITIONER

## 2021-03-19 RX ORDER — TIZANIDINE 2 MG/1
TABLET ORAL
Qty: 120 TABLET | Refills: 0 | Status: SHIPPED | OUTPATIENT
Start: 2021-03-19 | End: 2021-09-02

## 2021-03-19 NOTE — PROGRESS NOTES
HPI:   Louisa Garza is a 82year old female who presents for recheck of her diabetes. Patient’s FBS have been unknown, she does not check BS at home, last A1C 6 months ago  Last visit with ophthalmologist was >3 years ago.     Pt has been checking he mcg total) by mouth As Directed. BEFORE A MEAL 90 tablet 0   • hydrocortisone 2.5 % External Cream Apply 1 Application topically 2 (two) times daily.  1 Tube 0   • tiZANidine HCl 2 MG Oral Tab Take one to two tablets every 8 hours if needed for muscle spasm FACET INJECTION Bilateral 3/3/2020    Performed by Maryann Connelly MD at [facility] ENDOSCOPY   • LUMBAR FACET INJECTION Bilateral 7/10/2014    Performed by Maryann Connelly MD at [address] [place] [address]   • LUMBAR FACET INJECTION BILATERAL Bilateral 9/26/2013    Performed of breath with exertion  CARDIOVASCULAR: denies chest pain on exertion  GI: denies abdominal pain and denies heartburn  NEURO: denies headaches    EXAM:   /78   Pulse 70   Temp 98.6 °F (37 °C)   Resp 18   Ht 5' 2\" (1.575 m)   Wt 162 lb (73.5 kg)   S

## 2021-03-20 ENCOUNTER — IMMUNIZATION (OUTPATIENT)
Dept: LAB | Age: 86
End: 2021-03-20
Attending: HOSPITALIST
Payer: MEDICARE

## 2021-03-20 DIAGNOSIS — Z23 NEED FOR VACCINATION: Primary | ICD-10-CM

## 2021-03-20 PROCEDURE — 0001A SARSCOV2 VAC 30MCG/0.3ML IM: CPT

## 2021-03-20 RX ORDER — LEVOTHYROXINE SODIUM 0.12 MG/1
125 TABLET ORAL AS DIRECTED
Qty: 90 TABLET | Refills: 0 | Status: SHIPPED | OUTPATIENT
Start: 2021-03-20 | End: 2021-06-02

## 2021-03-20 RX ORDER — VALSARTAN 80 MG/1
80 TABLET ORAL DAILY
Qty: 90 TABLET | Refills: 0 | Status: SHIPPED | OUTPATIENT
Start: 2021-03-20 | End: 2021-06-02

## 2021-03-20 RX ORDER — MONTELUKAST SODIUM 10 MG/1
10 TABLET ORAL DAILY
Qty: 90 TABLET | Refills: 0 | Status: SHIPPED | OUTPATIENT
Start: 2021-03-20 | End: 2021-06-02

## 2021-03-24 DIAGNOSIS — E78.2 MIXED HYPERLIPIDEMIA: ICD-10-CM

## 2021-03-24 DIAGNOSIS — E11.29 TYPE 2 DIABETES MELLITUS WITH MICROALBUMINURIA, WITHOUT LONG-TERM CURRENT USE OF INSULIN (HCC): ICD-10-CM

## 2021-03-24 DIAGNOSIS — R80.9 TYPE 2 DIABETES MELLITUS WITH MICROALBUMINURIA, WITHOUT LONG-TERM CURRENT USE OF INSULIN (HCC): ICD-10-CM

## 2021-03-24 RX ORDER — ATORVASTATIN CALCIUM 40 MG/1
TABLET, FILM COATED ORAL
Qty: 90 TABLET | Refills: 0 | OUTPATIENT
Start: 2021-03-24

## 2021-03-29 DIAGNOSIS — M47.819 ARTHROPATHY OF FACET JOINT: ICD-10-CM

## 2021-03-29 DIAGNOSIS — M54.16 LUMBAR RADICULOPATHY: ICD-10-CM

## 2021-03-29 DIAGNOSIS — M43.07 LUMBOSACRAL SPONDYLOLYSIS: ICD-10-CM

## 2021-03-29 NOTE — TELEPHONE ENCOUNTER
Pt's daughter requesting mother's refill for two scripts; Hydrocodone and Gabapentin. Verified pharmacy. Patient informed of 48 hour refill policy excluding weekends and holidays. Informed patient prescription is sent directly to pharmacy.   Further exp

## 2021-03-30 RX ORDER — GABAPENTIN 300 MG/1
300 CAPSULE ORAL 3 TIMES DAILY
Qty: 90 CAPSULE | Refills: 0 | Status: SHIPPED | OUTPATIENT
Start: 2021-03-30 | End: 2021-04-28

## 2021-03-30 RX ORDER — HYDROCODONE BITARTRATE AND ACETAMINOPHEN 10; 325 MG/1; MG/1
1 TABLET ORAL EVERY 8 HOURS PRN
Qty: 90 TABLET | Refills: 0 | Status: SHIPPED | OUTPATIENT
Start: 2021-03-30 | End: 2021-04-28

## 2021-03-30 NOTE — TELEPHONE ENCOUNTER
Medication: HYDROcodone-acetaminophen  MG Oral Tab    Date of last refill: 2/24/2021  Date last filled per [facility]: 3/3/2021    Medication: gabapentin 300 MG Oral Cap    Date of last refill: 2/24/2021    Last office visit: 12/31/2020  Due back t

## 2021-04-10 ENCOUNTER — IMMUNIZATION (OUTPATIENT)
Dept: LAB | Age: 86
End: 2021-04-10
Attending: HOSPITALIST
Payer: MEDICARE

## 2021-04-10 DIAGNOSIS — Z23 NEED FOR VACCINATION: Primary | ICD-10-CM

## 2021-04-10 PROCEDURE — 0002A SARSCOV2 VAC 30MCG/0.3ML IM: CPT

## 2021-04-26 ENCOUNTER — TELEPHONE (OUTPATIENT)
Dept: PAIN CLINIC | Facility: CLINIC | Age: 86
End: 2021-04-26

## 2021-04-26 DIAGNOSIS — M47.819 ARTHROPATHY OF FACET JOINT: ICD-10-CM

## 2021-04-26 DIAGNOSIS — M54.16 LUMBAR RADICULOPATHY: ICD-10-CM

## 2021-04-26 DIAGNOSIS — M43.07 LUMBOSACRAL SPONDYLOLYSIS: ICD-10-CM

## 2021-04-26 NOTE — TELEPHONE ENCOUNTER
Patient calling to request refill of [place], Gabapentin  [facility]    Patient informed of 48 hour refill policy excluding weekends and holidays. Informed patient prescription is sent directly to pharmacy.     Further explained patient will not

## 2021-04-28 RX ORDER — HYDROCODONE BITARTRATE AND ACETAMINOPHEN 10; 325 MG/1; MG/1
1 TABLET ORAL EVERY 8 HOURS PRN
Qty: 90 TABLET | Refills: 0 | Status: SHIPPED | OUTPATIENT
Start: 2021-04-28 | End: 2021-06-01

## 2021-04-28 RX ORDER — GABAPENTIN 300 MG/1
300 CAPSULE ORAL 3 TIMES DAILY
Qty: 90 CAPSULE | Refills: 0 | Status: SHIPPED | OUTPATIENT
Start: 2021-04-28 | End: 2021-05-04

## 2021-04-28 NOTE — TELEPHONE ENCOUNTER
Medication: gabapentin 300 MG Oral Cap    Date of last refill: 03/30/21          Medication: HYDROcodone-acetaminophen      Date of last refill: 04/01/21  [place] 63/40/79    Last office visit: 12/31/21  Due back t

## 2021-05-03 ENCOUNTER — MED REC SCAN ONLY (OUTPATIENT)
Dept: PAIN CLINIC | Facility: CLINIC | Age: 86
End: 2021-05-03

## 2021-05-03 ENCOUNTER — OFFICE VISIT (OUTPATIENT)
Dept: PAIN CLINIC | Facility: CLINIC | Age: 86
End: 2021-05-03
Payer: MEDICARE

## 2021-05-03 ENCOUNTER — LAB ENCOUNTER (OUTPATIENT)
Dept: LAB | Age: 86
End: 2021-05-03
Attending: NURSE PRACTITIONER
Payer: MEDICARE

## 2021-05-03 VITALS
DIASTOLIC BLOOD PRESSURE: 80 MMHG | WEIGHT: 167 LBS | BODY MASS INDEX: 31 KG/M2 | HEART RATE: 76 BPM | OXYGEN SATURATION: 98 % | SYSTOLIC BLOOD PRESSURE: 140 MMHG

## 2021-05-03 DIAGNOSIS — M47.819 ARTHROPATHY OF FACET JOINT: ICD-10-CM

## 2021-05-03 DIAGNOSIS — M54.12 CERVICAL RADICULAR PAIN: ICD-10-CM

## 2021-05-03 DIAGNOSIS — M43.07 LUMBOSACRAL SPONDYLOLYSIS: ICD-10-CM

## 2021-05-03 DIAGNOSIS — M54.16 LUMBAR RADICULOPATHY: ICD-10-CM

## 2021-05-03 DIAGNOSIS — Z79.891 LONG TERM (CURRENT) USE OF OPIATE ANALGESIC: ICD-10-CM

## 2021-05-03 DIAGNOSIS — M43.07 LUMBOSACRAL SPONDYLOLYSIS: Primary | ICD-10-CM

## 2021-05-03 PROBLEM — K59.00 CONSTIPATION: Status: ACTIVE | Noted: 2021-05-03

## 2021-05-03 PROCEDURE — 80307 DRUG TEST PRSMV CHEM ANLYZR: CPT

## 2021-05-03 PROCEDURE — 3077F SYST BP >= 140 MM HG: CPT | Performed by: NURSE PRACTITIONER

## 2021-05-03 PROCEDURE — 3079F DIAST BP 80-89 MM HG: CPT | Performed by: NURSE PRACTITIONER

## 2021-05-03 PROCEDURE — 99214 OFFICE O/P EST MOD 30 MIN: CPT | Performed by: NURSE PRACTITIONER

## 2021-05-03 NOTE — PROGRESS NOTES
HPI:   Taisha Anderson presents for medication follow up/she has chronic low back pain and had bilat facet injx in 3/3/20 without relief.   She has continued on pian meds/using norco 10/325 TID prn for pain/also uses gabapentin 300mg tid and tizandine 4mg patient’s pain: medications, changing position    Functional Assessment: Patient reports that they are able to complete all of their ADL's such as eating, bathing, using the toilet, dressing and getting up from a bed or a chair independently.     Current Me Constipation    Relief obtained from medication management: good    Patient requires assistance with: driving    Have you recently had any feelings of harming yourself or others? The patient's response was no.   Patient is currently on pain medications:  Esme Nichols stroke     Long term (current) use of opiate analgesic     Cervical radicular pain     Constipation      Medical Decision Making:   Diagnosis:    Lumbosacral spondylolysis  (primary encounter diagnosis)  Long term (current) use of opiate analgesic  Cervica

## 2021-05-03 NOTE — PATIENT INSTRUCTIONS
Refill policies:    • Allow 2-3 business days for refills; controlled substances may take longer.   • Contact your pharmacy at least 5 days prior to running out of medication and have them send an electronic request or submit request through the “request re Depending on your insurance carrier, approval may take 3-10 days. It is highly recommended patients contact their insurance carrier directly to determine coverage.   If test is done without insurance authorization, patient may be responsible for the entire medications as needed norco   >have the cervical spine MRI to evaluate neck and left upper extremity pain  >use over the counter simethicone/gas x for gas  >follow up with primary care for left front of chest wall discomfort if symptoms persist/pain was re

## 2021-05-03 NOTE — PROGRESS NOTES
Patient presents in office today with reported pain in low back, left leg, left shoulder    Current pain level reported = 7/10    Last reported dose of Norco at 6 AM, and gabapentin at 7AM

## 2021-05-04 RX ORDER — GABAPENTIN 300 MG/1
CAPSULE ORAL
Qty: 90 CAPSULE | Refills: 0 | Status: SHIPPED | OUTPATIENT
Start: 2021-05-04 | End: 2021-06-01

## 2021-05-04 RX ORDER — HYDROCODONE BITARTRATE AND ACETAMINOPHEN 10; 325 MG/1; MG/1
TABLET ORAL
Qty: 90 TABLET | Refills: 0 | Status: SHIPPED | OUTPATIENT
Start: 2021-05-04 | End: 2021-06-01

## 2021-05-04 NOTE — TELEPHONE ENCOUNTER
Pt's daughter states the pharmacy does not have either rx. I see receipt confirmed. Please check with pharmacy.

## 2021-05-04 NOTE — TELEPHONE ENCOUNTER
Medications have been resent.       HYDROCODONE-ACETAMINOPHEN  MG Oral Tab 90 tablet 0 5/4/2021     Sig: TAKE 1 TABLET BY MOUTH EVERY 8HOUR AS NEEDED FOR PAIN    Sent to pharmacy as: HYDROcodone-Acetaminophen  MG Oral Tablet ([address])    Earliest

## 2021-05-04 NOTE — TELEPHONE ENCOUNTER
Received a call from Shanelle Villanueva, pharmacist @ [facility] pharmacy. Shanelle Villanueva stated pharmacy did not receive scripts sent for Norco and Gabapentin on 4/28/2021 and is requesting to resend scripts to the pharmacy today.      Informed Shanelle Villanueva I will have provider approve s

## 2021-06-01 DIAGNOSIS — M47.819 ARTHROPATHY OF FACET JOINT: ICD-10-CM

## 2021-06-01 DIAGNOSIS — M43.07 LUMBOSACRAL SPONDYLOLYSIS: ICD-10-CM

## 2021-06-01 DIAGNOSIS — M54.16 LUMBAR RADICULOPATHY: ICD-10-CM

## 2021-06-01 RX ORDER — HYDROCODONE BITARTRATE AND ACETAMINOPHEN 10; 325 MG/1; MG/1
1 TABLET ORAL EVERY 8 HOURS PRN
Qty: 90 TABLET | Refills: 0 | Status: SHIPPED | OUTPATIENT
Start: 2021-06-01 | End: 2021-07-02

## 2021-06-01 RX ORDER — GABAPENTIN 300 MG/1
300 CAPSULE ORAL 3 TIMES DAILY
Qty: 90 CAPSULE | Refills: 0 | Status: SHIPPED | OUTPATIENT
Start: 2021-06-01 | End: 2021-07-02

## 2021-06-01 NOTE — TELEPHONE ENCOUNTER
Medication: GABAPENTIN 300 MG Oral Cap    Date of last refill: 05/04/21        Medication: HYDROCODONE-ACETAMINOPHEN  MG Oral Tab    Date of last refill: 05/04/21  [place] 80/17/15    Last office visit: 05/03/21  D

## 2021-06-01 NOTE — TELEPHONE ENCOUNTER
Patient calling to request refill of [place] and Gabapentin  [facility]    Patient informed of 48 hour refill policy excluding weekends and holidays. Informed patient prescription is sent directly to pharmacy.     Further explained patient will not r

## 2021-06-02 DIAGNOSIS — E03.9 ACQUIRED HYPOTHYROIDISM: ICD-10-CM

## 2021-06-02 DIAGNOSIS — I10 ESSENTIAL HYPERTENSION: ICD-10-CM

## 2021-06-02 DIAGNOSIS — J45.30 MILD PERSISTENT ASTHMA WITHOUT COMPLICATION: ICD-10-CM

## 2021-06-02 RX ORDER — VALSARTAN 80 MG/1
TABLET ORAL
Qty: 90 TABLET | Refills: 0 | Status: SHIPPED | OUTPATIENT
Start: 2021-06-02 | End: 2021-08-04

## 2021-06-02 RX ORDER — MONTELUKAST SODIUM 10 MG/1
TABLET ORAL
Qty: 90 TABLET | Refills: 0 | Status: SHIPPED | OUTPATIENT
Start: 2021-06-02 | End: 2021-08-27

## 2021-06-02 RX ORDER — LEVOTHYROXINE SODIUM 0.12 MG/1
TABLET ORAL
Qty: 90 TABLET | Refills: 0 | Status: SHIPPED | OUTPATIENT
Start: 2021-06-02 | End: 2021-08-27

## 2021-06-11 ENCOUNTER — TELEPHONE (OUTPATIENT)
Dept: INTERNAL MEDICINE CLINIC | Facility: CLINIC | Age: 86
End: 2021-06-11

## 2021-06-11 NOTE — TELEPHONE ENCOUNTER
MARK to [facility]    Spoke to patient's EC, Justine -   Wanting to see Dr. Andreina Quintero for a rash. \"Patient thinks she has bugs on her skin. \"  \"Patient knows she has MRSA. \" Patient doesn't have MRSA. \"     \"It just looks like she has scratched herself but doesn't

## 2021-06-11 NOTE — TELEPHONE ENCOUNTER
Moreno Muñoz, daughter, called and informed patient is having a rash on her arms and chest. Please advise.

## 2021-06-16 ENCOUNTER — TELEPHONE (OUTPATIENT)
Dept: INTERNAL MEDICINE CLINIC | Facility: CLINIC | Age: 86
End: 2021-06-16

## 2021-06-16 DIAGNOSIS — M47.816 LUMBAR SPONDYLOSIS: Primary | ICD-10-CM

## 2021-06-16 DIAGNOSIS — M54.12 CERVICAL RADICULAR PAIN: ICD-10-CM

## 2021-06-16 DIAGNOSIS — Z79.891 LONG TERM (CURRENT) USE OF OPIATE ANALGESIC: ICD-10-CM

## 2021-06-16 NOTE — TELEPHONE ENCOUNTER
Patient's daughter, Nati Rodríguez, called and asked for a backdated referral to Dr Zechariah Hebert - patient was seen on 05/03/21 and the visit was denied due to no authorization on file. [facility]

## 2021-07-01 DIAGNOSIS — M47.819 ARTHROPATHY OF FACET JOINT: ICD-10-CM

## 2021-07-01 DIAGNOSIS — M54.16 LUMBAR RADICULOPATHY: ICD-10-CM

## 2021-07-01 DIAGNOSIS — M43.07 LUMBOSACRAL SPONDYLOLYSIS: ICD-10-CM

## 2021-07-01 NOTE — TELEPHONE ENCOUNTER
Patient calling to request refill of:  gabapentin 300 MG Oral Cap 90 capsule     HYDROcodone-acetaminophen  MG Oral Tab 90 tablet     Pharmacy:  [address] [place] [address] W [phone], [phone]    Patient informed of 48 hour refill policy excluding weekends and holidays. Informed patient prescription is sent directly to pharmacy. Further explained patient will not receive a call back once prescription is ready. *Patient's daughter Vinayak Mensah stated that she is aware of 26EG refill policy however is asking if medication can be sent sooner as she forgot to call yesterday and her mother is out of medication. Advised patient's daughter I cannot guarantee that medication can be sent earlier than typical 73ZF policy. Sathishrosalba Makenna verbalized understanding and requesting call back from RN once sent.

## 2021-07-02 RX ORDER — GABAPENTIN 300 MG/1
300 CAPSULE ORAL 3 TIMES DAILY
Qty: 90 CAPSULE | Refills: 0 | Status: SHIPPED | OUTPATIENT
Start: 2021-07-02 | End: 2021-08-03

## 2021-07-02 RX ORDER — HYDROCODONE BITARTRATE AND ACETAMINOPHEN 10; 325 MG/1; MG/1
1 TABLET ORAL EVERY 8 HOURS PRN
Qty: 90 TABLET | Refills: 0 | Status: SHIPPED | OUTPATIENT
Start: 2021-07-02 | End: 2021-08-03

## 2021-07-30 ENCOUNTER — LAB ENCOUNTER (OUTPATIENT)
Dept: LAB | Age: 86
End: 2021-07-30
Attending: NURSE PRACTITIONER
Payer: MEDICARE

## 2021-07-30 ENCOUNTER — OFFICE VISIT (OUTPATIENT)
Dept: INTERNAL MEDICINE CLINIC | Facility: CLINIC | Age: 86
End: 2021-07-30
Payer: MEDICARE

## 2021-07-30 VITALS
TEMPERATURE: 99 F | SYSTOLIC BLOOD PRESSURE: 136 MMHG | OXYGEN SATURATION: 98 % | RESPIRATION RATE: 18 BRPM | HEART RATE: 78 BPM | HEIGHT: 62 IN | WEIGHT: 165 LBS | BODY MASS INDEX: 30.36 KG/M2 | DIASTOLIC BLOOD PRESSURE: 62 MMHG

## 2021-07-30 DIAGNOSIS — R80.9 TYPE 2 DIABETES MELLITUS WITH MICROALBUMINURIA, WITHOUT LONG-TERM CURRENT USE OF INSULIN (HCC): ICD-10-CM

## 2021-07-30 DIAGNOSIS — T40.605S: Primary | ICD-10-CM

## 2021-07-30 DIAGNOSIS — E11.29 TYPE 2 DIABETES MELLITUS WITH MICROALBUMINURIA, WITHOUT LONG-TERM CURRENT USE OF INSULIN (HCC): ICD-10-CM

## 2021-07-30 DIAGNOSIS — E11.9 TYPE 2 DIABETES MELLITUS WITHOUT COMPLICATION, WITHOUT LONG-TERM CURRENT USE OF INSULIN (HCC): ICD-10-CM

## 2021-07-30 DIAGNOSIS — E78.2 MIXED HYPERLIPIDEMIA: ICD-10-CM

## 2021-07-30 DIAGNOSIS — I10 ESSENTIAL HYPERTENSION: ICD-10-CM

## 2021-07-30 DIAGNOSIS — E03.9 ACQUIRED HYPOTHYROIDISM: ICD-10-CM

## 2021-07-30 DIAGNOSIS — R20.9 SKIN SENSATION DISTURBANCE: ICD-10-CM

## 2021-07-30 LAB
ALBUMIN SERPL-MCNC: 4.1 G/DL (ref 3.4–5)
ALBUMIN/GLOB SERPL: 1.3 {RATIO} (ref 1–2)
ALP LIVER SERPL-CCNC: 80 U/L
ALT SERPL-CCNC: 24 U/L
ANION GAP SERPL CALC-SCNC: 6 MMOL/L (ref 0–18)
AST SERPL-CCNC: 17 U/L (ref 15–37)
BASOPHILS # BLD AUTO: 0.06 X10(3) UL (ref 0–0.2)
BASOPHILS NFR BLD AUTO: 1.2 %
BILIRUB SERPL-MCNC: 0.4 MG/DL (ref 0.1–2)
BILIRUB UR QL STRIP.AUTO: NEGATIVE
BUN BLD-MCNC: 17 MG/DL (ref 7–18)
CALCIUM BLD-MCNC: 9.2 MG/DL (ref 8.5–10.1)
CHLORIDE SERPL-SCNC: 107 MMOL/L (ref 98–112)
CHOLEST SMN-MCNC: 106 MG/DL (ref ?–200)
CLARITY UR REFRACT.AUTO: CLEAR
CO2 SERPL-SCNC: 28 MMOL/L (ref 21–32)
COLOR UR AUTO: YELLOW
CREAT BLD-MCNC: 0.59 MG/DL
CREAT UR-SCNC: 64.3 MG/DL
EOSINOPHIL # BLD AUTO: 0.17 X10(3) UL (ref 0–0.7)
EOSINOPHIL NFR BLD AUTO: 3.4 %
ERYTHROCYTE [DISTWIDTH] IN BLOOD BY AUTOMATED COUNT: 12.6 %
EST. AVERAGE GLUCOSE BLD GHB EST-MCNC: 146 MG/DL (ref 68–126)
GLOBULIN PLAS-MCNC: 3.1 G/DL (ref 2.8–4.4)
GLUCOSE BLD-MCNC: 100 MG/DL (ref 70–99)
GLUCOSE UR STRIP.AUTO-MCNC: NEGATIVE MG/DL
HBA1C MFR BLD HPLC: 6.7 % (ref ?–5.7)
HCT VFR BLD AUTO: 37.7 %
HDLC SERPL-MCNC: 49 MG/DL (ref 40–59)
HGB BLD-MCNC: 12 G/DL
IMM GRANULOCYTES # BLD AUTO: 0.01 X10(3) UL (ref 0–1)
IMM GRANULOCYTES NFR BLD: 0.2 %
KETONES UR STRIP.AUTO-MCNC: NEGATIVE MG/DL
LDLC SERPL CALC-MCNC: 35 MG/DL (ref ?–100)
LEUKOCYTE ESTERASE UR QL STRIP.AUTO: NEGATIVE
LYMPHOCYTES # BLD AUTO: 1.73 X10(3) UL (ref 1–4)
LYMPHOCYTES NFR BLD AUTO: 34.2 %
M PROTEIN MFR SERPL ELPH: 7.2 G/DL (ref 6.4–8.2)
MCH RBC QN AUTO: 28.5 PG (ref 26–34)
MCHC RBC AUTO-ENTMCNC: 31.8 G/DL (ref 31–37)
MCV RBC AUTO: 89.5 FL
MICROALBUMIN UR-MCNC: 12.2 MG/DL
MICROALBUMIN/CREAT 24H UR-RTO: 189.7 UG/MG (ref ?–30)
MONOCYTES # BLD AUTO: 0.5 X10(3) UL (ref 0.1–1)
MONOCYTES NFR BLD AUTO: 9.9 %
NEUTROPHILS # BLD AUTO: 2.59 X10 (3) UL (ref 1.5–7.7)
NEUTROPHILS # BLD AUTO: 2.59 X10(3) UL (ref 1.5–7.7)
NEUTROPHILS NFR BLD AUTO: 51.1 %
NITRITE UR QL STRIP.AUTO: NEGATIVE
NONHDLC SERPL-MCNC: 57 MG/DL (ref ?–130)
OSMOLALITY SERPL CALC.SUM OF ELEC: 294 MOSM/KG (ref 275–295)
PATIENT FASTING Y/N/NP: YES
PATIENT FASTING Y/N/NP: YES
PH UR STRIP.AUTO: 5 [PH] (ref 5–8)
PLATELET # BLD AUTO: 201 10(3)UL (ref 150–450)
POTASSIUM SERPL-SCNC: 3.6 MMOL/L (ref 3.5–5.1)
PROT UR STRIP.AUTO-MCNC: NEGATIVE MG/DL
RBC # BLD AUTO: 4.21 X10(6)UL
RBC UR QL AUTO: NEGATIVE
SODIUM SERPL-SCNC: 141 MMOL/L (ref 136–145)
SP GR UR STRIP.AUTO: 1.02 (ref 1–1.03)
TRIGL SERPL-MCNC: 126 MG/DL (ref 30–149)
TSI SER-ACNC: 0.54 MIU/ML (ref 0.36–3.74)
UROBILINOGEN UR STRIP.AUTO-MCNC: <2 MG/DL
VLDLC SERPL CALC-MCNC: 17 MG/DL (ref 0–30)
WBC # BLD AUTO: 5.1 X10(3) UL (ref 4–11)

## 2021-07-30 PROCEDURE — 3008F BODY MASS INDEX DOCD: CPT | Performed by: NURSE PRACTITIONER

## 2021-07-30 PROCEDURE — 87101 SKIN FUNGI CULTURE: CPT | Performed by: NURSE PRACTITIONER

## 2021-07-30 PROCEDURE — 87107 FUNGI IDENTIFICATION MOLD: CPT | Performed by: NURSE PRACTITIONER

## 2021-07-30 PROCEDURE — 3075F SYST BP GE 130 - 139MM HG: CPT | Performed by: NURSE PRACTITIONER

## 2021-07-30 PROCEDURE — 99213 OFFICE O/P EST LOW 20 MIN: CPT | Performed by: NURSE PRACTITIONER

## 2021-07-30 PROCEDURE — 84443 ASSAY THYROID STIM HORMONE: CPT

## 2021-07-30 PROCEDURE — 80053 COMPREHEN METABOLIC PANEL: CPT

## 2021-07-30 PROCEDURE — 85025 COMPLETE CBC W/AUTO DIFF WBC: CPT

## 2021-07-30 PROCEDURE — 81003 URINALYSIS AUTO W/O SCOPE: CPT

## 2021-07-30 PROCEDURE — 36415 COLL VENOUS BLD VENIPUNCTURE: CPT

## 2021-07-30 PROCEDURE — 83036 HEMOGLOBIN GLYCOSYLATED A1C: CPT

## 2021-07-30 PROCEDURE — 80061 LIPID PANEL: CPT

## 2021-07-30 PROCEDURE — 3078F DIAST BP <80 MM HG: CPT | Performed by: NURSE PRACTITIONER

## 2021-07-30 PROCEDURE — 82043 UR ALBUMIN QUANTITATIVE: CPT

## 2021-07-30 PROCEDURE — 82570 ASSAY OF URINE CREATININE: CPT

## 2021-07-30 RX ORDER — HYDROXYZINE HYDROCHLORIDE 25 MG/1
25 TABLET, FILM COATED ORAL 3 TIMES DAILY PRN
Qty: 30 TABLET | Refills: 0 | Status: SHIPPED | OUTPATIENT
Start: 2021-07-30

## 2021-07-30 NOTE — PROGRESS NOTES
HPI:    Patient ID: Jeanie Gomez is a 82year old female. Patient presents with:  Derm Problem      Present with daughter. Mother c/o sensation of bugs on her skin and she is pulling them out.  She brought a sample of what is coming out of her skin Jesenia Stallings MD;  Location: [facility] MAIN OR   • OTHER SURGICAL HISTORY Bilateral 3/9/2015    Procedure: SACROILIAC JOINT INJECTION BILATERAL;  Surgeon: Jesenia Stallings MD;  Location: [facility] MAIN OR   • OTHER SURGICAL HISTORY Right 9/3/2015    Procedure: Marly Veliz tablet 0   • MONTELUKAST SODIUM 10 MG Oral Tab TAKE 1 TABLET BY MOUTH DAILY 90 tablet 0   • LEVOTHYROXINE SODIUM 125 MCG Oral Tab TAKE 1 TABLET BY MOUTH IN THE MORNING BEFORE MEAL 90 tablet 0   • hydrocortisone 2.5 % External Cream Apply 1 Application topi MCH 28.8 09/01/2020    MCHC 32.2 09/01/2020    RDW 12.8 09/01/2020    .0 09/01/2020     Lab Results   Component Value Date    CHOLEST 105 09/01/2020    TRIG 127 09/01/2020    HDL 36 (L) 09/01/2020    LDL 44 09/01/2020    VLDL 25 09/01/2020    NONHDL

## 2021-08-03 DIAGNOSIS — M47.819 ARTHROPATHY OF FACET JOINT: ICD-10-CM

## 2021-08-03 DIAGNOSIS — M54.16 LUMBAR RADICULOPATHY: ICD-10-CM

## 2021-08-03 DIAGNOSIS — M43.07 LUMBOSACRAL SPONDYLOLYSIS: ICD-10-CM

## 2021-08-03 RX ORDER — GABAPENTIN 300 MG/1
300 CAPSULE ORAL 3 TIMES DAILY
Qty: 90 CAPSULE | Refills: 0 | Status: SHIPPED | OUTPATIENT
Start: 2021-08-03 | End: 2021-08-27

## 2021-08-03 RX ORDER — HYDROCODONE BITARTRATE AND ACETAMINOPHEN 10; 325 MG/1; MG/1
1 TABLET ORAL EVERY 8 HOURS PRN
Qty: 90 TABLET | Refills: 0 | Status: SHIPPED | OUTPATIENT
Start: 2021-08-03 | End: 2021-08-27

## 2021-08-03 NOTE — TELEPHONE ENCOUNTER
Medication: gabapentin 300 MG Oral Cap    Date of last refill: 07/02/21      Medication: HYDROcodone-acetaminophen  MG Oral Tab    Date of last refill: 07/02/21  [place] 96/87/70    Last office visit: 5/3/21  Due b

## 2021-08-03 NOTE — TELEPHONE ENCOUNTER
Patient calling to request refill of   HYDROcodone-acetaminophen      gabapentin 300 MG Oral [facility] [facility] [phone], [phone]  Patient informed of 48 hour refill policy excluding weekends an

## 2021-08-04 ENCOUNTER — TELEPHONE (OUTPATIENT)
Dept: INTERNAL MEDICINE CLINIC | Facility: CLINIC | Age: 86
End: 2021-08-04

## 2021-08-04 DIAGNOSIS — R80.9 TYPE 2 DIABETES MELLITUS WITH MICROALBUMINURIA, WITHOUT LONG-TERM CURRENT USE OF INSULIN (HCC): ICD-10-CM

## 2021-08-04 DIAGNOSIS — E78.2 MIXED HYPERLIPIDEMIA: ICD-10-CM

## 2021-08-04 DIAGNOSIS — I10 ESSENTIAL HYPERTENSION: ICD-10-CM

## 2021-08-04 DIAGNOSIS — L98.9 SKIN ABNORMALITIES: Primary | ICD-10-CM

## 2021-08-04 DIAGNOSIS — E11.29 TYPE 2 DIABETES MELLITUS WITH MICROALBUMINURIA, WITHOUT LONG-TERM CURRENT USE OF INSULIN (HCC): ICD-10-CM

## 2021-08-04 RX ORDER — VALSARTAN 80 MG/1
TABLET ORAL
Qty: 90 TABLET | Refills: 0 | Status: SHIPPED | OUTPATIENT
Start: 2021-08-04 | End: 2021-12-03

## 2021-08-04 RX ORDER — ATORVASTATIN CALCIUM 40 MG/1
TABLET, FILM COATED ORAL
Qty: 90 TABLET | Refills: 0 | Status: SHIPPED | OUTPATIENT
Start: 2021-08-04 | End: 2021-12-06

## 2021-08-04 NOTE — TELEPHONE ENCOUNTER
----- Message from SUZAN Arce sent at 8/4/2021  9:37 AM CDT -----  Results reviewed. Please inform patients daughter Angle Ruby of results and further assessment can take up to 30 days.  I am unsure if this is an accurate test as I dont know how long s

## 2021-08-04 NOTE — TELEPHONE ENCOUNTER
Patients EC mt  expresses understanding of lab results and processes going forward.    Referral and contact information provided to pt for Dr Emil Cohen for derm referral

## 2021-08-05 ENCOUNTER — OFFICE VISIT (OUTPATIENT)
Dept: PAIN CLINIC | Facility: CLINIC | Age: 86
End: 2021-08-05
Payer: MEDICARE

## 2021-08-05 VITALS
DIASTOLIC BLOOD PRESSURE: 70 MMHG | OXYGEN SATURATION: 96 % | HEART RATE: 42 BPM | BODY MASS INDEX: 30 KG/M2 | RESPIRATION RATE: 16 BRPM | WEIGHT: 165 LBS | SYSTOLIC BLOOD PRESSURE: 140 MMHG

## 2021-08-05 DIAGNOSIS — M47.812 ARTHROPATHY OF CERVICAL FACET JOINT: Primary | ICD-10-CM

## 2021-08-05 PROCEDURE — 3078F DIAST BP <80 MM HG: CPT | Performed by: ANESTHESIOLOGY

## 2021-08-05 PROCEDURE — 99215 OFFICE O/P EST HI 40 MIN: CPT | Performed by: ANESTHESIOLOGY

## 2021-08-05 PROCEDURE — 3077F SYST BP >= 140 MM HG: CPT | Performed by: ANESTHESIOLOGY

## 2021-08-05 NOTE — PROGRESS NOTES
Patient presents in office today with reported pain in neck radiating down left arm low back     Current pain level reported = 8/10    Last reported dose of norco today       Narcotic Contract renewal     Urine Drug screen

## 2021-08-09 NOTE — PROGRESS NOTES
Name: Ronen Singletary   : 1935   DOS: 2021      Pain Clinic Follow Up Visit:   Ronen Singletary is a 82year old female who presents for recheck of her chronic low back and neck pain.  She is status post bilateral diagnostic lumbar facet join Tab Take one to two tablets every 8 hours if needed for muscle spasm.  120 tablet 0   • Nystatin 961635 UNIT/GM External Powder Apply under both breasts once daily as needed for rash 45 g 0   • omeprazole 20 MG Oral Capsule Delayed Release      • Albuterol

## 2021-08-10 ENCOUNTER — TELEPHONE (OUTPATIENT)
Dept: NEUROLOGY | Facility: CLINIC | Age: 86
End: 2021-08-10

## 2021-08-10 NOTE — TELEPHONE ENCOUNTER
Completed [employer] Form for Left C4-C7 MBB ([id number]) attached clinical information faxed to [phone];confirmation received.

## 2021-08-11 NOTE — TELEPHONE ENCOUNTER
Received call from Mony at [facility] Medicare Guidelines only allow 2 levels of MBB.    Will move forward with only 2 Levels

## 2021-08-12 NOTE — TELEPHONE ENCOUNTER
Spoke with pts daughter Rosi Luna to advise on insurance auth and Jeffy to schedule. Rosi Luna stated she will handle the scheduling however she wants to just call her mom to make sure what days work for her.  Olaf Johnson does procedures on Mon/Wed, the

## 2021-08-12 NOTE — TELEPHONE ENCOUNTER
Prior authorization request completed for: Left C4-C5,C5-C6 MBB   -Only 2 Levels Authorized     Authorization #[id number]    Authorization dates: 08/10/21 - 09/24/21  CPT codes approved: 65802,74622  Number of visits/dates of service approved: 1  Valente

## 2021-08-12 NOTE — TELEPHONE ENCOUNTER
Question Answer Comment   Anesthesia Type Sedation    Provider [facility]    Procedure Facet    Laterality/Level Left diagnostic cervical facet joint injection at C4-C5, C5-C6 and C6-C7 level under fluoroscopy    Implants No    Medical clearance

## 2021-08-23 ENCOUNTER — MED REC SCAN ONLY (OUTPATIENT)
Dept: INTERNAL MEDICINE CLINIC | Facility: CLINIC | Age: 86
End: 2021-08-23

## 2021-08-26 DIAGNOSIS — M47.819 ARTHROPATHY OF FACET JOINT: ICD-10-CM

## 2021-08-26 DIAGNOSIS — M54.16 LUMBAR RADICULOPATHY: ICD-10-CM

## 2021-08-26 DIAGNOSIS — M43.07 LUMBOSACRAL SPONDYLOLYSIS: ICD-10-CM

## 2021-08-26 NOTE — TELEPHONE ENCOUNTER
Patient calling to request refill of   gabapentin 300 MG Oral Cap  HYDROcodone-acetaminophen  MG Oral Tab    Pharmacy     [address] [facility] [phone], [phone]  Patient informed of 48 hour refill policy excluding

## 2021-08-27 DIAGNOSIS — J45.30 MILD PERSISTENT ASTHMA WITHOUT COMPLICATION: ICD-10-CM

## 2021-08-27 DIAGNOSIS — E11.29 TYPE 2 DIABETES MELLITUS WITH MICROALBUMINURIA, WITHOUT LONG-TERM CURRENT USE OF INSULIN (HCC): ICD-10-CM

## 2021-08-27 DIAGNOSIS — E78.2 MIXED HYPERLIPIDEMIA: ICD-10-CM

## 2021-08-27 DIAGNOSIS — E03.9 ACQUIRED HYPOTHYROIDISM: ICD-10-CM

## 2021-08-27 DIAGNOSIS — R80.9 TYPE 2 DIABETES MELLITUS WITH MICROALBUMINURIA, WITHOUT LONG-TERM CURRENT USE OF INSULIN (HCC): ICD-10-CM

## 2021-08-27 RX ORDER — GABAPENTIN 300 MG/1
300 CAPSULE ORAL 3 TIMES DAILY
Qty: 90 CAPSULE | Refills: 0 | Status: SHIPPED | OUTPATIENT
Start: 2021-08-27 | End: 2021-10-05

## 2021-08-27 RX ORDER — ATORVASTATIN CALCIUM 40 MG/1
TABLET, FILM COATED ORAL
Qty: 90 TABLET | Refills: 0 | OUTPATIENT
Start: 2021-08-27

## 2021-08-27 RX ORDER — LEVOTHYROXINE SODIUM 0.12 MG/1
TABLET ORAL
Qty: 90 TABLET | Refills: 0 | Status: SHIPPED | OUTPATIENT
Start: 2021-08-27 | End: 2021-12-06

## 2021-08-27 RX ORDER — MONTELUKAST SODIUM 10 MG/1
TABLET ORAL
Qty: 90 TABLET | Refills: 0 | Status: SHIPPED | OUTPATIENT
Start: 2021-08-27 | End: 2021-12-06

## 2021-08-27 RX ORDER — HYDROCODONE BITARTRATE AND ACETAMINOPHEN 10; 325 MG/1; MG/1
1 TABLET ORAL EVERY 8 HOURS PRN
Qty: 90 TABLET | Refills: 0 | Status: SHIPPED | OUTPATIENT
Start: 2021-08-27 | End: 2021-10-05

## 2021-08-27 NOTE — TELEPHONE ENCOUNTER
Medication:gabapentin 300 MG Oral Cap    Date of last refill: 08/03/21            Medication: HYDROcodone-acetaminophen  MG Oral Tab    Date of last refill: 08/03/21  [place] 05[place]14[place]    Last office visit: 08/05/21

## 2021-09-02 DIAGNOSIS — M47.816 LUMBAR SPONDYLOSIS: ICD-10-CM

## 2021-09-02 RX ORDER — TIZANIDINE 2 MG/1
TABLET ORAL
Qty: 120 TABLET | Refills: 0 | OUTPATIENT
Start: 2021-09-02

## 2021-09-02 RX ORDER — TIZANIDINE 2 MG/1
TABLET ORAL
Qty: 120 TABLET | Refills: 0 | Status: SHIPPED | OUTPATIENT
Start: 2021-09-02 | End: 2021-11-09

## 2021-09-02 NOTE — TELEPHONE ENCOUNTER
Patient daughter states she needs refill for :    tiZANidine HCl 2 MG Oral Tab     Pharmacy has no refills left    [address], [address] [facility], [phone]

## 2021-09-20 ENCOUNTER — OFFICE VISIT (OUTPATIENT)
Dept: INTERNAL MEDICINE CLINIC | Facility: CLINIC | Age: 86
End: 2021-09-20
Payer: MEDICARE

## 2021-09-20 VITALS
DIASTOLIC BLOOD PRESSURE: 82 MMHG | HEART RATE: 70 BPM | BODY MASS INDEX: 29.08 KG/M2 | SYSTOLIC BLOOD PRESSURE: 136 MMHG | WEIGHT: 158 LBS | TEMPERATURE: 99 F | OXYGEN SATURATION: 98 % | HEIGHT: 62 IN | RESPIRATION RATE: 18 BRPM

## 2021-09-20 DIAGNOSIS — R44.2: Primary | ICD-10-CM

## 2021-09-20 PROCEDURE — 3075F SYST BP GE 130 - 139MM HG: CPT | Performed by: NURSE PRACTITIONER

## 2021-09-20 PROCEDURE — 3008F BODY MASS INDEX DOCD: CPT | Performed by: NURSE PRACTITIONER

## 2021-09-20 PROCEDURE — 99213 OFFICE O/P EST LOW 20 MIN: CPT | Performed by: NURSE PRACTITIONER

## 2021-09-20 PROCEDURE — 3079F DIAST BP 80-89 MM HG: CPT | Performed by: NURSE PRACTITIONER

## 2021-09-20 NOTE — PROGRESS NOTES
HPI:    Patient ID: Antonia Vincent is a 82year old female. Patient presents with:  Derm Problem      Presents with her daughter Cesario Weems. Was seen by derm punch biopsy negative. She continue to scratch at her skin and believes she is pulling bugs out. BILATERAL;  Surgeon: Robbie Kim MD;  Location: [address] [place] [address] OR   • OTHER SURGICAL HISTORY Right 9/3/2015    Procedure: RADIOFREQUENCY ABLATION OF THORACIC OR LUMBAR MEDIAL BRANCH;  Surgeon: Robbie Kim MD;  Location: [address] [place] [address] OR   • OTHER SURGICAL H 1 tablet (25 mg total) by mouth 3 (three) times daily as needed for Itching or Anxiety.  30 tablet 0   • Nystatin 229615 UNIT/GM External Powder Apply under both breasts once daily as needed for rash 45 g 0   • omeprazole 20 MG Oral Capsule Delayed Release for: IRON, IRONTOT  No results found for: B12, VITB12  No results found for: PHOS, PHOSPHORUS  No results found for: MG     PHYSICAL EXAM:   /82   Pulse 70   Temp 98.8 °F (37.1 °C) (Oral)   Resp 18   Ht 5' 2\" (1.575 m)   Wt 158 lb (71.7 kg)   SpO2 9

## 2021-10-05 DIAGNOSIS — M47.819 ARTHROPATHY OF FACET JOINT: ICD-10-CM

## 2021-10-05 DIAGNOSIS — M54.16 LUMBAR RADICULOPATHY: ICD-10-CM

## 2021-10-05 DIAGNOSIS — M43.07 LUMBOSACRAL SPONDYLOLYSIS: ICD-10-CM

## 2021-10-05 RX ORDER — GABAPENTIN 300 MG/1
300 CAPSULE ORAL 3 TIMES DAILY
Qty: 90 CAPSULE | Refills: 0 | Status: SHIPPED | OUTPATIENT
Start: 2021-10-05 | End: 2021-11-01

## 2021-10-05 RX ORDER — HYDROCODONE BITARTRATE AND ACETAMINOPHEN 10; 325 MG/1; MG/1
1 TABLET ORAL EVERY 8 HOURS PRN
Qty: 90 TABLET | Refills: 0 | Status: SHIPPED | OUTPATIENT
Start: 2021-10-05 | End: 2021-10-29

## 2021-10-05 NOTE — TELEPHONE ENCOUNTER
Medication: gabapentin 300 MG Oral Cap    Date of last refill: 08/27/21        Medication: HYDROcodone-acetaminophen  MG Oral Tab    Date of last refill: 09/01/21  [place] 39/14/72    Last office visit: 08/05/21  D

## 2021-10-05 NOTE — TELEPHONE ENCOUNTER
Patient calling to request refill of Janelle Jasmine in [place]    Patient informed of 48 hour refill policy excluding weekends and holidays. Informed patient prescription is sent directly to pharmacy.     Further explained patient will no

## 2021-10-29 DIAGNOSIS — M54.16 LUMBAR RADICULOPATHY: ICD-10-CM

## 2021-10-29 DIAGNOSIS — M43.07 LUMBOSACRAL SPONDYLOLYSIS: ICD-10-CM

## 2021-10-29 DIAGNOSIS — M47.819 ARTHROPATHY OF FACET JOINT: ICD-10-CM

## 2021-10-29 NOTE — TELEPHONE ENCOUNTER
Medication: HYDROcodone-acetaminophen  MG Oral Tab    Date of last refill: 10/05/21  [place] 90/13/83        Medication: gabapentin 300 MG Oral Cap    Date of last refill: 10/05/21    Last office visit: 08/05/21  D

## 2021-10-29 NOTE — TELEPHONE ENCOUNTER
Patient calling to request refill of   HYDROcodone-acetaminophen  MG  Gabapentin 464 [address] [facility] [address] [phone], [phone]    Patient informed of 48 hour refill policy excluding weekends and holid

## 2021-11-01 RX ORDER — GABAPENTIN 300 MG/1
300 CAPSULE ORAL 3 TIMES DAILY
Qty: 90 CAPSULE | Refills: 0 | Status: SHIPPED | OUTPATIENT
Start: 2021-11-01 | End: 2021-12-06

## 2021-11-01 RX ORDER — HYDROCODONE BITARTRATE AND ACETAMINOPHEN 10; 325 MG/1; MG/1
1 TABLET ORAL EVERY 8 HOURS PRN
Qty: 90 TABLET | Refills: 0 | Status: SHIPPED | OUTPATIENT
Start: 2021-11-04 | End: 2021-12-03

## 2021-11-08 DIAGNOSIS — M47.816 LUMBAR SPONDYLOSIS: ICD-10-CM

## 2021-11-09 RX ORDER — TIZANIDINE 2 MG/1
TABLET ORAL
Qty: 120 TABLET | Refills: 0 | Status: SHIPPED | OUTPATIENT
Start: 2021-11-09 | End: 2021-12-04

## 2021-11-23 ENCOUNTER — TELEPHONE (OUTPATIENT)
Dept: CASE MANAGEMENT | Age: 86
End: 2021-11-23

## 2021-12-03 DIAGNOSIS — M43.07 LUMBOSACRAL SPONDYLOLYSIS: ICD-10-CM

## 2021-12-03 DIAGNOSIS — M47.816 LUMBAR SPONDYLOSIS: ICD-10-CM

## 2021-12-03 DIAGNOSIS — M47.819 ARTHROPATHY OF FACET JOINT: ICD-10-CM

## 2021-12-03 DIAGNOSIS — M54.16 LUMBAR RADICULOPATHY: ICD-10-CM

## 2021-12-03 DIAGNOSIS — I10 ESSENTIAL HYPERTENSION: ICD-10-CM

## 2021-12-03 RX ORDER — HYDROCODONE BITARTRATE AND ACETAMINOPHEN 10; 325 MG/1; MG/1
1 TABLET ORAL EVERY 8 HOURS PRN
Qty: 90 TABLET | Refills: 0 | Status: SHIPPED | OUTPATIENT
Start: 2021-12-03 | End: 2021-12-27

## 2021-12-03 RX ORDER — VALSARTAN 80 MG/1
TABLET ORAL
Qty: 90 TABLET | Refills: 0 | Status: SHIPPED | OUTPATIENT
Start: 2021-12-03

## 2021-12-03 NOTE — TELEPHONE ENCOUNTER
Medication: HYDROcodone-acetaminophen  MG Oral Tab    Date of last refill: 11/01/21  [place] 30/53/18    Last office visit: 08/05/21  Due back to clinic per last office note:  na  Date next office visit scheduled:

## 2021-12-03 NOTE — TELEPHONE ENCOUNTER
Medication: gabapentin 300 MG Oral Cap    Date of last refill: 11/01/21      Last office visit: 08/05/21   Due back to clinic per last office note:na    Date next office visit scheduled:  na        Last OV note recommendation:       ASSESSMENT AND PLAN:

## 2021-12-03 NOTE — TELEPHONE ENCOUNTER
Patient calling to request refill of HYDROcodone-acetaminophen  MG Oral Tab  gabapentin 300 MG Oral [address] [facility] [phone], [phone]    Patient informed of 48 hour refill policy excluding weeke

## 2021-12-04 RX ORDER — TIZANIDINE 2 MG/1
TABLET ORAL
Qty: 120 TABLET | Refills: 0 | Status: SHIPPED | OUTPATIENT
Start: 2021-12-04 | End: 2022-01-31

## 2021-12-06 DIAGNOSIS — E78.2 MIXED HYPERLIPIDEMIA: ICD-10-CM

## 2021-12-06 DIAGNOSIS — E03.9 ACQUIRED HYPOTHYROIDISM: ICD-10-CM

## 2021-12-06 DIAGNOSIS — R80.9 TYPE 2 DIABETES MELLITUS WITH MICROALBUMINURIA, WITHOUT LONG-TERM CURRENT USE OF INSULIN (HCC): ICD-10-CM

## 2021-12-06 DIAGNOSIS — E11.29 TYPE 2 DIABETES MELLITUS WITH MICROALBUMINURIA, WITHOUT LONG-TERM CURRENT USE OF INSULIN (HCC): ICD-10-CM

## 2021-12-06 DIAGNOSIS — J45.30 MILD PERSISTENT ASTHMA WITHOUT COMPLICATION: ICD-10-CM

## 2021-12-06 RX ORDER — ATORVASTATIN CALCIUM 40 MG/1
TABLET, FILM COATED ORAL
Qty: 90 TABLET | Refills: 0 | Status: SHIPPED | OUTPATIENT
Start: 2021-12-06 | End: 2021-12-14

## 2021-12-06 RX ORDER — LEVOTHYROXINE SODIUM 0.12 MG/1
TABLET ORAL
Qty: 90 TABLET | Refills: 0 | Status: SHIPPED | OUTPATIENT
Start: 2021-12-06 | End: 2021-12-14

## 2021-12-06 RX ORDER — MONTELUKAST SODIUM 10 MG/1
TABLET ORAL
Qty: 90 TABLET | Refills: 0 | Status: SHIPPED | OUTPATIENT
Start: 2021-12-06

## 2021-12-06 RX ORDER — GABAPENTIN 300 MG/1
300 CAPSULE ORAL 3 TIMES DAILY
Qty: 90 CAPSULE | Refills: 0 | Status: SHIPPED | OUTPATIENT
Start: 2021-12-06 | End: 2021-12-27

## 2021-12-06 NOTE — TELEPHONE ENCOUNTER
Last time medication was refilled 8/27/21  Quantity and number of refills 90 w/ 0   Last OV 9/20/21  Next OV 1/4/22

## 2021-12-10 DIAGNOSIS — R80.9 TYPE 2 DIABETES MELLITUS WITH MICROALBUMINURIA, WITHOUT LONG-TERM CURRENT USE OF INSULIN (HCC): ICD-10-CM

## 2021-12-10 DIAGNOSIS — E78.2 MIXED HYPERLIPIDEMIA: ICD-10-CM

## 2021-12-10 DIAGNOSIS — E11.29 TYPE 2 DIABETES MELLITUS WITH MICROALBUMINURIA, WITHOUT LONG-TERM CURRENT USE OF INSULIN (HCC): ICD-10-CM

## 2021-12-10 DIAGNOSIS — E03.9 ACQUIRED HYPOTHYROIDISM: ICD-10-CM

## 2021-12-10 RX ORDER — ATORVASTATIN CALCIUM 40 MG/1
TABLET, FILM COATED ORAL
Qty: 90 TABLET | Refills: 0 | OUTPATIENT
Start: 2021-12-10

## 2021-12-10 RX ORDER — LEVOTHYROXINE SODIUM 0.12 MG/1
TABLET ORAL
Qty: 90 TABLET | Refills: 0 | OUTPATIENT
Start: 2021-12-10

## 2021-12-14 DIAGNOSIS — E78.2 MIXED HYPERLIPIDEMIA: ICD-10-CM

## 2021-12-14 DIAGNOSIS — R80.9 TYPE 2 DIABETES MELLITUS WITH MICROALBUMINURIA, WITHOUT LONG-TERM CURRENT USE OF INSULIN (HCC): ICD-10-CM

## 2021-12-14 DIAGNOSIS — E03.9 ACQUIRED HYPOTHYROIDISM: ICD-10-CM

## 2021-12-14 DIAGNOSIS — E11.29 TYPE 2 DIABETES MELLITUS WITH MICROALBUMINURIA, WITHOUT LONG-TERM CURRENT USE OF INSULIN (HCC): ICD-10-CM

## 2021-12-14 RX ORDER — ATORVASTATIN CALCIUM 40 MG/1
40 TABLET, FILM COATED ORAL NIGHTLY
Qty: 90 TABLET | Refills: 0 | Status: SHIPPED | OUTPATIENT
Start: 2021-12-14

## 2021-12-14 RX ORDER — LEVOTHYROXINE SODIUM 0.12 MG/1
125 TABLET ORAL
Qty: 90 TABLET | Refills: 0 | Status: SHIPPED | OUTPATIENT
Start: 2021-12-14

## 2021-12-14 NOTE — TELEPHONE ENCOUNTER
ANTICOAGULATION FOLLOW-UP CLINIC VISIT    Patient Name:  Mary Irizarry  Date:  7/15/2019  Contact Type:  Face to Face    SUBJECTIVE:  Patient Findings     Positives:   Change in activity (significant decrease in activity)    Comments:   Per his wife, he is not moving very much at all as his knees are very painful. He did got cortisone in both 3 days ago but it is not helping much at this time. Patient INR high so sent to lab for reflex INR. Warfarin dosing instruction given to patient and I told his wife if anything needs to be changed after I get his lab result I will call them. Wife verbalized understanding and has no questions.         Clinical Outcomes     Comments:   Per his wife, he is not moving very much at all as his knees are very painful. He did got cortisone in both 3 days ago but it is not helping much at this time. Patient INR high so sent to lab for reflex INR. Warfarin dosing instruction given to patient and I told his wife if anything needs to be changed after I get his lab result I will call them. Wife verbalized understanding and has no questions.            OBJECTIVE    INR Protime   Date Value Ref Range Status   07/15/2019 7.4 (A) 0.86 - 1.14 Final       ASSESSMENT / PLAN  INR assessment SUPRA significant activity decrease   Recheck INR In: 4 DAYS    INR Location Clinic      Anticoagulation Summary  As of 7/15/2019    INR goal:   2.0-3.0   TTR:   54.7 % (1.5 y)   INR used for dosin.4! (7/15/2019)   Warfarin maintenance plan:   1.25 mg (2.5 mg x 0.5) every Wed; 2.5 mg (2.5 mg x 1) all other days   Full warfarin instructions:   7/15: Hold; : Hold; : Hold; : 1.25 mg; Otherwise 1.25 mg every Wed; 2.5 mg all other days   Weekly warfarin total:   16.25 mg   Plan last modified:   Marylou Joshi RN (6/3/2019)   Next INR check:   2019   Target end date:       Indications    History of pulmonary embolism-bilateral on a CT scan from 1/10/18 [Z86.711]             Anticoagulation  Pharmacy did not receive previous electronic rx refill on 12/6 for Atorvastatin and Levothyroxine. Requesting resend of prescriptions. Episode Summary     INR check location:       Preferred lab:       Send INR reminders to:   [facility] ANTICOAG POOL    Comments:         Anticoagulation Care Providers     Provider Role Specialty Phone number    JABIER Kent MD [facility] [phone]            See the Encounter Report to view Anticoagulation Flowsheet and Dosing Calendar (Go to Encounters tab in chart review, and find the Anticoagulation Therapy Visit)        Marylou Joshi RN

## 2021-12-27 DIAGNOSIS — M47.819 ARTHROPATHY OF FACET JOINT: ICD-10-CM

## 2021-12-27 DIAGNOSIS — Z79.891 LONG TERM (CURRENT) USE OF OPIATE ANALGESIC: Primary | ICD-10-CM

## 2021-12-27 DIAGNOSIS — M54.16 LUMBAR RADICULOPATHY: ICD-10-CM

## 2021-12-27 DIAGNOSIS — M43.07 LUMBOSACRAL SPONDYLOLYSIS: ICD-10-CM

## 2021-12-27 RX ORDER — HYDROCODONE BITARTRATE AND ACETAMINOPHEN 10; 325 MG/1; MG/1
1 TABLET ORAL EVERY 8 HOURS PRN
Qty: 90 TABLET | Refills: 0 | Status: SHIPPED | OUTPATIENT
Start: 2022-01-02 | End: 2022-01-27

## 2021-12-27 RX ORDER — GABAPENTIN 300 MG/1
300 CAPSULE ORAL 3 TIMES DAILY
Qty: 90 CAPSULE | Refills: 0 | Status: SHIPPED | OUTPATIENT
Start: 2021-12-27 | End: 2022-01-27

## 2021-12-27 NOTE — TELEPHONE ENCOUNTER
Patient calling to request refill of [place] AND Gabapentin  Pharmacy [facility]    Patient informed of 48 hour refill policy excluding weekends and holidays. Informed patient prescription is sent directly to pharmacy.     Further explained patient will not

## 2021-12-27 NOTE — TELEPHONE ENCOUNTER
Medication: GABAPENTIN 300 MG Oral Cap    Date of last refill: 12/06/21        Medication:HYDROCODONE-ACETAMINOPHEN  MG Oral Tab    Date of last refill: 12/03/21  [place] 69/35/32    Last office visit: 08/05/21  Colt

## 2022-01-27 ENCOUNTER — TELEPHONE (OUTPATIENT)
Dept: INTERNAL MEDICINE CLINIC | Facility: CLINIC | Age: 87
End: 2022-01-27

## 2022-01-27 DIAGNOSIS — M43.07 LUMBOSACRAL SPONDYLOLYSIS: ICD-10-CM

## 2022-01-27 DIAGNOSIS — M54.16 LUMBAR RADICULOPATHY: ICD-10-CM

## 2022-01-27 DIAGNOSIS — M47.819 ARTHROPATHY OF FACET JOINT: ICD-10-CM

## 2022-01-27 RX ORDER — HYDROCODONE BITARTRATE AND ACETAMINOPHEN 10; 325 MG/1; MG/1
1 TABLET ORAL EVERY 8 HOURS PRN
Qty: 90 TABLET | Refills: 0 | Status: SHIPPED | OUTPATIENT
Start: 2022-01-27 | End: 2022-01-31

## 2022-01-27 RX ORDER — GABAPENTIN 300 MG/1
300 CAPSULE ORAL 3 TIMES DAILY
Qty: 90 CAPSULE | Refills: 0 | Status: SHIPPED | OUTPATIENT
Start: 2022-01-27

## 2022-01-27 NOTE — TELEPHONE ENCOUNTER
Patient called to advise us that on   Edison 3 she was called by the office to confirm her Jan 4th appt. Her family had covid so she asked to cancel the appt due to exposure but was charged for no show of the appointment. Please remove no show charge for this appointment.      Thank you

## 2022-01-27 NOTE — TELEPHONE ENCOUNTER
Medication: gabapentin 300 MG Oral Cap    Date of last refill: 12/27/21        Medication: HYDROcodone-acetaminophen  MG Oral Tab    Date of last refill: 12/03/21  [place] 56/64/75    Last office visit: 08/05/21  D

## 2022-01-30 ENCOUNTER — IMMUNIZATION (OUTPATIENT)
Dept: LAB | Facility: HOSPITAL | Age: 87
End: 2022-01-30
Attending: EMERGENCY MEDICINE
Payer: MEDICARE

## 2022-01-30 DIAGNOSIS — Z23 NEED FOR VACCINATION: Primary | ICD-10-CM

## 2022-01-30 PROCEDURE — 0054A SARSCOV2 VAC 30MCG TRS SUCR: CPT

## 2022-01-31 ENCOUNTER — TELEPHONE (OUTPATIENT)
Dept: PAIN CLINIC | Facility: CLINIC | Age: 87
End: 2022-01-31

## 2022-01-31 ENCOUNTER — OFFICE VISIT (OUTPATIENT)
Dept: PAIN CLINIC | Facility: CLINIC | Age: 87
End: 2022-01-31
Payer: MEDICARE

## 2022-01-31 VITALS
BODY MASS INDEX: 29 KG/M2 | OXYGEN SATURATION: 98 % | HEART RATE: 92 BPM | WEIGHT: 158 LBS | SYSTOLIC BLOOD PRESSURE: 124 MMHG | DIASTOLIC BLOOD PRESSURE: 78 MMHG

## 2022-01-31 DIAGNOSIS — M54.16 LUMBAR RADICULOPATHY: ICD-10-CM

## 2022-01-31 DIAGNOSIS — M47.816 ARTHRITIS OF FACET JOINT OF LUMBAR SPINE: Primary | ICD-10-CM

## 2022-01-31 DIAGNOSIS — Z79.891 LONG TERM (CURRENT) USE OF OPIATE ANALGESIC: ICD-10-CM

## 2022-01-31 DIAGNOSIS — R44.2 TACTILE HALLUCINATIONS: ICD-10-CM

## 2022-01-31 DIAGNOSIS — M43.07 LUMBOSACRAL SPONDYLOLYSIS: ICD-10-CM

## 2022-01-31 DIAGNOSIS — M47.819 ARTHROPATHY OF FACET JOINT: ICD-10-CM

## 2022-01-31 DIAGNOSIS — M47.816 LUMBAR SPONDYLOSIS: ICD-10-CM

## 2022-01-31 DIAGNOSIS — T88.7XXA MEDICATION SIDE EFFECT: ICD-10-CM

## 2022-01-31 PROCEDURE — 3078F DIAST BP <80 MM HG: CPT | Performed by: NURSE PRACTITIONER

## 2022-01-31 PROCEDURE — 3074F SYST BP LT 130 MM HG: CPT | Performed by: NURSE PRACTITIONER

## 2022-01-31 PROCEDURE — 99214 OFFICE O/P EST MOD 30 MIN: CPT | Performed by: NURSE PRACTITIONER

## 2022-01-31 RX ORDER — TIZANIDINE 2 MG/1
TABLET ORAL
Qty: 120 TABLET | Refills: 0 | COMMUNITY
Start: 2022-01-31

## 2022-01-31 RX ORDER — MELOXICAM 7.5 MG/1
7.5 TABLET ORAL DAILY
Qty: 30 TABLET | Refills: 0 | Status: SHIPPED | OUTPATIENT
Start: 2022-01-31

## 2022-01-31 RX ORDER — HYDROCODONE BITARTRATE AND ACETAMINOPHEN 10; 325 MG/1; MG/1
TABLET ORAL
Qty: 30 TABLET | Refills: 0 | COMMUNITY
Start: 2022-01-31

## 2022-01-31 NOTE — PATIENT INSTRUCTIONS
Refill policies:    • Allow 2-3 business days for refills; controlled substances may take longer.   • Contact your pharmacy at least 5 days prior to running out of medication and have them send an electronic request or submit request through the “request re Depending on your insurance carrier, approval may take 3-10 days. It is highly recommended patients contact their insurance carrier directly to determine coverage.   If test is done without insurance authorization, patient may be responsible for the entire pain to avoid norco medications  • >reduce tizanidine 2mg to twice per day for one week and then decrease to one tab per day and then STOP TAKING TIZANIDINE  • >Reduce the norco 10/325 to 2.5 tabs per day for the next month, then we will reduce to twice pe

## 2022-01-31 NOTE — TELEPHONE ENCOUNTER
Pt daughter states she is sure her or her sister would of called to cx the appt that was marked as a no show on 11/9/2021. Pt daughter is requesting $40 no show fee be waived.

## 2022-01-31 NOTE — PROGRESS NOTES
HPI:   Julia Mir presents for medication follow up. She was last seen in this office on his 8/5/ 2021 by Dr. Maribel Quevedo where she was complaining of pain in the left side of her neck.     At that time patient was using Norco 10/325 1 tablet every 8 hours decrease the patient’s pain: medications, changing position    Functional Assessment: Patient reports that they are able to complete all of their ADL's such as eating, bathing, using the toilet, dressing and getting up from a bed or a chair independently. Score:  5/10    General Appearance: Well developed, Well nourished, Normal build, Well groomed, Ambulates with cane    Neurological Exam: WNL-Orientation to time, place and person, normal mood & effect, normal concentration & attention span   Inspection: was recommended to have diagnostic cervical facet injections, however patient did not proceed with these injections as recommended by Dr. Keerthi Rg.   Additionally there is a visit to primary care nurse practitioner where patient was feeling that she was having evaluation for possible adding antidepressant to support mood and pain  • >may continue gabapentin at 300mg three times per day  • >follow up one month or sooner based on symptoms. No orders of the defined types were placed in this encounter.       Meds

## 2022-01-31 NOTE — PROGRESS NOTES
Patient presents in office today with reported pain in low back    Current pain level reported = 5/10    Last reported dose of norco and gabapentin this morning      Narcotic Contract renewal 05/03/22    Urine Drug screen 05/03/21

## 2022-02-23 NOTE — TELEPHONE ENCOUNTER
Patient calling to request refill of   gabapentin 300 MG Oral Cap    HYDROcodone-acetaminophen  MG Oral Tab    Pharmacy   [address] [place] [address] W [phone], [phone]    Patient informed of 48 hour refill policy excluding weekends and holidays. Informed patient prescription is sent directly to pharmacy. Further explained patient will not receive a call back once prescription is ready.

## 2022-02-24 RX ORDER — GABAPENTIN 300 MG/1
300 CAPSULE ORAL 3 TIMES DAILY
Qty: 90 CAPSULE | Refills: 0 | Status: SHIPPED | OUTPATIENT
Start: 2022-02-24

## 2022-02-26 ENCOUNTER — OFFICE VISIT (OUTPATIENT)
Dept: INTERNAL MEDICINE CLINIC | Facility: CLINIC | Age: 87
End: 2022-02-26
Payer: MEDICARE

## 2022-02-26 VITALS
TEMPERATURE: 98 F | HEIGHT: 57 IN | WEIGHT: 156 LBS | SYSTOLIC BLOOD PRESSURE: 138 MMHG | RESPIRATION RATE: 12 BRPM | DIASTOLIC BLOOD PRESSURE: 88 MMHG | BODY MASS INDEX: 33.66 KG/M2 | HEART RATE: 71 BPM | OXYGEN SATURATION: 97 %

## 2022-02-26 DIAGNOSIS — F32.1 MAJOR DEPRESSIVE DISORDER, SINGLE EPISODE, MODERATE (HCC): ICD-10-CM

## 2022-02-26 DIAGNOSIS — M46.1 SACROILIITIS (HCC): ICD-10-CM

## 2022-02-26 DIAGNOSIS — R80.9 TYPE 2 DIABETES MELLITUS WITH MICROALBUMINURIA, WITHOUT LONG-TERM CURRENT USE OF INSULIN (HCC): ICD-10-CM

## 2022-02-26 DIAGNOSIS — Z00.00 ENCOUNTER FOR ANNUAL HEALTH EXAMINATION: Primary | ICD-10-CM

## 2022-02-26 DIAGNOSIS — M54.12 CERVICAL RADICULAR PAIN: ICD-10-CM

## 2022-02-26 DIAGNOSIS — K21.9 GASTROESOPHAGEAL REFLUX DISEASE WITHOUT ESOPHAGITIS: ICD-10-CM

## 2022-02-26 DIAGNOSIS — Z86.73 HISTORY OF STROKE: ICD-10-CM

## 2022-02-26 DIAGNOSIS — Z78.0 POSTMENOPAUSAL: ICD-10-CM

## 2022-02-26 DIAGNOSIS — E03.9 ACQUIRED HYPOTHYROIDISM: ICD-10-CM

## 2022-02-26 DIAGNOSIS — E11.29 TYPE 2 DIABETES MELLITUS WITH MICROALBUMINURIA, WITHOUT LONG-TERM CURRENT USE OF INSULIN (HCC): ICD-10-CM

## 2022-02-26 DIAGNOSIS — M47.816 LUMBAR SPONDYLOSIS: ICD-10-CM

## 2022-02-26 DIAGNOSIS — Z79.899 ENCOUNTER FOR LONG-TERM (CURRENT) USE OF MEDICATIONS: ICD-10-CM

## 2022-02-26 DIAGNOSIS — Z79.891 LONG TERM (CURRENT) USE OF OPIATE ANALGESIC: ICD-10-CM

## 2022-02-26 DIAGNOSIS — E78.2 MIXED HYPERLIPIDEMIA: ICD-10-CM

## 2022-02-26 DIAGNOSIS — I10 ESSENTIAL HYPERTENSION: ICD-10-CM

## 2022-02-26 DIAGNOSIS — J45.30 MILD PERSISTENT ASTHMA WITHOUT COMPLICATION: ICD-10-CM

## 2022-02-26 PROCEDURE — G0439 PPPS, SUBSEQ VISIT: HCPCS | Performed by: PHYSICIAN ASSISTANT

## 2022-02-26 PROCEDURE — 3079F DIAST BP 80-89 MM HG: CPT | Performed by: PHYSICIAN ASSISTANT

## 2022-02-26 PROCEDURE — 3075F SYST BP GE 130 - 139MM HG: CPT | Performed by: PHYSICIAN ASSISTANT

## 2022-02-26 PROCEDURE — 99397 PER PM REEVAL EST PAT 65+ YR: CPT | Performed by: PHYSICIAN ASSISTANT

## 2022-02-26 PROCEDURE — 3008F BODY MASS INDEX DOCD: CPT | Performed by: PHYSICIAN ASSISTANT

## 2022-02-26 PROCEDURE — 96160 PT-FOCUSED HLTH RISK ASSMT: CPT | Performed by: PHYSICIAN ASSISTANT

## 2022-02-27 PROBLEM — M47.816 ARTHRITIS OF FACET JOINT OF LUMBAR SPINE: Status: RESOLVED | Noted: 2020-06-24 | Resolved: 2022-02-27

## 2022-02-28 ENCOUNTER — TELEPHONE (OUTPATIENT)
Dept: PAIN CLINIC | Facility: CLINIC | Age: 87
End: 2022-02-28

## 2022-02-28 ENCOUNTER — OFFICE VISIT (OUTPATIENT)
Dept: PAIN CLINIC | Facility: CLINIC | Age: 87
End: 2022-02-28
Payer: MEDICARE

## 2022-02-28 VITALS — OXYGEN SATURATION: 96 % | HEART RATE: 88 BPM | SYSTOLIC BLOOD PRESSURE: 138 MMHG | DIASTOLIC BLOOD PRESSURE: 76 MMHG

## 2022-02-28 DIAGNOSIS — M43.07 LUMBOSACRAL SPONDYLOLYSIS: ICD-10-CM

## 2022-02-28 DIAGNOSIS — M47.816 FACET ARTHROPATHY, LUMBAR: Primary | ICD-10-CM

## 2022-02-28 DIAGNOSIS — F22 DELUSION (HCC): ICD-10-CM

## 2022-02-28 PROCEDURE — 99214 OFFICE O/P EST MOD 30 MIN: CPT | Performed by: NURSE PRACTITIONER

## 2022-02-28 PROCEDURE — 3078F DIAST BP <80 MM HG: CPT | Performed by: NURSE PRACTITIONER

## 2022-02-28 PROCEDURE — 3075F SYST BP GE 130 - 139MM HG: CPT | Performed by: NURSE PRACTITIONER

## 2022-02-28 NOTE — PROGRESS NOTES
Patient presents in office today with reported pain in low back, legs    Current pain level reported = 3/10    Last reported dose of norco this morning      Narcotic Contract renewal 5/3/21    Urine Drug screen 5/3/21

## 2022-02-28 NOTE — TELEPHONE ENCOUNTER
Pt daughter called to advised that pt hasn't been taking Meloxicam because she read about the side effects. She states that it gave pt chest pain. Cruz Riley wants to know if we can go over the side effects of both meloxicam and hydrocodone.  She wants pt to understand that the effects of hydrocodone is worse than the meloxicam.     Pt is scheduled for today 2/28/22 at 10:30

## 2022-03-01 ENCOUNTER — TELEPHONE (OUTPATIENT)
Dept: NEUROLOGY | Facility: CLINIC | Age: 87
End: 2022-03-01

## 2022-03-01 NOTE — TELEPHONE ENCOUNTER
Prior authorization request completed for: Bilateral L4, L5 facet injections   Authorization # [id number]   Authorization dates: 3/1/2022 - 4/2/2022  CPT codes approved: 65323 / 85336   Number of visits/dates of service approved: 1  Physician: Dr. Bree Hodge   Location: [facility] to schedule

## 2022-03-01 NOTE — TELEPHONE ENCOUNTER
Question Answer   Anesthesia Type Local   Provider [facility]   Procedure Facet   Laterality/Level BILATERAL L4/5, L5/S1   Medical clearance requested (will send to Pain Navigator) No   Patient has Medicare coverage?  No

## 2022-03-01 NOTE — TELEPHONE ENCOUNTER
Called and spoke to daughter, Uziel Garcia. Uziel Garcia stated that she is not with her mother so she would need to call back. Informed Uziel Garcia we are open Mon-Fri 8-4. Justine SERRATO. No further needs at this time.

## 2022-03-02 ENCOUNTER — TELEPHONE (OUTPATIENT)
Dept: PAIN CLINIC | Facility: CLINIC | Age: 87
End: 2022-03-02

## 2022-03-02 NOTE — TELEPHONE ENCOUNTER
Patient calling to request refill of   HYDROcodone-acetaminophen  MG Oral Tab  Pharmacy  [address] [place] [address] W [phone], [phone]    Patient informed of 48 hour refill policy excluding weekends and holidays. Informed patient prescription is sent directly to pharmacy. Further explained patient will not receive a call back once prescription is ready.

## 2022-03-03 RX ORDER — HYDROCODONE BITARTRATE AND ACETAMINOPHEN 10; 325 MG/1; MG/1
1 TABLET ORAL EVERY 8 HOURS PRN
Qty: 75 TABLET | Refills: 0 | Status: SHIPPED | OUTPATIENT
Start: 2022-03-03 | End: 2022-03-30

## 2022-03-03 NOTE — TELEPHONE ENCOUNTER
Reduced to 2.5/day (#75) as per the plan. Next month, will continue to reduce by 0.5 tablets until off.

## 2022-03-06 ENCOUNTER — HOSPITAL ENCOUNTER (OUTPATIENT)
Dept: BONE DENSITY | Age: 87
Discharge: HOME | End: 2022-03-06
Attending: PHYSICIAN ASSISTANT
Payer: MEDICARE

## 2022-03-06 DIAGNOSIS — Z78.0 POSTMENOPAUSAL: ICD-10-CM

## 2022-03-06 PROCEDURE — 77080 DXA BONE DENSITY AXIAL: CPT | Performed by: PHYSICIAN ASSISTANT

## 2022-03-08 ENCOUNTER — LAB ENCOUNTER (OUTPATIENT)
Dept: LAB | Age: 87
End: 2022-03-08
Attending: PHYSICIAN ASSISTANT
Payer: MEDICARE

## 2022-03-08 DIAGNOSIS — R80.9 TYPE 2 DIABETES MELLITUS WITH MICROALBUMINURIA, WITHOUT LONG-TERM CURRENT USE OF INSULIN (HCC): ICD-10-CM

## 2022-03-08 DIAGNOSIS — Z79.899 ENCOUNTER FOR LONG-TERM (CURRENT) USE OF MEDICATIONS: ICD-10-CM

## 2022-03-08 DIAGNOSIS — E11.29 TYPE 2 DIABETES MELLITUS WITH MICROALBUMINURIA, WITHOUT LONG-TERM CURRENT USE OF INSULIN (HCC): ICD-10-CM

## 2022-03-08 LAB
ALBUMIN SERPL-MCNC: 3.9 G/DL (ref 3.4–5)
ALBUMIN/GLOB SERPL: 1.3 {RATIO} (ref 1–2)
ALP LIVER SERPL-CCNC: 80 U/L
ALT SERPL-CCNC: 24 U/L
ANION GAP SERPL CALC-SCNC: 4 MMOL/L (ref 0–18)
AST SERPL-CCNC: 16 U/L (ref 15–37)
BASOPHILS # BLD AUTO: 0.05 X10(3) UL (ref 0–0.2)
BASOPHILS NFR BLD AUTO: 1 %
BILIRUB SERPL-MCNC: 0.4 MG/DL (ref 0.1–2)
BILIRUB UR QL STRIP.AUTO: NEGATIVE
BUN BLD-MCNC: 12 MG/DL (ref 7–18)
CALCIUM BLD-MCNC: 9.3 MG/DL (ref 8.5–10.1)
CHLORIDE SERPL-SCNC: 107 MMOL/L (ref 98–112)
CHOLEST SERPL-MCNC: 107 MG/DL (ref ?–200)
CLARITY UR REFRACT.AUTO: CLEAR
CO2 SERPL-SCNC: 31 MMOL/L (ref 21–32)
CREAT BLD-MCNC: 0.58 MG/DL
CREAT UR-SCNC: 33.9 MG/DL
EOSINOPHIL # BLD AUTO: 0.17 X10(3) UL (ref 0–0.7)
EOSINOPHIL NFR BLD AUTO: 3.4 %
ERYTHROCYTE [DISTWIDTH] IN BLOOD BY AUTOMATED COUNT: 12.7 %
EST. AVERAGE GLUCOSE BLD GHB EST-MCNC: 137 MG/DL (ref 68–126)
FASTING PATIENT LIPID ANSWER: YES
FASTING STATUS PATIENT QL REPORTED: YES
GLOBULIN PLAS-MCNC: 3.1 G/DL (ref 2.8–4.4)
GLUCOSE BLD-MCNC: 89 MG/DL (ref 70–99)
GLUCOSE UR STRIP.AUTO-MCNC: NEGATIVE MG/DL
HBA1C MFR BLD: 6.4 % (ref ?–5.7)
HCT VFR BLD AUTO: 41.1 %
HDLC SERPL-MCNC: 58 MG/DL (ref 40–59)
HGB BLD-MCNC: 12.9 G/DL
IMM GRANULOCYTES # BLD AUTO: 0.02 X10(3) UL (ref 0–1)
IMM GRANULOCYTES NFR BLD: 0.4 %
KETONES UR STRIP.AUTO-MCNC: NEGATIVE MG/DL
LDLC SERPL CALC-MCNC: 35 MG/DL (ref ?–100)
LYMPHOCYTES # BLD AUTO: 1.24 X10(3) UL (ref 1–4)
LYMPHOCYTES NFR BLD AUTO: 24.6 %
MCH RBC QN AUTO: 28.5 PG (ref 26–34)
MCHC RBC AUTO-ENTMCNC: 31.4 G/DL (ref 31–37)
MCV RBC AUTO: 90.9 FL
MICROALBUMIN UR-MCNC: 5.13 MG/DL
MICROALBUMIN/CREAT 24H UR-RTO: 151.3 UG/MG (ref ?–30)
MONOCYTES # BLD AUTO: 0.47 X10(3) UL (ref 0.1–1)
MONOCYTES NFR BLD AUTO: 9.3 %
NEUTROPHILS # BLD AUTO: 3.09 X10 (3) UL (ref 1.5–7.7)
NEUTROPHILS # BLD AUTO: 3.09 X10(3) UL (ref 1.5–7.7)
NEUTROPHILS NFR BLD AUTO: 61.3 %
NITRITE UR QL STRIP.AUTO: NEGATIVE
NONHDLC SERPL-MCNC: 49 MG/DL (ref ?–130)
OSMOLALITY SERPL CALC.SUM OF ELEC: 293 MOSM/KG (ref 275–295)
PH UR STRIP.AUTO: 5 [PH] (ref 5–8)
PLATELET # BLD AUTO: 204 10(3)UL (ref 150–450)
PROT SERPL-MCNC: 7 G/DL (ref 6.4–8.2)
PROT UR STRIP.AUTO-MCNC: NEGATIVE MG/DL
RBC # BLD AUTO: 4.52 X10(6)UL
SODIUM SERPL-SCNC: 142 MMOL/L (ref 136–145)
SP GR UR STRIP.AUTO: 1.01 (ref 1–1.03)
T3FREE SERPL-MCNC: 2.37 PG/ML (ref 2.4–4.2)
T4 FREE SERPL-MCNC: 1.2 NG/DL (ref 0.8–1.7)
TRIGL SERPL-MCNC: 67 MG/DL (ref 30–149)
TSI SER-ACNC: 0.24 MIU/ML (ref 0.36–3.74)
UROBILINOGEN UR STRIP.AUTO-MCNC: <2 MG/DL
VLDLC SERPL CALC-MCNC: 9 MG/DL (ref 0–30)
WBC # BLD AUTO: 5 X10(3) UL (ref 4–11)

## 2022-03-08 PROCEDURE — 80061 LIPID PANEL: CPT

## 2022-03-08 PROCEDURE — 81001 URINALYSIS AUTO W/SCOPE: CPT

## 2022-03-08 PROCEDURE — 82570 ASSAY OF URINE CREATININE: CPT

## 2022-03-08 PROCEDURE — 84481 FREE ASSAY (FT-3): CPT

## 2022-03-08 PROCEDURE — 36415 COLL VENOUS BLD VENIPUNCTURE: CPT

## 2022-03-08 PROCEDURE — 80053 COMPREHEN METABOLIC PANEL: CPT

## 2022-03-08 PROCEDURE — 82043 UR ALBUMIN QUANTITATIVE: CPT

## 2022-03-08 PROCEDURE — 84439 ASSAY OF FREE THYROXINE: CPT

## 2022-03-08 PROCEDURE — 84443 ASSAY THYROID STIM HORMONE: CPT

## 2022-03-08 PROCEDURE — 83036 HEMOGLOBIN GLYCOSYLATED A1C: CPT

## 2022-03-08 PROCEDURE — 85025 COMPLETE CBC W/AUTO DIFF WBC: CPT

## 2022-03-09 ENCOUNTER — TELEPHONE (OUTPATIENT)
Dept: INTERNAL MEDICINE CLINIC | Facility: CLINIC | Age: 87
End: 2022-03-09

## 2022-03-09 RX ORDER — LEVOTHYROXINE SODIUM 112 UG/1
112 TABLET ORAL
Qty: 90 TABLET | Refills: 0 | Status: SHIPPED | OUTPATIENT
Start: 2022-03-09

## 2022-03-09 RX ORDER — OMEPRAZOLE 20 MG/1
CAPSULE, DELAYED RELEASE ORAL
Qty: 90 CAPSULE | Refills: 0 | OUTPATIENT
Start: 2022-03-09

## 2022-03-09 NOTE — TELEPHONE ENCOUNTER
Spoke with pt  Results and recommendations reviewed  Notified of new Rx and repeat lab  Pt v/u  No further questions

## 2022-03-09 NOTE — TELEPHONE ENCOUNTER
Attempted to call pt  No VM available to leave a message  Will try again later    Repeat lab and Rx ordered

## 2022-03-09 NOTE — TELEPHONE ENCOUNTER
----- Message from Pascual Ramesh PA-C sent at 3/8/2022  4:38 PM CST -----  Please inform pt: cholesterol and diabetes are at goal continue current meds  Synthroid dose is too high, decrease to 112 mcg daily and recheck TSh 6w  Stable microalbuminuria, with stable creatinine and GFR, continue ARB. No concerning findings on blood count.

## 2022-03-18 NOTE — TELEPHONE ENCOUNTER
Pt daughter Lidia Urias called to schedule procedure. She asked that we call pt other daughter Amber Wilson to schedule at [phone].     Please advise

## 2022-03-21 ENCOUNTER — TELEPHONE (OUTPATIENT)
Dept: INTERNAL MEDICINE CLINIC | Facility: CLINIC | Age: 87
End: 2022-03-21

## 2022-03-21 NOTE — TELEPHONE ENCOUNTER
Patient's daughter Marleen Hancock called in stating that she is concerned about urine test results and has questions about if results could have been impacted by taking biotin supplements. Patient daughter would like clarification of what repeat lab she needs to take as well. Patient daughter works late and would prefer communication with Rn through [employer].

## 2022-03-23 ENCOUNTER — MED REC SCAN ONLY (OUTPATIENT)
Dept: INTERNAL MEDICINE CLINIC | Facility: CLINIC | Age: 87
End: 2022-03-23

## 2022-03-30 DIAGNOSIS — M54.16 LUMBAR RADICULOPATHY: ICD-10-CM

## 2022-03-30 DIAGNOSIS — M47.819 ARTHROPATHY OF FACET JOINT: ICD-10-CM

## 2022-03-30 DIAGNOSIS — M43.07 LUMBOSACRAL SPONDYLOLYSIS: ICD-10-CM

## 2022-03-30 NOTE — TELEPHONE ENCOUNTER
Looks like she is being reduced, though was asked to follow up in a month. Happy to send prescription to pharmacy, though ask that, before I do, she make an appointment for follow up. Once she does, will release the prescription.

## 2022-03-30 NOTE — TELEPHONE ENCOUNTER
Patient calling to request refill of   HYDROcodone-acetaminophen  MG Oral Tab, gabapentin 300 MG Oral [address] [address] [phone], [phone]    Patient informed of 48 hour refill policy excluding weekends and holidays. Informed patient prescription is sent directly to pharmacy. Further explained patient will not receive a call back once prescription is ready.

## 2022-03-30 NOTE — TELEPHONE ENCOUNTER
Prior authorization request completed for: Bilateral L4, L5 facet injections   Authorization # [id number]   Authorization dates: 4/2/2022 - 5/2/2022  CPT codes approved: 15733 / 54084   Number of visits/dates of service approved: 1  Physician: Dr. Evan Mcgraw   Location: Satinder Paz to schedule      Called [facility] and spoke with [facility] who was able to updated the auth date

## 2022-03-30 NOTE — TELEPHONE ENCOUNTER
Spoke with pt's daughter, Rd Perdomo. Emmy Thomas was made aware that the authorization is only approved until 4-2-22. Cannot get pt in prior to the authorization expiration date. Routing to PA for extension. MD Office

## 2022-03-31 RX ORDER — HYDROCODONE BITARTRATE AND ACETAMINOPHEN 10; 325 MG/1; MG/1
1 TABLET ORAL EVERY 12 HOURS PRN
Qty: 60 TABLET | Refills: 0 | Status: SHIPPED | OUTPATIENT
Start: 2022-04-02 | End: 2022-06-08

## 2022-03-31 NOTE — TELEPHONE ENCOUNTER
Per Dr. Jayant Reid- he will do bilateral facet injections if pt is on blood thinner, but not if the pt is not. This pt is not on blood thinners, therefore need two new referrals for Right & Left L4, L5 Facets with local.    Referrals requested from Dr. Jayant Reid.

## 2022-04-01 ENCOUNTER — TELEPHONE (OUTPATIENT)
Dept: INTERNAL MEDICINE CLINIC | Facility: CLINIC | Age: 87
End: 2022-04-01

## 2022-04-01 NOTE — TELEPHONE ENCOUNTER
Dr. Mroa Correia sent cases but we are in need of referrals. Will request again from [address] [place] [address].

## 2022-04-01 NOTE — TELEPHONE ENCOUNTER
Fax received from [place] [phone] regarding denial of coverage for Omeprazole. Fax placed in triage.

## 2022-04-04 ENCOUNTER — TELEPHONE (OUTPATIENT)
Dept: NEUROLOGY | Facility: CLINIC | Age: 87
End: 2022-04-04

## 2022-04-04 NOTE — TELEPHONE ENCOUNTER
Dr. Terry Block placed referral for bilateral. Will discuss with him tomorrow if he is willing to do bilateral procedure for this pt or not.

## 2022-04-04 NOTE — TELEPHONE ENCOUNTER
Prior authorization request completed for: Bilateral facet L4-L5, L5-S1  Authorization # [id number]  Authorization dates: 4/4/2022 - 5/4/2022  CPT codes approved: 23530 / 08894  Number of visits/dates of service approved: 1  Physician: Dr. Ana Lilia Alcazar  Location: THE [facility]     Call Ref#: N/A  Representative Name: N/A    Patient can be scheduled. Routed to Navigator.

## 2022-04-05 NOTE — TELEPHONE ENCOUNTER
Spoke with Cierra Buckley who stated again that he will not do bilateral facets on this pt. Per his request, routing back to him for referral placement of right and left facets.

## 2022-04-06 RX ORDER — GABAPENTIN 300 MG/1
300 CAPSULE ORAL 3 TIMES DAILY
Qty: 90 CAPSULE | Refills: 0 | Status: SHIPPED | OUTPATIENT
Start: 2022-04-06

## 2022-04-06 NOTE — TELEPHONE ENCOUNTER
Pt notified  Pt stated she has received generic version of PPI  Pt unable to recall the name  Advised to have daughter call us to update medication records  Pt v/u

## 2022-04-07 ENCOUNTER — TELEPHONE (OUTPATIENT)
Dept: NEUROLOGY | Facility: CLINIC | Age: 87
End: 2022-04-07

## 2022-04-07 NOTE — TELEPHONE ENCOUNTER
Prior authorization request completed for: Left facet injection L4-L5, L5-S1  Authorization # [id number]  Authorization dates: 4/7/2022 - 5/7/2022  CPT codes approved: 76409 / 93678  Number of visits/dates of service approved: 1  Physician: Dr. Brisa Myrick   Location: THE [facility]     Call Ref#: N/A  Representative Name: N/A    Patient can be scheduled. Routed to Navigator.

## 2022-04-07 NOTE — TELEPHONE ENCOUNTER
Prior authorization request completed for: Right facet injection L4-L5, L5-S1   Authorization # [id number]  Authorization dates: 4/7/2022 - 5/7/2022   CPT codes approved: 99348 / 46235   Number of visits/dates of service approved: 1  Physician: Dr. Jorge Marrufo   Location: THE [facility]    Call Ref#: N/A  Representative Name: N/A    Patient can be scheduled. Routed to Navigator.

## 2022-04-08 NOTE — TELEPHONE ENCOUNTER
Pt's daughter, Terrell Bettencourt called back to discuss scheduling. She was unsure of dates and times, and temporarily scheduled the left facet injection for 4-18-22, but will be calling back to discuss the right/potentially change the left facet date. Pt calling back.

## 2022-04-15 ENCOUNTER — LAB ENCOUNTER (OUTPATIENT)
Dept: LAB | Facility: HOSPITAL | Age: 87
End: 2022-04-15
Attending: ANESTHESIOLOGY
Payer: MEDICARE

## 2022-04-15 DIAGNOSIS — M47.816 LUMBAR FACET ARTHROPATHY: ICD-10-CM

## 2022-04-16 LAB — SARS-COV-2 RNA RESP QL NAA+PROBE: NOT DETECTED

## 2022-04-18 ENCOUNTER — HOSPITAL ENCOUNTER (OUTPATIENT)
Facility: HOSPITAL | Age: 87
Setting detail: HOSPITAL OUTPATIENT SURGERY
Discharge: HOME | End: 2022-04-18
Attending: ANESTHESIOLOGY | Admitting: ANESTHESIOLOGY
Payer: MEDICARE

## 2022-04-18 ENCOUNTER — APPOINTMENT (OUTPATIENT)
Dept: GENERAL RADIOLOGY | Facility: HOSPITAL | Age: 87
End: 2022-04-18
Attending: ANESTHESIOLOGY
Payer: MEDICARE

## 2022-04-18 VITALS
HEART RATE: 57 BPM | RESPIRATION RATE: 16 BRPM | TEMPERATURE: 99 F | OXYGEN SATURATION: 96 % | SYSTOLIC BLOOD PRESSURE: 140 MMHG | DIASTOLIC BLOOD PRESSURE: 67 MMHG

## 2022-04-18 DIAGNOSIS — M47.816 LUMBAR FACET ARTHROPATHY: ICD-10-CM

## 2022-04-18 LAB — GLUCOSE BLD-MCNC: 94 MG/DL (ref 70–99)

## 2022-04-18 PROCEDURE — 3E0U3BZ INTRODUCTION OF ANESTHETIC AGENT INTO JOINTS, PERCUTANEOUS APPROACH: ICD-10-PCS | Performed by: ANESTHESIOLOGY

## 2022-04-18 PROCEDURE — 3E0U33Z INTRODUCTION OF ANTI-INFLAMMATORY INTO JOINTS, PERCUTANEOUS APPROACH: ICD-10-PCS | Performed by: ANESTHESIOLOGY

## 2022-04-18 PROCEDURE — 99152 MOD SED SAME PHYS/QHP 5/>YRS: CPT | Performed by: ANESTHESIOLOGY

## 2022-04-18 PROCEDURE — 82962 GLUCOSE BLOOD TEST: CPT

## 2022-04-18 RX ORDER — DIPHENHYDRAMINE HYDROCHLORIDE 50 MG/ML
50 INJECTION INTRAMUSCULAR; INTRAVENOUS ONCE AS NEEDED
Status: DISCONTINUED | OUTPATIENT
Start: 2022-04-18 | End: 2022-04-18

## 2022-04-18 RX ORDER — NICOTINE POLACRILEX 4 MG
15 LOZENGE BUCCAL
Status: DISCONTINUED | OUTPATIENT
Start: 2022-04-18 | End: 2022-04-18

## 2022-04-18 RX ORDER — INSULIN ASPART 100 [IU]/ML
3 INJECTION, SOLUTION INTRAVENOUS; SUBCUTANEOUS ONCE
Status: DISCONTINUED | OUTPATIENT
Start: 2022-04-18 | End: 2022-04-18

## 2022-04-18 RX ORDER — MIDAZOLAM HYDROCHLORIDE 1 MG/ML
INJECTION INTRAMUSCULAR; INTRAVENOUS
Status: DISCONTINUED | OUTPATIENT
Start: 2022-04-18 | End: 2022-04-18

## 2022-04-18 RX ORDER — LIDOCAINE HYDROCHLORIDE 10 MG/ML
INJECTION, SOLUTION EPIDURAL; INFILTRATION; INTRACAUDAL; PERINEURAL
Status: DISCONTINUED | OUTPATIENT
Start: 2022-04-18 | End: 2022-04-18

## 2022-04-18 RX ORDER — ONDANSETRON 2 MG/ML
4 INJECTION INTRAMUSCULAR; INTRAVENOUS ONCE AS NEEDED
Status: DISCONTINUED | OUTPATIENT
Start: 2022-04-18 | End: 2022-04-18

## 2022-04-18 RX ORDER — SODIUM CHLORIDE, SODIUM LACTATE, POTASSIUM CHLORIDE, CALCIUM CHLORIDE 600; 310; 30; 20 MG/100ML; MG/100ML; MG/100ML; MG/100ML
100 INJECTION, SOLUTION INTRAVENOUS CONTINUOUS
Status: DISCONTINUED | OUTPATIENT
Start: 2022-04-18 | End: 2022-04-18

## 2022-04-18 RX ORDER — METHYLPREDNISOLONE ACETATE 40 MG/ML
INJECTION, SUSPENSION INTRA-ARTICULAR; INTRALESIONAL; INTRAMUSCULAR; SOFT TISSUE
Status: DISCONTINUED | OUTPATIENT
Start: 2022-04-18 | End: 2022-04-18

## 2022-04-18 RX ORDER — DEXTROSE MONOHYDRATE 25 G/50ML
50 INJECTION, SOLUTION INTRAVENOUS
Status: DISCONTINUED | OUTPATIENT
Start: 2022-04-18 | End: 2022-04-18

## 2022-04-18 RX ORDER — NICOTINE POLACRILEX 4 MG
30 LOZENGE BUCCAL
Status: DISCONTINUED | OUTPATIENT
Start: 2022-04-18 | End: 2022-04-18

## 2022-04-18 NOTE — OPERATIVE REPORT
[facility]  Operative Report  2022     Ana Mccall Patient Status:  Hospital Outpatient Surgery    1935 MRN [id number]   Location [address] [address] Attending Abiel Shepherd MD   Hosp Day # 0 PCP Carina Dean MD     Indication: Sam Padilla is a 82year old female patient with chronic low back pain failed conservative treatment with medication and physical therapy was here for left diagnostic lumbar facet joint injection. Preoperative Diagnosis:  Lumbar facet arthropathy [M47.816]    Postoperative Diagnosis: Same as above. Procedure performed: Left diagnostic lumbar facet joint injection at L4-L5 and L5-S1 level under fluoroscopy. Anesthesia: Local and IV Sedation. EBL: Less than 1 ml. Procedure Description:  After reviewing the patient's history and performing a focused physical examination, the diagnosis was confirmed and contraindications such as infection and coagulopathy were ruled out. Following review of allergies and review of potential side effects and complications, including but not necessarily limited to infection, allergic reaction, local tissue breakdown, nerve injury, and paresis, the patient indicated they understood and agreed to proceed. After obtaining the informed consent, the patient was brought to the procedure room and monitored. Per my order and under my supervision, the patient was sedated with intermittent intravenous doses of versed and fentanyl. The vital signs were monitored and recorded by an experienced RN. The procedure started after the patient was adequately sedated. Moderate intravenous sedation was given for 9 minutes. In the prone position, following sterile prep and drape of the lumbar region, the corresponding facet joints were identified under fluoroscopy. The skin was anesthetized via 25-gauge 1.5\" needle with approximately 2 mL of 1% lidocaine.   A 22-gauge 3.5\" Quincke spinal needle was introduced and advanced into each corresponding facet joint at left L4-5 and L5-S1 level atraumatically under fluoroscopic guidance. Following negative aspiration for CSF and blood, approximately 1 mL of 1% lidocaine with 20 mg of methylprednisolone was injected into each joint without complication. The needle was withdrawn with stylet in situ after being flushed with 0.5 mL lidocaine. The patient tolerated procedure very well. The patient was observed until discharge criteria met. Discharge instructions were given and patient was released to a responsible adult. Complications: None. Follow up: The patient was followed in the pain clinic as needed basis.     Osvaldo Waddell MD

## 2022-04-22 ENCOUNTER — LAB ENCOUNTER (OUTPATIENT)
Dept: LAB | Facility: HOSPITAL | Age: 87
End: 2022-04-22
Attending: ANESTHESIOLOGY
Payer: MEDICARE

## 2022-04-22 DIAGNOSIS — M47.816 LUMBAR FACET ARTHROPATHY: ICD-10-CM

## 2022-04-23 LAB — SARS-COV-2 RNA RESP QL NAA+PROBE: NOT DETECTED

## 2022-04-25 ENCOUNTER — APPOINTMENT (OUTPATIENT)
Dept: GENERAL RADIOLOGY | Facility: HOSPITAL | Age: 87
End: 2022-04-25
Attending: ANESTHESIOLOGY
Payer: MEDICARE

## 2022-04-25 ENCOUNTER — HOSPITAL ENCOUNTER (OUTPATIENT)
Facility: HOSPITAL | Age: 87
Setting detail: HOSPITAL OUTPATIENT SURGERY
Discharge: HOME | End: 2022-04-25
Attending: ANESTHESIOLOGY | Admitting: ANESTHESIOLOGY
Payer: MEDICARE

## 2022-04-25 VITALS
SYSTOLIC BLOOD PRESSURE: 114 MMHG | TEMPERATURE: 98 F | WEIGHT: 156 LBS | BODY MASS INDEX: 33.66 KG/M2 | HEART RATE: 50 BPM | RESPIRATION RATE: 16 BRPM | OXYGEN SATURATION: 87 % | HEIGHT: 57 IN | DIASTOLIC BLOOD PRESSURE: 66 MMHG

## 2022-04-25 DIAGNOSIS — M47.816 LUMBAR FACET ARTHROPATHY: ICD-10-CM

## 2022-04-25 LAB — GLUCOSE BLD-MCNC: 92 MG/DL (ref 70–99)

## 2022-04-25 PROCEDURE — 3E0U33Z INTRODUCTION OF ANTI-INFLAMMATORY INTO JOINTS, PERCUTANEOUS APPROACH: ICD-10-PCS | Performed by: ANESTHESIOLOGY

## 2022-04-25 PROCEDURE — 99152 MOD SED SAME PHYS/QHP 5/>YRS: CPT | Performed by: ANESTHESIOLOGY

## 2022-04-25 PROCEDURE — 82962 GLUCOSE BLOOD TEST: CPT

## 2022-04-25 RX ORDER — NICOTINE POLACRILEX 4 MG
30 LOZENGE BUCCAL
Status: DISCONTINUED | OUTPATIENT
Start: 2022-04-25 | End: 2022-04-25

## 2022-04-25 RX ORDER — LIDOCAINE HYDROCHLORIDE 10 MG/ML
INJECTION, SOLUTION EPIDURAL; INFILTRATION; INTRACAUDAL; PERINEURAL
Status: DISCONTINUED | OUTPATIENT
Start: 2022-04-25 | End: 2022-04-25

## 2022-04-25 RX ORDER — DEXTROSE MONOHYDRATE 25 G/50ML
50 INJECTION, SOLUTION INTRAVENOUS
Status: DISCONTINUED | OUTPATIENT
Start: 2022-04-25 | End: 2022-04-25

## 2022-04-25 RX ORDER — SODIUM CHLORIDE, SODIUM LACTATE, POTASSIUM CHLORIDE, CALCIUM CHLORIDE 600; 310; 30; 20 MG/100ML; MG/100ML; MG/100ML; MG/100ML
100 INJECTION, SOLUTION INTRAVENOUS CONTINUOUS
Status: DISCONTINUED | OUTPATIENT
Start: 2022-04-25 | End: 2022-04-25

## 2022-04-25 RX ORDER — NICOTINE POLACRILEX 4 MG
15 LOZENGE BUCCAL
Status: DISCONTINUED | OUTPATIENT
Start: 2022-04-25 | End: 2022-04-25

## 2022-04-25 RX ORDER — INSULIN ASPART 100 [IU]/ML
3 INJECTION, SOLUTION INTRAVENOUS; SUBCUTANEOUS ONCE
Status: DISCONTINUED | OUTPATIENT
Start: 2022-04-25 | End: 2022-04-25

## 2022-04-25 RX ORDER — METHYLPREDNISOLONE ACETATE 40 MG/ML
INJECTION, SUSPENSION INTRA-ARTICULAR; INTRALESIONAL; INTRAMUSCULAR; SOFT TISSUE
Status: DISCONTINUED | OUTPATIENT
Start: 2022-04-25 | End: 2022-04-25

## 2022-04-25 RX ORDER — ONDANSETRON 2 MG/ML
4 INJECTION INTRAMUSCULAR; INTRAVENOUS ONCE AS NEEDED
Status: DISCONTINUED | OUTPATIENT
Start: 2022-04-25 | End: 2022-04-25

## 2022-04-25 RX ORDER — ASPIRIN 81 MG/1
81 TABLET ORAL DAILY
COMMUNITY

## 2022-04-25 RX ORDER — DIPHENHYDRAMINE HYDROCHLORIDE 50 MG/ML
50 INJECTION INTRAMUSCULAR; INTRAVENOUS ONCE AS NEEDED
Status: DISCONTINUED | OUTPATIENT
Start: 2022-04-25 | End: 2022-04-25

## 2022-04-25 RX ORDER — MIDAZOLAM HYDROCHLORIDE 1 MG/ML
INJECTION INTRAMUSCULAR; INTRAVENOUS
Status: DISCONTINUED | OUTPATIENT
Start: 2022-04-25 | End: 2022-04-25

## 2022-04-25 NOTE — OPERATIVE REPORT
[facility]  Operative Report  2022     Taurus Peterson Patient Status:  Hospital Outpatient Surgery    1935 MRN [id number]   Location [address] [address] Attending An aMaría Sheldon MD   Hosp Day # 0 PCP Hunter Grady MD     Indication: Lornea Peters is a 82year old female patient with chronic low back pain failed conservative treatment with medication and physical therapy was here for right diagnostic lumbar facet joint injection. Preoperative Diagnosis:  Lumbar facet arthropathy [M47.816]    Postoperative Diagnosis: Same as above. Procedure performed: Right diagnostic lumbar facet joint injection at L4-L5 and L5-S1 level under fluoroscopy. Anesthesia: Local and IV Sedation. EBL: Less than 1 ml. Procedure Description:  After reviewing the patient's history and performing a focused physical examination, the diagnosis was confirmed and contraindications such as infection and coagulopathy were ruled out. Following review of allergies and review of potential side effects and complications, including but not necessarily limited to infection, allergic reaction, local tissue breakdown, nerve injury, and paresis, the patient indicated they understood and agreed to proceed. After obtaining the informed consent, the patient was brought to the procedure room and monitored. Per my order and under my supervision, the patient was sedated with intermittent intravenous doses of versed and fentanyl. The vital signs were monitored and recorded by an experienced RN. The procedure started after the patient was adequately sedated. Moderate intravenous sedation was given for 10 minutes. In the prone position, following sterile prep and drape of the lumbar region, the corresponding facet joints were identified under fluoroscopy. The skin was anesthetized via 25-gauge 1.5\" needle with approximately 2 mL of 1% lidocaine.   A 22-gauge 3.5\" Quincke spinal needle was introduced and advanced into each corresponding facet joint at right L4-5 and L5-S1 level atraumatically under fluoroscopic guidance. Following negative aspiration for CSF and blood, approximately 1 mL of 1% lidocaine with 20 mg of methylprednisolone was injected into each joint without complication. The needle was withdrawn with stylet in situ after being flushed with 0.5 mL lidocaine. The patient tolerated procedure very well. The patient was observed until discharge criteria met. Discharge instructions were given and patient was released to a responsible adult. Complications: None. Follow up: The patient was followed in the pain clinic as needed basis.     Varghese Head MD

## 2022-04-25 NOTE — INTERVAL H&P NOTE
Pre-op Diagnosis: Lumbar facet arthropathy [M47.816]    The above referenced H&P was reviewed by Michelle Merino MD on 4/25/2022, the patient was examined and no significant changes have occurred in the patient's condition since the H&P was performed. I discussed with the patient and/or legal representative the potential benefits, risks and side effects of this procedure; the likelihood of the patient achieving goals; and potential problems that might occur during recuperation. I discussed reasonable alternatives to the procedure, including risks, benefits and side effects related to the alternatives and risks related to not receiving this procedure. We will proceed with procedure as planned.

## 2022-05-04 NOTE — TELEPHONE ENCOUNTER
Patient calling to request refill of   GABAPENTIN 300 MG Oral Cap  HYDROcodone-acetaminophen  MG Oral Tab  Pharmacy   [address] [place] [address] W [phone], [phone]    Patient informed of 48 hour refill policy excluding weekends and holidays. Informed patient prescription is sent directly to pharmacy. Further explained patient will not receive a call back once prescription is ready.

## 2022-05-09 RX ORDER — GABAPENTIN 300 MG/1
300 CAPSULE ORAL 3 TIMES DAILY
Qty: 90 CAPSULE | Refills: 0 | OUTPATIENT
Start: 2022-05-09

## 2022-05-09 RX ORDER — GABAPENTIN 300 MG/1
300 CAPSULE ORAL 3 TIMES DAILY
Qty: 90 CAPSULE | Refills: 0 | Status: SHIPPED | OUTPATIENT
Start: 2022-05-09

## 2022-05-09 RX ORDER — HYDROCODONE BITARTRATE AND ACETAMINOPHEN 10; 325 MG/1; MG/1
TABLET ORAL
Qty: 60 TABLET | Refills: 0 | OUTPATIENT
Start: 2022-05-09

## 2022-05-09 RX ORDER — HYDROCODONE BITARTRATE AND ACETAMINOPHEN 10; 325 MG/1; MG/1
TABLET ORAL
Qty: 30 TABLET | Refills: 0 | Status: SHIPPED | OUTPATIENT
Start: 2022-05-09

## 2022-05-09 NOTE — TELEPHONE ENCOUNTER
As per plan below, she is weaning her norco.  At last visit, was given 2/day, and, per plan, should now reduce to 1/day. Refilled with these instructions. Can we please remind her of the change?

## 2022-05-09 NOTE — TELEPHONE ENCOUNTER
Pt has been scheduled for 05/17. Pt daughter is asking that medication be refilled because pt is out.

## 2022-05-09 NOTE — TELEPHONE ENCOUNTER
Pt has been scheduled for 05/17 and would like for medication to be refilled due to being all out. Please advise.

## 2022-05-10 RX ORDER — ATORVASTATIN CALCIUM 40 MG/1
40 TABLET, FILM COATED ORAL NIGHTLY
Qty: 90 TABLET | Refills: 0 | Status: SHIPPED | OUTPATIENT
Start: 2022-05-10

## 2022-05-17 ENCOUNTER — OFFICE VISIT (OUTPATIENT)
Dept: PAIN CLINIC | Facility: CLINIC | Age: 87
End: 2022-05-17
Payer: MEDICARE

## 2022-05-17 VITALS
BODY MASS INDEX: 34 KG/M2 | OXYGEN SATURATION: 94 % | HEART RATE: 68 BPM | SYSTOLIC BLOOD PRESSURE: 152 MMHG | WEIGHT: 156 LBS | DIASTOLIC BLOOD PRESSURE: 90 MMHG

## 2022-05-17 DIAGNOSIS — M47.816 FACET ARTHROPATHY, LUMBAR: Primary | ICD-10-CM

## 2022-05-17 DIAGNOSIS — R44.2 TACTILE HALLUCINATIONS: ICD-10-CM

## 2022-05-17 DIAGNOSIS — M47.816 ARTHRITIS OF FACET JOINT OF LUMBAR SPINE: ICD-10-CM

## 2022-05-17 DIAGNOSIS — Z79.891 LONG TERM (CURRENT) USE OF OPIATE ANALGESIC: ICD-10-CM

## 2022-05-17 DIAGNOSIS — M47.816 LUMBAR SPONDYLOSIS: ICD-10-CM

## 2022-05-17 PROCEDURE — 99214 OFFICE O/P EST MOD 30 MIN: CPT | Performed by: NURSE PRACTITIONER

## 2022-05-17 PROCEDURE — 3077F SYST BP >= 140 MM HG: CPT | Performed by: NURSE PRACTITIONER

## 2022-05-17 PROCEDURE — 3080F DIAST BP >= 90 MM HG: CPT | Performed by: NURSE PRACTITIONER

## 2022-05-17 RX ORDER — HYDROCODONE BITARTRATE AND ACETAMINOPHEN 5; 325 MG/1; MG/1
1 TABLET ORAL 2 TIMES DAILY PRN
Qty: 60 TABLET | Refills: 0 | COMMUNITY
Start: 2022-05-17

## 2022-05-17 NOTE — PROGRESS NOTES
Last procedure: LEFT DIAGNOSTIC LUMBAR FACET JOINT INJECTION AT L4-5 AND L5-S1 LEVEL UNDER FLUOROSCOPY WITH CONSCIOUS SEDATION  Date: 04/18/22  Percentage of relief obtained: 0  Duration of relief: na    Current Pain Score: 6        Last procedure: RIGHT DIAGNOSTIC LUMBAR FACET JOINT INJECTION AT L4-L5 AND L5-S1 LEVEL UNDER FLUOROSCOPY WITH CONSCIOUS SEDATION  Date: 04/25/22  Percentage of relief obtained: 0  Duration of relief: na

## 2022-06-02 DIAGNOSIS — M47.819 ARTHROPATHY OF FACET JOINT: ICD-10-CM

## 2022-06-02 DIAGNOSIS — M43.07 LUMBOSACRAL SPONDYLOLYSIS: ICD-10-CM

## 2022-06-02 DIAGNOSIS — M54.16 LUMBAR RADICULOPATHY: ICD-10-CM

## 2022-06-02 RX ORDER — HYDROCODONE BITARTRATE AND ACETAMINOPHEN 5; 325 MG/1; MG/1
1 TABLET ORAL 2 TIMES DAILY PRN
Qty: 60 TABLET | Refills: 0 | Status: SHIPPED | OUTPATIENT
Start: 2022-06-08

## 2022-06-02 RX ORDER — GABAPENTIN 300 MG/1
300 CAPSULE ORAL 3 TIMES DAILY
Qty: 90 CAPSULE | Refills: 0 | Status: SHIPPED | OUTPATIENT
Start: 2022-06-02

## 2022-06-08 DIAGNOSIS — M54.16 LUMBAR RADICULOPATHY: ICD-10-CM

## 2022-06-08 DIAGNOSIS — M43.07 LUMBOSACRAL SPONDYLOLYSIS: ICD-10-CM

## 2022-06-08 DIAGNOSIS — M47.819 ARTHROPATHY OF FACET JOINT: ICD-10-CM

## 2022-06-08 RX ORDER — HYDROCODONE BITARTRATE AND ACETAMINOPHEN 10; 325 MG/1; MG/1
1 TABLET ORAL EVERY 12 HOURS PRN
Qty: 60 TABLET | Refills: 0 | Status: SHIPPED | OUTPATIENT
Start: 2022-06-08 | End: 2022-07-08

## 2022-06-08 NOTE — TELEPHONE ENCOUNTER
Medication: HYDROcodone-acetaminophen 5-325 MG Oral Tab    Date of last refill: 05/09/22  [place] 28[place]    Last office visit: 05/17/22  Due back to clinic per last office note:  na  Date next office visit scheduled:  na    Last URINE Screen: 05/03/21  Screen Results:       As expected       Narcotic Contract EXPIRATION date: 05/03/22    Last OV note recommendation:       Plan:   > No further injection therapy as it has not resolved or resulted in symptom management  > Patient will remain on low-dose hydrocodone 5/325 twice daily dosing for next refill  > Patient to use Tylenol and ibuprofen in between her doses of opioid medications  > Continue gabapentin 300 mg 3 times daily  > Encouraged psychiatric evaluation patient declined  No orders of the defined types were placed in this encounter.

## 2022-06-16 DIAGNOSIS — E03.9 ACQUIRED HYPOTHYROIDISM: ICD-10-CM

## 2022-06-16 RX ORDER — LEVOTHYROXINE SODIUM 112 UG/1
112 TABLET ORAL
Qty: 90 TABLET | Refills: 0 | Status: SHIPPED | OUTPATIENT
Start: 2022-06-16

## 2022-06-16 NOTE — TELEPHONE ENCOUNTER
Last time medication was refilled 3/9/22   Quantity and # of refills  90 tabs w/0 refills   Last OV  2/26/22   Next OV   Future Appointments   Date Time Provider Lizzette Marci   6/23/2022  3:30 PM Lieutenant Freda MD [facility] [address]   8/27/2022 11:00 AM Paige Cottrell PA-C [facility] [address]     Called patient and advised her to complete blood work (TSH) level and there is an outstanding order in place. Pt verbalized understanding and states she will try to do so. Pt states as well she feels some sort of lump in her throat and is concerned for this and reports she has difficulty swallowing at timesand its been this way for 1 year. She states she has GERD. Advised patient to complete thyroid blood work prior to appt, she is requesting appt with  to discuss her overall health and her thyroid. appt scheduled.

## 2022-07-05 DIAGNOSIS — M43.07 LUMBOSACRAL SPONDYLOLYSIS: ICD-10-CM

## 2022-07-05 DIAGNOSIS — M54.16 LUMBAR RADICULOPATHY: ICD-10-CM

## 2022-07-05 DIAGNOSIS — M47.819 ARTHROPATHY OF FACET JOINT: ICD-10-CM

## 2022-07-05 NOTE — TELEPHONE ENCOUNTER
Patient calling to request refill of HYDROCODONE-ACETAMINOPHEN  MG Oral Tab  gabapentin 300 MG Oral [address] [facility] [phone], [phone]    Patient informed of 48 hour refill policy excluding weekends and holidays. Informed patient prescription is sent directly to pharmacy. Further explained patient will not receive a call back once prescription is ready.

## 2022-07-06 RX ORDER — HYDROCODONE BITARTRATE AND ACETAMINOPHEN 10; 325 MG/1; MG/1
1 TABLET ORAL EVERY 12 HOURS PRN
Qty: 60 TABLET | Refills: 0 | Status: SHIPPED | OUTPATIENT
Start: 2022-07-08 | End: 2022-08-07

## 2022-07-06 RX ORDER — GABAPENTIN 300 MG/1
300 CAPSULE ORAL 3 TIMES DAILY
Qty: 90 CAPSULE | Refills: 0 | Status: SHIPPED | OUTPATIENT
Start: 2022-07-06

## 2022-08-03 DIAGNOSIS — M43.07 LUMBOSACRAL SPONDYLOLYSIS: ICD-10-CM

## 2022-08-03 DIAGNOSIS — M47.819 ARTHROPATHY OF FACET JOINT: ICD-10-CM

## 2022-08-03 DIAGNOSIS — M54.16 LUMBAR RADICULOPATHY: ICD-10-CM

## 2022-08-03 RX ORDER — HYDROCODONE BITARTRATE AND ACETAMINOPHEN 10; 325 MG/1; MG/1
1 TABLET ORAL EVERY 12 HOURS PRN
Qty: 60 TABLET | Refills: 0 | Status: SHIPPED | OUTPATIENT
Start: 2022-08-05 | End: 2022-08-04 | Stop reason: ALTCHOICE

## 2022-08-03 RX ORDER — GABAPENTIN 300 MG/1
300 CAPSULE ORAL 3 TIMES DAILY
Qty: 90 CAPSULE | Refills: 0 | Status: CANCELLED | OUTPATIENT
Start: 2022-08-03

## 2022-08-03 RX ORDER — HYDROCODONE BITARTRATE AND ACETAMINOPHEN 10; 325 MG/1; MG/1
1 TABLET ORAL EVERY 12 HOURS PRN
Qty: 60 TABLET | Refills: 0 | Status: CANCELLED | OUTPATIENT
Start: 2022-08-03 | End: 2022-09-02

## 2022-08-03 RX ORDER — GABAPENTIN 300 MG/1
300 CAPSULE ORAL 3 TIMES DAILY
Qty: 90 CAPSULE | Refills: 0 | Status: SHIPPED | OUTPATIENT
Start: 2022-08-03

## 2022-08-03 NOTE — TELEPHONE ENCOUNTER
Patient calling to request refill of HYDROcodone-acetaminophen  MG Oral Tab  gabapentin 300 MG Oral [address] [facility] [phone], [phone]    Patient informed of 48 hour refill policy excluding weekends and holidays. Informed patient prescription is sent directly to pharmacy. Further explained patient will not receive a call back once prescription is ready.

## 2022-08-04 ENCOUNTER — TELEPHONE (OUTPATIENT)
Dept: INTERNAL MEDICINE CLINIC | Facility: CLINIC | Age: 87
End: 2022-08-04

## 2022-08-04 ENCOUNTER — OFFICE VISIT (OUTPATIENT)
Dept: PAIN CLINIC | Facility: CLINIC | Age: 87
End: 2022-08-04
Payer: MEDICARE

## 2022-08-04 VITALS
HEART RATE: 94 BPM | SYSTOLIC BLOOD PRESSURE: 138 MMHG | BODY MASS INDEX: 34 KG/M2 | OXYGEN SATURATION: 97 % | WEIGHT: 156 LBS | DIASTOLIC BLOOD PRESSURE: 90 MMHG

## 2022-08-04 DIAGNOSIS — M43.07 LUMBOSACRAL SPONDYLOLYSIS: ICD-10-CM

## 2022-08-04 DIAGNOSIS — M54.16 LUMBAR RADICULOPATHY: ICD-10-CM

## 2022-08-04 DIAGNOSIS — Z79.891 LONG TERM (CURRENT) USE OF OPIATE ANALGESIC: Primary | ICD-10-CM

## 2022-08-04 DIAGNOSIS — M47.816 LUMBAR SPONDYLOSIS: Primary | ICD-10-CM

## 2022-08-04 DIAGNOSIS — M47.816 LUMBAR SPONDYLOSIS: ICD-10-CM

## 2022-08-04 DIAGNOSIS — M47.819 ARTHROPATHY OF FACET JOINT: ICD-10-CM

## 2022-08-04 DIAGNOSIS — Z79.891 LONG TERM (CURRENT) USE OF OPIATE ANALGESIC: ICD-10-CM

## 2022-08-04 PROCEDURE — 3080F DIAST BP >= 90 MM HG: CPT | Performed by: PHYSICIAN ASSISTANT

## 2022-08-04 PROCEDURE — 99214 OFFICE O/P EST MOD 30 MIN: CPT | Performed by: PHYSICIAN ASSISTANT

## 2022-08-04 PROCEDURE — 3075F SYST BP GE 130 - 139MM HG: CPT | Performed by: PHYSICIAN ASSISTANT

## 2022-08-04 RX ORDER — HYDROCODONE BITARTRATE AND ACETAMINOPHEN 5; 325 MG/1; MG/1
1 TABLET ORAL EVERY 8 HOURS PRN
Qty: 90 TABLET | Refills: 0 | Status: SHIPPED | OUTPATIENT
Start: 2022-08-05 | End: 2022-09-04

## 2022-08-04 RX ORDER — HYDROCODONE BITARTRATE AND ACETAMINOPHEN 7.5; 325 MG/1; MG/1
1 TABLET ORAL EVERY 12 HOURS PRN
Qty: 60 TABLET | Refills: 0 | Status: CANCELLED | OUTPATIENT
Start: 2022-08-05 | End: 2022-09-04

## 2022-08-04 NOTE — PROGRESS NOTES
Patient presents in office today with reported pain in low back    Current pain level reported = 7/10    Last reported dose of norco at 3 pm      Narcotic Contract renewal 05/03/21    Urine Drug screen 05/03/21

## 2022-08-04 NOTE — TELEPHONE ENCOUNTER
Notified pain management clinic of denial. Clinic will contact patient with denial and discuss alternative options.

## 2022-08-04 NOTE — TELEPHONE ENCOUNTER
Per Shahnaz You Rendering Provider is not in Network. I am unable to obtain authorization for this referral. Patient does not have Out of Network benefits. They will have to cancel and contact [employer] for the name of an in Network Provider.

## 2022-08-05 RX ORDER — HYDROCODONE BITARTRATE AND ACETAMINOPHEN 10; 325 MG/1; MG/1
1 TABLET ORAL EVERY 12 HOURS PRN
Qty: 60 TABLET | Refills: 0 | OUTPATIENT
Start: 2022-08-05 | End: 2022-09-04

## 2022-08-30 DIAGNOSIS — M47.819 ARTHROPATHY OF FACET JOINT: ICD-10-CM

## 2022-08-30 DIAGNOSIS — M54.16 LUMBAR RADICULOPATHY: ICD-10-CM

## 2022-08-30 DIAGNOSIS — M43.07 LUMBOSACRAL SPONDYLOLYSIS: ICD-10-CM

## 2022-08-30 RX ORDER — GABAPENTIN 300 MG/1
300 CAPSULE ORAL 3 TIMES DAILY
Qty: 90 CAPSULE | Refills: 0 | Status: SHIPPED | OUTPATIENT
Start: 2022-08-30

## 2022-08-30 RX ORDER — HYDROCODONE BITARTRATE AND ACETAMINOPHEN 5; 325 MG/1; MG/1
1 TABLET ORAL 2 TIMES DAILY PRN
Qty: 60 TABLET | Refills: 0 | Status: SHIPPED | OUTPATIENT
Start: 2022-09-04 | End: 2022-10-04

## 2022-08-30 NOTE — TELEPHONE ENCOUNTER
Patient calling to request refill of gabapentin 300 MG Oral Cap  HYDROcodone-acetaminophen 5-325 MG Oral Tab  Pharmacy [address] [place] [address] W [phone], [phone]    Patient informed of 48 hour refill policy excluding weekends and holidays. Informed patient prescription is sent directly to pharmacy. Further explained patient will not receive a call back once prescription is ready.

## 2022-09-02 DIAGNOSIS — E03.9 ACQUIRED HYPOTHYROIDISM: ICD-10-CM

## 2022-09-02 DIAGNOSIS — E11.29 TYPE 2 DIABETES MELLITUS WITH MICROALBUMINURIA, WITHOUT LONG-TERM CURRENT USE OF INSULIN (HCC): ICD-10-CM

## 2022-09-02 DIAGNOSIS — R80.9 TYPE 2 DIABETES MELLITUS WITH MICROALBUMINURIA, WITHOUT LONG-TERM CURRENT USE OF INSULIN (HCC): ICD-10-CM

## 2022-09-02 DIAGNOSIS — E78.2 MIXED HYPERLIPIDEMIA: ICD-10-CM

## 2022-09-02 RX ORDER — LEVOTHYROXINE SODIUM 112 UG/1
112 TABLET ORAL
Qty: 90 TABLET | Refills: 0 | Status: SHIPPED | OUTPATIENT
Start: 2022-09-02

## 2022-09-02 RX ORDER — ATORVASTATIN CALCIUM 40 MG/1
40 TABLET, FILM COATED ORAL NIGHTLY
Qty: 90 TABLET | Refills: 0 | Status: SHIPPED | OUTPATIENT
Start: 2022-09-02

## 2022-09-02 NOTE — TELEPHONE ENCOUNTER
Last time medication was refilled 6/16/22   Quantity and # of refills  90 tabs w/0 refills   Last OV  9/20/21  Next OV none scheduled  Labs: 3/8/22

## 2022-10-03 DIAGNOSIS — M47.816 LUMBAR SPONDYLOSIS: ICD-10-CM

## 2022-10-03 DIAGNOSIS — M47.819 ARTHROPATHY OF FACET JOINT: ICD-10-CM

## 2022-10-03 DIAGNOSIS — M43.07 LUMBOSACRAL SPONDYLOLYSIS: ICD-10-CM

## 2022-10-03 DIAGNOSIS — M54.16 LUMBAR RADICULOPATHY: Primary | ICD-10-CM

## 2022-10-03 DIAGNOSIS — M54.16 LUMBAR RADICULOPATHY: ICD-10-CM

## 2022-10-03 RX ORDER — HYDROCODONE BITARTRATE AND ACETAMINOPHEN 5; 325 MG/1; MG/1
1 TABLET ORAL 2 TIMES DAILY PRN
Qty: 60 TABLET | Refills: 0 | OUTPATIENT
Start: 2022-10-03 | End: 2022-11-02

## 2022-10-03 RX ORDER — HYDROCODONE BITARTRATE AND ACETAMINOPHEN 5; 325 MG/1; MG/1
1 TABLET ORAL 2 TIMES DAILY PRN
Qty: 60 TABLET | Refills: 0 | Status: SHIPPED | OUTPATIENT
Start: 2022-10-04 | End: 2022-11-03

## 2022-10-03 RX ORDER — GABAPENTIN 300 MG/1
300 CAPSULE ORAL 3 TIMES DAILY
Qty: 90 CAPSULE | Refills: 0 | Status: SHIPPED | OUTPATIENT
Start: 2022-10-03

## 2022-10-03 NOTE — TELEPHONE ENCOUNTER
Patient calling to request refill of HYDROcodone-acetaminophen 5-325 MG Oral Tab  gabapentin 300 MG Oral [address] [facility] [phone], [phone]    Patient informed of 48 hour refill policy excluding weekends and holidays. Informed patient prescription is sent directly to pharmacy. Further explained patient will not receive a call back once prescription is ready.

## 2022-11-04 ENCOUNTER — MED REC SCAN ONLY (OUTPATIENT)
Dept: PAIN CLINIC | Facility: CLINIC | Age: 87
End: 2022-11-04

## 2022-11-04 ENCOUNTER — OFFICE VISIT (OUTPATIENT)
Dept: PAIN CLINIC | Facility: CLINIC | Age: 87
End: 2022-11-04
Payer: MEDICARE

## 2022-11-04 VITALS — DIASTOLIC BLOOD PRESSURE: 72 MMHG | SYSTOLIC BLOOD PRESSURE: 122 MMHG | OXYGEN SATURATION: 95 % | HEART RATE: 74 BPM

## 2022-11-04 DIAGNOSIS — M47.816 LUMBAR SPONDYLOSIS: ICD-10-CM

## 2022-11-04 DIAGNOSIS — M43.07 LUMBOSACRAL SPONDYLOLYSIS: ICD-10-CM

## 2022-11-04 DIAGNOSIS — M54.16 LUMBAR RADICULOPATHY: ICD-10-CM

## 2022-11-04 DIAGNOSIS — M47.819 ARTHROPATHY OF FACET JOINT: ICD-10-CM

## 2022-11-04 DIAGNOSIS — I10 ESSENTIAL HYPERTENSION: ICD-10-CM

## 2022-11-04 DIAGNOSIS — Z79.891 CHRONIC PRESCRIPTION OPIATE USE: Primary | ICD-10-CM

## 2022-11-04 PROCEDURE — 99214 OFFICE O/P EST MOD 30 MIN: CPT | Performed by: PHYSICIAN ASSISTANT

## 2022-11-04 PROCEDURE — 3078F DIAST BP <80 MM HG: CPT | Performed by: PHYSICIAN ASSISTANT

## 2022-11-04 PROCEDURE — 3074F SYST BP LT 130 MM HG: CPT | Performed by: PHYSICIAN ASSISTANT

## 2022-11-04 RX ORDER — HYDROCODONE BITARTRATE AND ACETAMINOPHEN 5; 325 MG/1; MG/1
1 TABLET ORAL 2 TIMES DAILY PRN
Qty: 60 TABLET | Refills: 0 | Status: SHIPPED | OUTPATIENT
Start: 2022-11-04 | End: 2022-12-04

## 2022-11-04 RX ORDER — ACETAMINOPHEN 160 MG/1
160 BAR, CHEWABLE ORAL DAILY PRN
COMMUNITY

## 2022-11-04 RX ORDER — GABAPENTIN 300 MG/1
300 CAPSULE ORAL 3 TIMES DAILY
Qty: 90 CAPSULE | Refills: 0 | Status: SHIPPED | OUTPATIENT
Start: 2022-11-04

## 2022-11-04 RX ORDER — VALSARTAN 80 MG/1
TABLET ORAL
Qty: 90 TABLET | Refills: 0 | Status: SHIPPED | OUTPATIENT
Start: 2022-11-04

## 2022-11-04 NOTE — PROGRESS NOTES
Patient presents in office today with reported pain in left side hip radiating down to knee, back    Current pain level reported = 7-8/10    Last reported dose of norco 8am, gabapentin 7am      Narcotic Contract renewal 5/3/21, will update today    Urine Drug screen 5/3/21

## 2022-11-04 NOTE — TELEPHONE ENCOUNTER
Medication: GABAPENTIN 300 MG Oral Cap    Date of last refill: 10/3/22    Last office visit: 11/4/22  Due back to clinic per last office note:  n/a  Date next office visit scheduled:  2/3/23      Last OV note recommendation:   unsigned

## 2022-11-05 ENCOUNTER — LAB ENCOUNTER (OUTPATIENT)
Dept: LAB | Age: 87
End: 2022-11-05
Attending: NURSE PRACTITIONER
Payer: MEDICARE

## 2022-11-05 ENCOUNTER — OFFICE VISIT (OUTPATIENT)
Dept: INTERNAL MEDICINE CLINIC | Facility: CLINIC | Age: 87
End: 2022-11-05
Payer: MEDICARE

## 2022-11-05 VITALS
HEART RATE: 75 BPM | DIASTOLIC BLOOD PRESSURE: 82 MMHG | OXYGEN SATURATION: 98 % | RESPIRATION RATE: 16 BRPM | TEMPERATURE: 99 F | WEIGHT: 155 LBS | SYSTOLIC BLOOD PRESSURE: 132 MMHG | BODY MASS INDEX: 29.27 KG/M2 | HEIGHT: 61 IN

## 2022-11-05 DIAGNOSIS — E03.9 ACQUIRED HYPOTHYROIDISM: Primary | ICD-10-CM

## 2022-11-05 DIAGNOSIS — H61.22 IMPACTED CERUMEN OF LEFT EAR: ICD-10-CM

## 2022-11-05 DIAGNOSIS — E11.29 TYPE 2 DIABETES MELLITUS WITH MICROALBUMINURIA, WITHOUT LONG-TERM CURRENT USE OF INSULIN (HCC): ICD-10-CM

## 2022-11-05 DIAGNOSIS — R80.9 TYPE 2 DIABETES MELLITUS WITH MICROALBUMINURIA, WITHOUT LONG-TERM CURRENT USE OF INSULIN (HCC): ICD-10-CM

## 2022-11-05 DIAGNOSIS — Z86.73 HISTORY OF STROKE: ICD-10-CM

## 2022-11-05 DIAGNOSIS — Z23 NEED FOR INFLUENZA VACCINATION: ICD-10-CM

## 2022-11-05 DIAGNOSIS — Z79.891 CHRONIC PRESCRIPTION OPIATE USE: ICD-10-CM

## 2022-11-05 DIAGNOSIS — Z91.81 AT HIGH RISK FOR FALLS: ICD-10-CM

## 2022-11-05 DIAGNOSIS — K21.9 GASTROESOPHAGEAL REFLUX DISEASE, UNSPECIFIED WHETHER ESOPHAGITIS PRESENT: ICD-10-CM

## 2022-11-05 DIAGNOSIS — I65.23 ATHEROSCLEROSIS OF BOTH CAROTID ARTERIES: ICD-10-CM

## 2022-11-05 DIAGNOSIS — E03.9 ACQUIRED HYPOTHYROIDISM: ICD-10-CM

## 2022-11-05 LAB
ALBUMIN SERPL-MCNC: 3.8 G/DL (ref 3.4–5)
ALBUMIN/GLOB SERPL: 1.2 {RATIO} (ref 1–2)
ALP LIVER SERPL-CCNC: 69 U/L
ALT SERPL-CCNC: 25 U/L
ANION GAP SERPL CALC-SCNC: 6 MMOL/L (ref 0–18)
AST SERPL-CCNC: 14 U/L (ref 15–37)
BILIRUB SERPL-MCNC: 0.6 MG/DL (ref 0.1–2)
BUN BLD-MCNC: 12 MG/DL (ref 7–18)
CALCIUM BLD-MCNC: 9.4 MG/DL (ref 8.5–10.1)
CHLORIDE SERPL-SCNC: 108 MMOL/L (ref 98–112)
CO2 SERPL-SCNC: 26 MMOL/L (ref 21–32)
CREAT BLD-MCNC: 0.62 MG/DL
EST. AVERAGE GLUCOSE BLD GHB EST-MCNC: 140 MG/DL (ref 68–126)
FASTING STATUS PATIENT QL REPORTED: YES
GFR SERPLBLD BASED ON 1.73 SQ M-ARVRAT: 87 ML/MIN/1.73M2 (ref 60–?)
GLOBULIN PLAS-MCNC: 3.3 G/DL (ref 2.8–4.4)
GLUCOSE BLD-MCNC: 111 MG/DL (ref 70–99)
HBA1C MFR BLD: 6.5 % (ref ?–5.7)
OSMOLALITY SERPL CALC.SUM OF ELEC: 290 MOSM/KG (ref 275–295)
POTASSIUM SERPL-SCNC: 3.9 MMOL/L (ref 3.5–5.1)
PROT SERPL-MCNC: 7.1 G/DL (ref 6.4–8.2)
SODIUM SERPL-SCNC: 140 MMOL/L (ref 136–145)
TSI SER-ACNC: 0.66 MIU/ML (ref 0.36–3.74)

## 2022-11-05 PROCEDURE — 3079F DIAST BP 80-89 MM HG: CPT | Performed by: NURSE PRACTITIONER

## 2022-11-05 PROCEDURE — 3075F SYST BP GE 130 - 139MM HG: CPT | Performed by: NURSE PRACTITIONER

## 2022-11-05 PROCEDURE — 3008F BODY MASS INDEX DOCD: CPT | Performed by: NURSE PRACTITIONER

## 2022-11-05 PROCEDURE — 36415 COLL VENOUS BLD VENIPUNCTURE: CPT

## 2022-11-05 PROCEDURE — 90662 IIV NO PRSV INCREASED AG IM: CPT | Performed by: NURSE PRACTITIONER

## 2022-11-05 PROCEDURE — 99215 OFFICE O/P EST HI 40 MIN: CPT | Performed by: NURSE PRACTITIONER

## 2022-11-05 PROCEDURE — 84443 ASSAY THYROID STIM HORMONE: CPT

## 2022-11-05 PROCEDURE — 80053 COMPREHEN METABOLIC PANEL: CPT

## 2022-11-05 PROCEDURE — 90732 PPSV23 VACC 2 YRS+ SUBQ/IM: CPT | Performed by: NURSE PRACTITIONER

## 2022-11-05 PROCEDURE — 83036 HEMOGLOBIN GLYCOSYLATED A1C: CPT

## 2022-11-05 PROCEDURE — 1125F AMNT PAIN NOTED PAIN PRSNT: CPT | Performed by: NURSE PRACTITIONER

## 2022-11-05 PROCEDURE — G0009 ADMIN PNEUMOCOCCAL VACCINE: HCPCS | Performed by: NURSE PRACTITIONER

## 2022-11-05 PROCEDURE — 80307 DRUG TEST PRSMV CHEM ANLYZR: CPT

## 2022-11-05 PROCEDURE — G0008 ADMIN INFLUENZA VIRUS VAC: HCPCS | Performed by: NURSE PRACTITIONER

## 2022-11-05 RX ORDER — OMEPRAZOLE 40 MG/1
40 CAPSULE, DELAYED RELEASE ORAL DAILY
Qty: 90 CAPSULE | Refills: 3 | Status: SHIPPED | OUTPATIENT
Start: 2022-11-05 | End: 2023-10-31

## 2022-11-09 LAB
6-ACETYLMORHINE CUTOFF 20 NG/ML: NOT DETECTED
7-AMINOCLONAZEPAM 40 NG/ML: NOT DETECTED
A-OH-ALPRAZOLM CUTOFF 20 NG/ML: NOT DETECTED
ALPHA-OH-MIDAZOLAM (CUTOFF 20 NG/ML): NOT DETECTED
ALPRAZOLAM CUTOFF 40 NG/ML: NOT DETECTED
AMPHETAMINE CUTOFF 10 NG/ML: NOT DETECTED
BARBITURATES CUTOFF 200 NG/ML: NOT DETECTED
BENZOYLECGONINE  150 NG/ML: NOT DETECTED
BUPRENORPHINE CUTOFF 5 NG/ML: NOT DETECTED
CARISOPRODOL CUTOFF 100 NG/ML: NOT DETECTED
CODINE CUTOFF 40 NG/ML: NOT DETECTED
COLNAZEPAM CUTOFF 20 NG/ML: NOT DETECTED
CREATININE,URINE: 66.5 MG/DL
DIAZEPAM CUTOFF 50 NG/ML: NOT DETECTED
ETHYL-GLUCURONIDE 500 NG/ML: NOT DETECTED
FENTANYL CUTOFF 2 NG/ML: NOT DETECTED
GABAPENTIN (CUTOFF 100 NG/ML): PRESENT
HYDROCODONE CUTOFF 40 NG/ML: PRESENT
HYDROMORPHONE CUTOFF 40 NG/ML: PRESENT
LORAZEPAM CUTOFF 60 NG/ML: NOT DETECTED
MARIJUANA CUTOFF 20 NG/ML: NOT DETECTED
MDA CUTOFF 200 NG/ML: NOT DETECTED
MDEA EVE CUTOFF 200 NG/ML: NOT DETECTED
MDMA-ECSTASY CUTOFF 200 NG/ML: NOT DETECTED
MEPERIDINE MET CUTOFF 50 NG/ML: NOT DETECTED
METHADONE CUTOFF 150 NG/ML: NOT DETECTED
METHAMPHETMN CUTOFF 400 NG/ML: NOT DETECTED
METHYLPHENIDATE (CUTOFF 100 NG/ML): NOT DETECTED
MIDAZOLAM CUTOFF 20 NG/ML: NOT DETECTED
MORHINE CUTOFF 20 NG/ML: NOT DETECTED
NALOXONE (CUTOFF 100 NG/ML): NOT DETECTED
NORBUPRENORPHINE  20 NG/ML: NOT DETECTED
NORDIAZEPAM CUTOFF 50 NG/ML: NOT DETECTED
NORFENTANYL CUTOFF 2 NG/ML: NOT DETECTED
NORHYDRCODONE CUTOFF 100 NG/ML: PRESENT
NOROXYCODONE CUTOFF 100 NG/ML: NOT DETECTED
NOROXYMORPHNE CUTOFF 100 NG/ML: NOT DETECTED
OXAZEPAM CUTOFF 50 NG/ML: NOT DETECTED
OXYCODONE CUTOFF 40 NG/ML: NOT DETECTED
OXYMORPHONE CUTOFF 40 NG/ML: NOT DETECTED
PCP CUTOFF 25 NG/ML: NOT DETECTED
PHENTERMINE CUTOFF 100 NG/ML: NOT DETECTED
PREGABALIN (CUTOFF 100 NG/ML): NOT DETECTED
TAPENTADOL CUTOFF 100 NG/ML: NOT DETECTED
TAPENTADOL-O SULF 200 NG/ML: NOT DETECTED
TEMAZEPAM CUTOFF 50 NG/ML: NOT DETECTED
TRAMADOL CUTOFF 200 NG/ML: NOT DETECTED
ZOLPIDEM CUTOFF 20 NG/ML: NOT DETECTED
ZOLPIDEM METABOLITE (CUTOFF 100 NG/ML): NOT DETECTED

## 2022-11-14 ENCOUNTER — TELEPHONE (OUTPATIENT)
Dept: INTERNAL MEDICINE CLINIC | Facility: CLINIC | Age: 87
End: 2022-11-14

## 2022-11-14 DIAGNOSIS — Z86.73 HISTORY OF STROKE: Primary | ICD-10-CM

## 2022-11-14 DIAGNOSIS — I65.23 ATHEROSCLEROSIS OF BOTH CAROTID ARTERIES: ICD-10-CM

## 2022-11-14 NOTE — TELEPHONE ENCOUNTER
Per referral dept this has been authorized however pt to make sure that the imaging facility is within network. STEPHEN w/ Lilo Lee. No identifiers on VM so I did not leave a detailed message. Upon return call please advise Lilo Lee of this info.

## 2022-11-14 NOTE — TELEPHONE ENCOUNTER
$$$ cost to pt for [facility]    Would like new referral to another facility    Carotid doppler ultrasound-order date 11/5/22    Provider's Name: Amilcar Go [facility]-[id number]    Provider's Specialty:     Reason for Visit: ultrasound     Diagnosis/Reason: History of stroke, Atherosclerosis of both carotid arteries     Number of Visits Requested:     Last Visit with Specialist:     Is Appt. Already Scheduled:         If so, Date:     Once referral is approved pt can see referral via [address]    Fax: [phone]

## 2022-12-05 DIAGNOSIS — M54.16 LUMBAR RADICULOPATHY: ICD-10-CM

## 2022-12-05 DIAGNOSIS — M43.07 LUMBOSACRAL SPONDYLOLYSIS: ICD-10-CM

## 2022-12-05 DIAGNOSIS — M47.816 LUMBAR SPONDYLOSIS: ICD-10-CM

## 2022-12-05 DIAGNOSIS — M47.819 ARTHROPATHY OF FACET JOINT: ICD-10-CM

## 2022-12-05 RX ORDER — HYDROCODONE BITARTRATE AND ACETAMINOPHEN 5; 325 MG/1; MG/1
1 TABLET ORAL 2 TIMES DAILY PRN
Qty: 60 TABLET | Refills: 0 | Status: SHIPPED | OUTPATIENT
Start: 2022-12-05 | End: 2023-01-04

## 2022-12-05 RX ORDER — GABAPENTIN 300 MG/1
300 CAPSULE ORAL 3 TIMES DAILY
Qty: 90 CAPSULE | Refills: 0 | Status: SHIPPED | OUTPATIENT
Start: 2022-12-05

## 2022-12-05 NOTE — TELEPHONE ENCOUNTER
Patient calling to request refill of GABAPENTIN 300 MG Oral Cap  HYDROcodone-acetaminophen 5-325 MG Oral Tab    Pharmacy   [address] [facility] [phone], [phone]   [address] [address]   Phone: [phone] Fax: [phone]         Patient informed of 48 hour refill policy excluding weekends and holidays. Informed patient prescription is sent directly to pharmacy. Further explained patient will not receive a call back once prescription is ready.

## 2022-12-06 DIAGNOSIS — R80.9 TYPE 2 DIABETES MELLITUS WITH MICROALBUMINURIA, WITHOUT LONG-TERM CURRENT USE OF INSULIN (HCC): ICD-10-CM

## 2022-12-06 DIAGNOSIS — E03.9 ACQUIRED HYPOTHYROIDISM: ICD-10-CM

## 2022-12-06 DIAGNOSIS — E11.29 TYPE 2 DIABETES MELLITUS WITH MICROALBUMINURIA, WITHOUT LONG-TERM CURRENT USE OF INSULIN (HCC): ICD-10-CM

## 2022-12-06 DIAGNOSIS — E78.2 MIXED HYPERLIPIDEMIA: ICD-10-CM

## 2022-12-06 RX ORDER — LEVOTHYROXINE SODIUM 112 UG/1
112 TABLET ORAL
Qty: 90 TABLET | Refills: 0 | Status: SHIPPED | OUTPATIENT
Start: 2022-12-06

## 2022-12-06 RX ORDER — ATORVASTATIN CALCIUM 40 MG/1
40 TABLET, FILM COATED ORAL NIGHTLY
Qty: 90 TABLET | Refills: 0 | Status: SHIPPED | OUTPATIENT
Start: 2022-12-06

## 2022-12-19 ENCOUNTER — TELEPHONE (OUTPATIENT)
Dept: INTERNAL MEDICINE CLINIC | Facility: CLINIC | Age: 87
End: 2022-12-19

## 2023-01-05 ENCOUNTER — TELEPHONE (OUTPATIENT)
Dept: PAIN CLINIC | Facility: CLINIC | Age: 88
End: 2023-01-05

## 2023-01-05 DIAGNOSIS — M43.07 LUMBOSACRAL SPONDYLOLYSIS: ICD-10-CM

## 2023-01-05 DIAGNOSIS — M47.816 LUMBAR SPONDYLOSIS: ICD-10-CM

## 2023-01-05 DIAGNOSIS — M47.819 ARTHROPATHY OF FACET JOINT: ICD-10-CM

## 2023-01-05 DIAGNOSIS — M54.16 LUMBAR RADICULOPATHY: ICD-10-CM

## 2023-01-05 RX ORDER — GABAPENTIN 300 MG/1
300 CAPSULE ORAL 3 TIMES DAILY
Qty: 90 CAPSULE | Refills: 0 | Status: SHIPPED | OUTPATIENT
Start: 2023-01-05

## 2023-01-05 RX ORDER — HYDROCODONE BITARTRATE AND ACETAMINOPHEN 5; 325 MG/1; MG/1
1 TABLET ORAL 2 TIMES DAILY PRN
Qty: 60 TABLET | Refills: 0 | OUTPATIENT
Start: 2023-01-05 | End: 2023-02-04

## 2023-01-05 RX ORDER — HYDROCODONE BITARTRATE AND ACETAMINOPHEN 5; 325 MG/1; MG/1
1 TABLET ORAL 2 TIMES DAILY PRN
Qty: 60 TABLET | Refills: 0 | Status: SHIPPED | OUTPATIENT
Start: 2023-01-05 | End: 2023-02-04

## 2023-01-05 NOTE — TELEPHONE ENCOUNTER
Patient calling to request refill of HYDROcodone-acetaminophen 5-325 MG Oral Tab  &   gabapentin 300 MG Oral [address] [phone], [phone]    Patient informed of 48 hour refill policy excluding weekends and holidays. Informed patient prescription is sent directly to pharmacy. Further explained patient will not receive a call back once prescription is ready.

## 2023-01-05 NOTE — TELEPHONE ENCOUNTER
Patient calling to request refill of gabapentin 300 MG Oral Cap    HYDROcodone-acetaminophen 5-325 MG Oral Tab    Pharmacy   [address] [facility] [phone], [phone] [address] [address]   Phone: [phone] Fax: [phone]         Patient informed of 48 hour refill policy excluding weekends and holidays. Informed patient prescription is sent directly to pharmacy. Further explained patient will not receive a call back once prescription is ready.

## 2023-01-05 NOTE — TELEPHONE ENCOUNTER
Contacted pt at this time and notified that her scripts were sent to her pharmacy. advised to call the pharmacy and check with them if her medications are ready for . Pt Mina. Pt is very appreciative of the call.

## 2023-01-06 NOTE — TELEPHONE ENCOUNTER
No return call. Letter mailed to home with results. Carotid artery ultrasound   Results:  B/l carotid atherosclerosis, continue control of CAD risk factors - no smoking, good blood pressure control, lipid control    Letter mailed to home with results and recommendations.

## 2023-01-12 ENCOUNTER — TELEPHONE (OUTPATIENT)
Dept: INTERNAL MEDICINE CLINIC | Facility: CLINIC | Age: 88
End: 2023-01-12

## 2023-01-12 NOTE — TELEPHONE ENCOUNTER
A representative from [facility] called to see if we could change the co-pay for 11/5/2022 visit with Gui Mendes to a regular office visit because it is coming up as a specialist. Sent the message to Stephy Martinez so we can fix it when she returns next week.

## 2023-02-02 ENCOUNTER — TELEPHONE (OUTPATIENT)
Dept: ADMINISTRATIVE | Age: 88
End: 2023-02-02

## 2023-02-02 DIAGNOSIS — M54.50 LUMBAR PAIN: Primary | ICD-10-CM

## 2023-02-02 DIAGNOSIS — M25.59 PAIN IN OTHER JOINT: ICD-10-CM

## 2023-02-06 ENCOUNTER — OFFICE VISIT (OUTPATIENT)
Dept: PAIN CLINIC | Facility: CLINIC | Age: 88
End: 2023-02-06
Payer: MEDICARE

## 2023-02-06 VITALS — SYSTOLIC BLOOD PRESSURE: 140 MMHG | OXYGEN SATURATION: 97 % | DIASTOLIC BLOOD PRESSURE: 90 MMHG | HEART RATE: 90 BPM

## 2023-02-06 DIAGNOSIS — Z79.891 LONG TERM (CURRENT) USE OF OPIATE ANALGESIC: ICD-10-CM

## 2023-02-06 DIAGNOSIS — I10 ESSENTIAL HYPERTENSION: ICD-10-CM

## 2023-02-06 DIAGNOSIS — M47.816 LUMBAR SPONDYLOSIS: Primary | ICD-10-CM

## 2023-02-06 DIAGNOSIS — M47.816 LUMBAR SPONDYLOSIS: ICD-10-CM

## 2023-02-06 DIAGNOSIS — M54.16 LUMBAR RADICULOPATHY: ICD-10-CM

## 2023-02-06 DIAGNOSIS — M47.819 ARTHROPATHY OF FACET JOINT: ICD-10-CM

## 2023-02-06 DIAGNOSIS — M43.07 LUMBOSACRAL SPONDYLOLYSIS: ICD-10-CM

## 2023-02-06 PROCEDURE — 3077F SYST BP >= 140 MM HG: CPT | Performed by: ANESTHESIOLOGY

## 2023-02-06 PROCEDURE — 3080F DIAST BP >= 90 MM HG: CPT | Performed by: ANESTHESIOLOGY

## 2023-02-06 PROCEDURE — 99214 OFFICE O/P EST MOD 30 MIN: CPT | Performed by: ANESTHESIOLOGY

## 2023-02-06 RX ORDER — GABAPENTIN 300 MG/1
300 CAPSULE ORAL 3 TIMES DAILY
Qty: 90 CAPSULE | Refills: 0 | Status: SHIPPED | OUTPATIENT
Start: 2023-02-06

## 2023-02-06 RX ORDER — VALSARTAN 80 MG/1
TABLET ORAL
Qty: 90 TABLET | Refills: 0 | Status: SHIPPED | OUTPATIENT
Start: 2023-02-06

## 2023-02-06 RX ORDER — HYDROCODONE BITARTRATE AND ACETAMINOPHEN 5; 325 MG/1; MG/1
1 TABLET ORAL EVERY 6 HOURS PRN
COMMUNITY
End: 2023-02-06

## 2023-02-06 RX ORDER — HYDROCODONE BITARTRATE AND ACETAMINOPHEN 5; 325 MG/1; MG/1
1 TABLET ORAL EVERY 8 HOURS PRN
Qty: 90 TABLET | Refills: 0 | Status: SHIPPED | OUTPATIENT
Start: 2023-02-06

## 2023-02-06 RX ORDER — HYDROCODONE BITARTRATE AND ACETAMINOPHEN 5; 325 MG/1; MG/1
1 TABLET ORAL 2 TIMES DAILY PRN
Qty: 60 TABLET | Refills: 0 | Status: SHIPPED | OUTPATIENT
Start: 2023-02-06 | End: 2023-03-08

## 2023-02-06 NOTE — PROGRESS NOTES
Patient presents in office today with reported pain in low back radiating into bilateral legs     Current pain level reported =10 /10    Last reported dose of nothing      Narcotic Contract renewal na    Urine Drug screen na

## 2023-02-06 NOTE — TELEPHONE ENCOUNTER
Patient calling to request refill of   gabapentin 300 MG Oral Cap  &  HYDROcodone-acetaminophen 5-325 MG Oral Tab   Pharmacy [address] [facility] [phone], [phone]    Patient informed of 48 hour refill policy excluding weekends and holidays. Informed patient prescription is sent directly to pharmacy. Further explained patient will not receive a call back once prescription is ready.

## 2023-02-28 DIAGNOSIS — M43.07 LUMBOSACRAL SPONDYLOLYSIS: ICD-10-CM

## 2023-02-28 DIAGNOSIS — M54.16 LUMBAR RADICULOPATHY: ICD-10-CM

## 2023-02-28 DIAGNOSIS — M47.819 ARTHROPATHY OF FACET JOINT: ICD-10-CM

## 2023-02-28 NOTE — TELEPHONE ENCOUNTER
Patient calling to request refill of gabapentin 300 MG Oral Cap  HYDROcodone-acetaminophen 5-325 MG Oral Tab    Pharmacy   [address] [facility] [phone], [phone] [address] [facility] [address] [address]       Patient informed of 48 hour refill policy excluding weekends and holidays. Informed patient prescription is sent directly to pharmacy. Further explained patient will not receive a call back once prescription is ready.

## 2023-03-01 RX ORDER — GABAPENTIN 300 MG/1
300 CAPSULE ORAL 3 TIMES DAILY
Qty: 90 CAPSULE | Refills: 0 | Status: SHIPPED | OUTPATIENT
Start: 2023-03-01

## 2023-03-01 RX ORDER — HYDROCODONE BITARTRATE AND ACETAMINOPHEN 5; 325 MG/1; MG/1
1 TABLET ORAL EVERY 8 HOURS PRN
Qty: 90 TABLET | Refills: 0 | Status: SHIPPED | OUTPATIENT
Start: 2023-03-08 | End: 2023-04-07

## 2023-04-11 DIAGNOSIS — M43.07 LUMBOSACRAL SPONDYLOLYSIS: ICD-10-CM

## 2023-04-11 DIAGNOSIS — M54.16 LUMBAR RADICULOPATHY: ICD-10-CM

## 2023-04-11 DIAGNOSIS — M47.819 ARTHROPATHY OF FACET JOINT: ICD-10-CM

## 2023-04-11 NOTE — TELEPHONE ENCOUNTER
Patient calling to request refill of  HYDROcodone-acetaminophen 5-325 MG Oral Tab   gabapentin 300 MG Oral [address] [facility] [address]. [phone], [phone]   [address] [facility] [address] [facility]       Patient informed of 48 hour refill policy excluding weekends and holidays. Informed patient prescription is sent directly to pharmacy. Further explained patient will not receive a call back once prescription is ready.

## 2023-04-11 NOTE — TELEPHONE ENCOUNTER
Pharmacy calling in checking on the status of when this medication will be sent over. Pharmacy is stating they will be closed tomorrow if we could please send this over today if possible. Please advise.

## 2023-04-12 RX ORDER — HYDROCODONE BITARTRATE AND ACETAMINOPHEN 5; 325 MG/1; MG/1
1 TABLET ORAL EVERY 8 HOURS PRN
Qty: 90 TABLET | Refills: 0 | OUTPATIENT
Start: 2023-04-12 | End: 2023-05-12

## 2023-04-12 RX ORDER — HYDROCODONE BITARTRATE AND ACETAMINOPHEN 5; 325 MG/1; MG/1
1 TABLET ORAL EVERY 8 HOURS PRN
Qty: 90 TABLET | Refills: 0 | Status: SHIPPED | OUTPATIENT
Start: 2023-04-12 | End: 2023-05-12

## 2023-04-12 RX ORDER — GABAPENTIN 300 MG/1
300 CAPSULE ORAL 3 TIMES DAILY
Qty: 90 CAPSULE | Refills: 0 | Status: SHIPPED | OUTPATIENT
Start: 2023-04-12

## 2023-04-12 RX ORDER — GABAPENTIN 300 MG/1
300 CAPSULE ORAL 3 TIMES DAILY
Qty: 90 CAPSULE | Refills: 0 | OUTPATIENT
Start: 2023-04-12

## 2023-05-01 ENCOUNTER — OFFICE VISIT (OUTPATIENT)
Dept: PAIN CLINIC | Facility: CLINIC | Age: 88
End: 2023-05-01
Payer: MEDICARE

## 2023-05-01 VITALS — SYSTOLIC BLOOD PRESSURE: 148 MMHG | DIASTOLIC BLOOD PRESSURE: 72 MMHG | HEART RATE: 66 BPM | OXYGEN SATURATION: 98 %

## 2023-05-01 DIAGNOSIS — Z79.891 LONG TERM (CURRENT) USE OF OPIATE ANALGESIC: Primary | ICD-10-CM

## 2023-05-01 DIAGNOSIS — L60.8 DEFORMITY OF NAIL BED: ICD-10-CM

## 2023-05-01 PROCEDURE — 99214 OFFICE O/P EST MOD 30 MIN: CPT | Performed by: ANESTHESIOLOGY

## 2023-05-01 PROCEDURE — 3077F SYST BP >= 140 MM HG: CPT | Performed by: ANESTHESIOLOGY

## 2023-05-01 PROCEDURE — 3078F DIAST BP <80 MM HG: CPT | Performed by: ANESTHESIOLOGY

## 2023-05-03 ENCOUNTER — LAB ENCOUNTER (OUTPATIENT)
Dept: LAB | Age: 88
End: 2023-05-03
Attending: ANESTHESIOLOGY
Payer: MEDICARE

## 2023-05-03 DIAGNOSIS — Z79.891 LONG TERM (CURRENT) USE OF OPIATE ANALGESIC: ICD-10-CM

## 2023-05-03 PROCEDURE — 80359 METHYLENEDIOXYAMPHETAMINES: CPT

## 2023-05-03 PROCEDURE — 80360 METHYLPHENIDATE: CPT

## 2023-05-03 PROCEDURE — 80358 DRUG SCREENING METHADONE: CPT

## 2023-05-03 PROCEDURE — 80334 ANTIDEPRESSANTS CLASS 6/MORE: CPT

## 2023-05-03 PROCEDURE — 80372 DRUG SCREENING TAPENTADOL: CPT

## 2023-05-03 PROCEDURE — 80355 GABAPENTIN NON-BLOOD: CPT

## 2023-05-03 PROCEDURE — 80364 OPIOID &OPIATE ANALOG 5/MORE: CPT

## 2023-05-03 PROCEDURE — 80347 BENZODIAZEPINES 13 OR MORE: CPT

## 2023-05-03 PROCEDURE — 80370 SKEL MUSC RELAXANT 3 OR MORE: CPT

## 2023-05-03 PROCEDURE — 80338 ANTIDEPRESSANT NOT SPECIFIED: CPT

## 2023-05-03 PROCEDURE — 80326 AMPHETAMINES 5 OR MORE: CPT

## 2023-05-03 PROCEDURE — 80371 STIMULANTS SYNTHETIC: CPT

## 2023-05-03 PROCEDURE — 80367 DRUG SCREENING PROPOXYPHENE: CPT

## 2023-05-03 PROCEDURE — 80361 OPIATES 1 OR MORE: CPT

## 2023-05-03 PROCEDURE — 83992 ASSAY FOR PHENCYCLIDINE: CPT

## 2023-05-03 PROCEDURE — 80353 DRUG SCREENING COCAINE: CPT

## 2023-05-03 PROCEDURE — 80307 DRUG TEST PRSMV CHEM ANLYZR: CPT

## 2023-05-03 PROCEDURE — 80348 DRUG SCREENING BUPRENORPHINE: CPT

## 2023-05-03 PROCEDURE — 80366 DRUG SCREENING PREGABALIN: CPT

## 2023-05-03 PROCEDURE — 80357 KETAMINE AND NORKETAMINE: CPT

## 2023-05-03 PROCEDURE — 80341 ANTIEPILEPTICS NOS 7/MORE: CPT

## 2023-05-03 PROCEDURE — 80344 ANTIPSYCHOTICS NOS 7/MORE: CPT

## 2023-05-03 PROCEDURE — 80377 DRUG/SUBSTANCE NOS 7/MORE: CPT

## 2023-05-03 PROCEDURE — 80373 DRUG SCREENING TRAMADOL: CPT

## 2023-05-03 PROCEDURE — 80368 SEDATIVE HYPNOTICS: CPT

## 2023-05-03 PROCEDURE — 80331 ANALGESICS NON-OPIOID 6/MORE: CPT

## 2023-05-08 DIAGNOSIS — M43.07 LUMBOSACRAL SPONDYLOLYSIS: ICD-10-CM

## 2023-05-08 DIAGNOSIS — M47.819 ARTHROPATHY OF FACET JOINT: ICD-10-CM

## 2023-05-08 DIAGNOSIS — M54.16 LUMBAR RADICULOPATHY: ICD-10-CM

## 2023-05-08 RX ORDER — GABAPENTIN 300 MG/1
300 CAPSULE ORAL 3 TIMES DAILY
Qty: 90 CAPSULE | Refills: 0 | Status: SHIPPED | OUTPATIENT
Start: 2023-05-08

## 2023-05-08 RX ORDER — HYDROCODONE BITARTRATE AND ACETAMINOPHEN 5; 325 MG/1; MG/1
1 TABLET ORAL EVERY 8 HOURS PRN
Qty: 90 TABLET | Refills: 0 | OUTPATIENT
Start: 2023-05-08 | End: 2023-06-07

## 2023-05-08 RX ORDER — GABAPENTIN 300 MG/1
300 CAPSULE ORAL 3 TIMES DAILY
Qty: 90 CAPSULE | Refills: 0 | OUTPATIENT
Start: 2023-05-08

## 2023-05-08 RX ORDER — HYDROCODONE BITARTRATE AND ACETAMINOPHEN 5; 325 MG/1; MG/1
1 TABLET ORAL EVERY 8 HOURS PRN
Qty: 90 TABLET | Refills: 0 | Status: SHIPPED | OUTPATIENT
Start: 2023-05-12 | End: 2023-06-11

## 2023-05-08 NOTE — TELEPHONE ENCOUNTER
Medication: gabapentin 300 MG Oral Cap    Date of last refill: 4/12/23      Medication: HYDROcodone-acetaminophen 5-325 MG Oral Tab    Date of last refill: 4/12/23  [place] to verify[place]    Last office visit: 5/1/23  Due back to clinic per last office note:  n/a  Date next office visit scheduled:  8/2/23    Last URINE Screen: 5/3/23  Screen Results:  Active-in progress      Narcotic Contract EXPIRATION date: 11/4/23    Last OV note recommendation:   The patient is an 61-year-old female with a history of lumbar radiculopathy and axial low back pain. This is failed injection therapy. Patient has been managing this primarily through the opiate medications. Has been stable on hydrocodone 3 times daily. Is due for repeat urine drug screen which was ordered. States medication usage discussed with patient again. Narcotic medication reviewed again. Patient complains of discoloration and pain in the great toe and nailbed. Offered referral to podiatry.

## 2023-05-08 NOTE — TELEPHONE ENCOUNTER
Patient calling to request refill of HYDROcodone-acetaminophen 5-325 MG Oral Tab  gabapentin 300 MG Oral [address] [facility] [address]. [phone], [phone]   [facility] [address]   Phone: [phone] Fax: [phone]         Patient informed of 48 hour refill policy excluding weekends and holidays. Informed patient prescription is sent directly to pharmacy. Further explained patient will not receive a call back once prescription is ready.

## 2023-05-18 ENCOUNTER — TELEPHONE (OUTPATIENT)
Dept: ADMINISTRATIVE | Age: 88
End: 2023-05-18

## 2023-05-18 DIAGNOSIS — L60.8 DEFORMITY OF NAIL BED: Primary | ICD-10-CM

## 2023-05-31 ENCOUNTER — HOSPITAL ENCOUNTER (OUTPATIENT)
Dept: GENERAL RADIOLOGY | Age: 88
Discharge: HOME | End: 2023-05-31
Attending: PODIATRIST
Payer: MEDICARE

## 2023-05-31 ENCOUNTER — OFFICE VISIT (OUTPATIENT)
Dept: ORTHOPEDICS CLINIC | Facility: CLINIC | Age: 88
End: 2023-05-31
Payer: MEDICARE

## 2023-05-31 VITALS — BODY MASS INDEX: 31.25 KG/M2 | WEIGHT: 155 LBS | HEIGHT: 59 IN

## 2023-05-31 DIAGNOSIS — R80.9 TYPE 2 DIABETES MELLITUS WITH MICROALBUMINURIA, WITHOUT LONG-TERM CURRENT USE OF INSULIN (HCC): ICD-10-CM

## 2023-05-31 DIAGNOSIS — E11.29 TYPE 2 DIABETES MELLITUS WITH MICROALBUMINURIA, WITHOUT LONG-TERM CURRENT USE OF INSULIN (HCC): ICD-10-CM

## 2023-05-31 DIAGNOSIS — R26.9 GAIT ABNORMALITY: ICD-10-CM

## 2023-05-31 DIAGNOSIS — M19.079 ARTHRITIS, MIDFOOT: Primary | ICD-10-CM

## 2023-05-31 DIAGNOSIS — M20.42 HAMMER TOES OF BOTH FEET: ICD-10-CM

## 2023-05-31 DIAGNOSIS — Z91.81 AT HIGH RISK FOR FALLS: ICD-10-CM

## 2023-05-31 DIAGNOSIS — M20.41 HAMMER TOES OF BOTH FEET: ICD-10-CM

## 2023-05-31 DIAGNOSIS — Z86.73 HISTORY OF STROKE: ICD-10-CM

## 2023-05-31 DIAGNOSIS — M47.816 LUMBAR SPONDYLOSIS: ICD-10-CM

## 2023-05-31 DIAGNOSIS — M79.672 ARCH PAIN OF LEFT FOOT: ICD-10-CM

## 2023-05-31 DIAGNOSIS — M79.672 LEFT FOOT PAIN: ICD-10-CM

## 2023-05-31 PROCEDURE — 3008F BODY MASS INDEX DOCD: CPT | Performed by: PODIATRIST

## 2023-05-31 PROCEDURE — 1160F RVW MEDS BY RX/DR IN RCRD: CPT | Performed by: PODIATRIST

## 2023-05-31 PROCEDURE — 1159F MED LIST DOCD IN RCRD: CPT | Performed by: PODIATRIST

## 2023-05-31 PROCEDURE — 73630 X-RAY EXAM OF FOOT: CPT | Performed by: PODIATRIST

## 2023-05-31 PROCEDURE — 99203 OFFICE O/P NEW LOW 30 MIN: CPT | Performed by: PODIATRIST

## 2023-05-31 PROCEDURE — 1125F AMNT PAIN NOTED PAIN PRSNT: CPT | Performed by: PODIATRIST

## 2023-06-01 ENCOUNTER — TELEPHONE (OUTPATIENT)
Dept: PHYSICAL THERAPY | Facility: HOSPITAL | Age: 88
End: 2023-06-01

## 2023-06-06 DIAGNOSIS — R80.9 TYPE 2 DIABETES MELLITUS WITH MICROALBUMINURIA, WITHOUT LONG-TERM CURRENT USE OF INSULIN (HCC): ICD-10-CM

## 2023-06-06 DIAGNOSIS — M43.07 LUMBOSACRAL SPONDYLOLYSIS: ICD-10-CM

## 2023-06-06 DIAGNOSIS — E78.2 MIXED HYPERLIPIDEMIA: ICD-10-CM

## 2023-06-06 DIAGNOSIS — I10 ESSENTIAL HYPERTENSION: ICD-10-CM

## 2023-06-06 DIAGNOSIS — M47.819 ARTHROPATHY OF FACET JOINT: ICD-10-CM

## 2023-06-06 DIAGNOSIS — E11.29 TYPE 2 DIABETES MELLITUS WITH MICROALBUMINURIA, WITHOUT LONG-TERM CURRENT USE OF INSULIN (HCC): ICD-10-CM

## 2023-06-06 DIAGNOSIS — M54.16 LUMBAR RADICULOPATHY: ICD-10-CM

## 2023-06-07 RX ORDER — ATORVASTATIN CALCIUM 40 MG/1
40 TABLET, FILM COATED ORAL NIGHTLY
Qty: 90 TABLET | Refills: 0 | Status: SHIPPED | OUTPATIENT
Start: 2023-06-07

## 2023-06-07 RX ORDER — HYDROCODONE BITARTRATE AND ACETAMINOPHEN 5; 325 MG/1; MG/1
1 TABLET ORAL EVERY 8 HOURS PRN
Qty: 90 TABLET | Refills: 0 | Status: SHIPPED | OUTPATIENT
Start: 2023-06-07 | End: 2023-07-07

## 2023-06-07 RX ORDER — GABAPENTIN 300 MG/1
300 CAPSULE ORAL 3 TIMES DAILY
Qty: 90 CAPSULE | Refills: 0 | Status: SHIPPED | OUTPATIENT
Start: 2023-06-07

## 2023-06-07 NOTE — TELEPHONE ENCOUNTER
Last time medication was refilled 02/06/2023  Quantity and # of refills 90 w/ 0   Last OV 11/05/2022  Next OV Pt was asked to Follow Up in 3 months ( 02/05/2023) No Future Appointments. Medication failed protocol.  to coordinate appt with pt.      Sent to [facility] for approval.

## 2023-06-07 NOTE — TELEPHONE ENCOUNTER
Medication: gabapentin 300 MG Oral Cap    Date of last refill: 5/8/23      Medication: HYDROcodone-acetaminophen 5-325 MG Oral Tab    Date of last refill: 5/8/23  [place]05    Last office visit: 5/1/23  Due back to clinic per last office note:  n/a  Date next office visit scheduled:  8/2/23    Last URINE Screen: 5/3/23  Screen Results:  See in chart    Narcotic Contract EXPIRATION date: 11/4/22    Last OV note recommendation:   The patient is an 51-year-old female with a history of lumbar radiculopathy and axial low back pain. This is failed injection therapy. Patient has been managing this primarily through the opiate medications. Has been stable on hydrocodone 3 times daily. Is due for repeat urine drug screen which was ordered. States medication usage discussed with patient again. Narcotic medication reviewed again. Patient complains of discoloration and pain in the great toe and nailbed. Offered referral to podiatry.

## 2023-06-07 NOTE — TELEPHONE ENCOUNTER
Last time medication was refilled 03/09/2023  Quantity and # of refills 90 w/ 0   Last OV 11/05/2022  Next OV No Future Appointments    Medication failed protocol, last Lipid was completed on 03/08/2022.     Sent to [facility] for approval.

## 2023-06-07 NOTE — TELEPHONE ENCOUNTER
Called patient and advised time to schedule appointment for follow up. Patient states she will check with her daughter who is her transportation and will call back tomorrow to schedule appointment.

## 2023-06-19 NOTE — TELEPHONE ENCOUNTER
**RN NEEDED**      Script for HYDROcodone-acetaminophen  MG Oral Tab ready for .
ID verified. NB#[id number]. Lab \"collected\".  Gave pt script
Patient informed of 48 hour refill policy excluding weekends and holidays. Informed patient only patient can  the prescription effective April 1, 2017. Further explained patient will not receive a call back once prescription is ready.
Please document last dose pain medication, have pt supply urine specimen prior to Rx pickup. Thank you. Prescription printed.
Pt here in office for Rx pickup. Narcotic agreement already on file. States last dose of Norco was today at approx 0800. Pt sent to lab for urine specimen. Pt to return to pickup Rx.
36.1

## 2023-07-03 DIAGNOSIS — M47.819 ARTHROPATHY OF FACET JOINT: ICD-10-CM

## 2023-07-03 DIAGNOSIS — M54.16 LUMBAR RADICULOPATHY: ICD-10-CM

## 2023-07-03 DIAGNOSIS — M43.07 LUMBOSACRAL SPONDYLOLYSIS: ICD-10-CM

## 2023-07-03 RX ORDER — HYDROCODONE BITARTRATE AND ACETAMINOPHEN 5; 325 MG/1; MG/1
1 TABLET ORAL EVERY 8 HOURS PRN
Qty: 90 TABLET | Refills: 0 | Status: SHIPPED | OUTPATIENT
Start: 2023-07-03 | End: 2023-08-02

## 2023-07-03 RX ORDER — GABAPENTIN 300 MG/1
300 CAPSULE ORAL 3 TIMES DAILY
Qty: 90 CAPSULE | Refills: 0 | Status: SHIPPED | OUTPATIENT
Start: 2023-07-03

## 2023-07-07 RX ORDER — VALSARTAN 80 MG/1
80 TABLET ORAL DAILY
Qty: 30 TABLET | Refills: 0 | Status: SHIPPED | OUTPATIENT
Start: 2023-07-07

## 2023-08-02 ENCOUNTER — OFFICE VISIT (OUTPATIENT)
Dept: PAIN CLINIC | Facility: CLINIC | Age: 88
End: 2023-08-02
Payer: MEDICARE

## 2023-08-02 VITALS — OXYGEN SATURATION: 92 % | SYSTOLIC BLOOD PRESSURE: 150 MMHG | HEART RATE: 75 BPM | DIASTOLIC BLOOD PRESSURE: 70 MMHG

## 2023-08-02 DIAGNOSIS — M47.819 ARTHROPATHY OF FACET JOINT: ICD-10-CM

## 2023-08-02 DIAGNOSIS — M51.36 DDD (DEGENERATIVE DISC DISEASE), LUMBAR: Primary | ICD-10-CM

## 2023-08-02 DIAGNOSIS — M54.16 LUMBAR RADICULOPATHY: ICD-10-CM

## 2023-08-02 DIAGNOSIS — M47.816 LUMBAR SPONDYLOSIS: ICD-10-CM

## 2023-08-02 DIAGNOSIS — M43.07 LUMBOSACRAL SPONDYLOLYSIS: ICD-10-CM

## 2023-08-02 PROCEDURE — 99215 OFFICE O/P EST HI 40 MIN: CPT | Performed by: ANESTHESIOLOGY

## 2023-08-02 PROCEDURE — 1159F MED LIST DOCD IN RCRD: CPT | Performed by: ANESTHESIOLOGY

## 2023-08-02 PROCEDURE — 3078F DIAST BP <80 MM HG: CPT | Performed by: ANESTHESIOLOGY

## 2023-08-02 PROCEDURE — 3077F SYST BP >= 140 MM HG: CPT | Performed by: ANESTHESIOLOGY

## 2023-08-02 RX ORDER — HYDROCORTISONE ACETATE 0.5 %
1500 CREAM (GRAM) TOPICAL DAILY
COMMUNITY

## 2023-08-02 RX ORDER — GABAPENTIN 300 MG/1
300 CAPSULE ORAL 3 TIMES DAILY
Qty: 90 CAPSULE | Refills: 0 | Status: SHIPPED | OUTPATIENT
Start: 2023-08-02

## 2023-08-02 RX ORDER — HYDROCODONE BITARTRATE AND ACETAMINOPHEN 5; 325 MG/1; MG/1
1 TABLET ORAL EVERY 8 HOURS PRN
Qty: 90 TABLET | Refills: 0 | Status: SHIPPED | OUTPATIENT
Start: 2023-08-02 | End: 2023-09-01

## 2023-08-02 NOTE — TELEPHONE ENCOUNTER
Medication: gabapentin 300 MG Oral Cap     Date of last refill: 7/3/23      Medication: HYDROcodone-acetaminophen 5-325 MG Oral Tab     Date of last refill: 7/3/23  [place]90    Last office visit: 8/2/23  Due back to clinic per last office note:  n/a  Date next office visit scheduled:  11/3/23    Last URINE Screen: 5/3/23  Screen Results:  see in chart      Narcotic Contract EXPIRATION date: 11/4/23    Last OV note recommendation: The patient is a 72-year-old female who presents today for follow-up. Has a longstanding history of chronic pain which has been managed with hydrocodone. States medications continue provide improved function and analgesia. I reviewed her [place] drug [place] and [place] and this is consistent with hydrocodone usage and prescribing. Had a lengthy discussion with her regarding her chronic pain issues with regard to possible treatment options and patient expectations. Given her longstanding history of pain, she has been in pain behaviors which include some degree of learned helplessness. This may be difficult to overcome given her lengthy history of chronic pain and age along with language barrier. Was very clear that we would not be increasing her pain medication dosage or frequency. From interventional standpoint, patient has had multiple lumbar facet injections in the past which has helped to some degree. Discussed role for intralaminar epidural steroid injection. Additionally, will order x-ray of the lumbar spine.

## 2023-08-02 NOTE — PROGRESS NOTES
Name: Babak Marcos   : 1935   DOS: 2023     Pain Clinic Follow Up Visit:   Patient presents with: Follow - Up: 3 month f/u      Babak Marcos is a 82year old female with a longstanding history of chronic pain who presents today for follow-up. From symptom standpoint, patient complains of axial low back pain with radicular symptoms into both legs. Was recently seen by Dr. Shakir Coronel and recommended for physical therapy. Additionally, patient is managed with hydrocodone. States the medication does provide improved function and analgesia. Pt denies any chills, fever, or weakness. There is no bladder or bowel incontinence associated with the pain. REVIEW OF SYSTEMS:  A ten point review of systems was performed with pertinent positives and negatives in the HPI. Iodine (Topical)        ANAPHYLAXIS    Comment:Shortness of breath. Penicillin V            ANAPHYLAXIS  Penicillins             RASH    Comment:REPORTS ASTHMA ATTACK WITH PCN  Radiology Contrast *    SHORTNESS OF BREATH  Sulfa Antibiotics       RASH    Comment:Other reaction(s): Hives/Urticaria  Meloxicam               NAUSEA ONLY    Comment:Had GI upset    Current Outpatient Medications   Medication Sig Dispense Refill    Glucosamine-Chondroit-Vit C-Mn (GLUCOSAMINE 1500 COMPLEX) Oral Cap Take 1,500 mg by mouth daily. Cholecalciferol 125 MCG (5000 UT) Oral Tab Take 1 tablet (5,000 Units total) by mouth daily. vitamin E 100 UNITS Oral Cap Take 1 capsule (100 Units total) by mouth daily. valsartan 80 MG Oral Tab Take 1 tablet (80 mg total) by mouth daily. 30 tablet 0    gabapentin 300 MG Oral Cap Take 1 capsule (300 mg total) by mouth 3 (three) times daily. 90 capsule 0    HYDROcodone-acetaminophen 5-325 MG Oral Tab Take 1 tablet by mouth every 8 (eight) hours as needed for Pain. 90 tablet 0    ATORVASTATIN 40 MG Oral Tab Take 1 tablet (40 mg total) by mouth nightly.  90 tablet 0    LEVOTHYROXINE 112 MCG Oral Tab Take 1 tablet (112 mcg total) by mouth before breakfast. 90 tablet 2    Omeprazole 40 MG Oral Capsule Delayed Release Take 1 capsule (40 mg total) by mouth in the morning. 90 capsule 3    acetaminophen 160 MG Oral Chew Tab Chew 1 tablet (160 mg total) by mouth daily as needed for Pain. MONTELUKAST 10 MG Oral Tab TAKE 1 TABLET BY MOUTH DAILY 90 tablet 0    Albuterol Sulfate  (90 Base) MCG/ACT Inhalation Aero Soln Inhale 1 puff into the lungs every 6 (six) hours as needed for Wheezing. EXAM:   /70 (BP Location: Left arm, Patient Position: Sitting, Cuff Size: adult)   Pulse 75   SpO2 92%   General:  Patient is a(n) 82year old year old female in no acute distress. Neurologic[de-identified] WNL-Orientation to time, place and person, normal mood & affect, concentration & attention span intact. Inspection:  Ambulates with well-coordinated, fluid, non-antalgic gait. Gait is normal.  Neck: Full range of motion  Back: Gait is intact. Does have minor tenderness palpation lumbar paravertebral musculature  Cranial nerve: Grossly intact  Respiratory: Nonlabored    IMAGES:     MRI reviewed with the patient with evidence of multilevel lumbar degenerative disc disease with spondylosis and facet arthropathy. ASSESSMENT AND PLAN:   DDD (degenerative disc disease), lumbar  (primary encounter diagnosis)  Lumbar spondylosis    The patient is a 79-year-old female who presents today for follow-up. Has a longstanding history of chronic pain which has been managed with hydrocodone. States medications continue provide improved function and analgesia. I reviewed her recent urine drug screen and [place]- and this is consistent with hydrocodone usage and prescribing. Had a lengthy discussion with her regarding her chronic pain issues with regard to possible treatment options and patient expectations.   Given her longstanding history of pain, she has been in pain behaviors which include some degree of learned helplessness. This may be difficult to overcome given her lengthy history of chronic pain and age along with language barrier. Was very clear that we would not be increasing her pain medication dosage or frequency. From interventional standpoint, patient has had multiple lumbar facet injections in the past which has helped to some degree. Discussed role for intralaminar epidural steroid injection. Additionally, will order x-ray of the lumbar spine. Orders:Orders Placed This Encounter      [address] [facility]        Radiology orders and consultations:XR LUMBAR SPINE (MIN 4 VIEWS) (CPT=72110)  The patient indicates understanding of these issues and agrees to the plan. No follow-ups on file.     Monik Hutchinson MD, 8/2/2023, 9:35 AM

## 2023-08-02 NOTE — TELEPHONE ENCOUNTER
Patient calling to request refill of   HYDROcodone-acetaminophen 5-325 MG Oral Tab       gabapentin 300 MG Oral [address] [phone], [phone] [address] [facility] [address] [address]       Patient informed of 48 hour refill policy excluding weekends and holidays. Informed patient prescription is sent directly to pharmacy. Further explained patient will not receive a call back once prescription is ready.

## 2023-08-02 NOTE — PROGRESS NOTES
Patient presents in office today with reported pain in not in much pain currently but when she experiences pain it is in low back, legs, toes    Current pain level reported = 4/10    Last reported dose of norco 2 hours ago      Narcotic Contract renewal 11/4/22    Urine Drug screen 5/3/23

## 2023-08-10 ENCOUNTER — HOSPITAL ENCOUNTER (OUTPATIENT)
Dept: GENERAL RADIOLOGY | Age: 88
Discharge: HOME | End: 2023-08-10
Attending: ANESTHESIOLOGY
Payer: MEDICARE

## 2023-08-10 ENCOUNTER — TELEPHONE (OUTPATIENT)
Dept: INTERNAL MEDICINE CLINIC | Facility: CLINIC | Age: 88
End: 2023-08-10

## 2023-08-10 DIAGNOSIS — M51.36 DDD (DEGENERATIVE DISC DISEASE), LUMBAR: ICD-10-CM

## 2023-08-10 PROCEDURE — 72110 X-RAY EXAM L-2 SPINE 4/>VWS: CPT | Performed by: ANESTHESIOLOGY

## 2023-08-10 NOTE — TELEPHONE ENCOUNTER
Unable to reach patient for medication adherence consult via [address] [facility]  [place]#[id number]. LVM for patient to call me back.

## 2023-08-11 NOTE — TELEPHONE ENCOUNTER
Unable to reach patient x2 via [facility]  [place]#[id number]. Will close encounter.

## 2023-09-06 DIAGNOSIS — M47.819 ARTHROPATHY OF FACET JOINT: ICD-10-CM

## 2023-09-06 DIAGNOSIS — M43.07 LUMBOSACRAL SPONDYLOLYSIS: ICD-10-CM

## 2023-09-06 DIAGNOSIS — M54.16 LUMBAR RADICULOPATHY: ICD-10-CM

## 2023-09-06 RX ORDER — HYDROCODONE BITARTRATE AND ACETAMINOPHEN 5; 325 MG/1; MG/1
1 TABLET ORAL EVERY 8 HOURS PRN
Qty: 90 TABLET | Refills: 0 | Status: SHIPPED | OUTPATIENT
Start: 2023-09-06 | End: 2023-10-06

## 2023-09-06 RX ORDER — GABAPENTIN 300 MG/1
300 CAPSULE ORAL 3 TIMES DAILY
Qty: 90 CAPSULE | Refills: 0 | Status: SHIPPED | OUTPATIENT
Start: 2023-09-06

## 2023-09-06 NOTE — TELEPHONE ENCOUNTER
Patient calling to request refill of   HYDROcodone-acetaminophen 5-325 MG Oral Tab    &  gabapentin 300 MG Oral [address] [address] [phone], [phone]     Patient informed of 48 hour refill policy excluding weekends and holidays. Informed patient prescription is sent directly to pharmacy. Further explained patient will not receive a call back once prescription is ready.

## 2023-09-06 NOTE — TELEPHONE ENCOUNTER
Medication:   gabapentin 300 MG Oral Cap   Date of last refill: 23    Medication:   HYDROcodone-acetaminophen 5-325 MG Oral Tab ()   Date of last refill: 23  [place] 72    Last office visit: 23  Due back to clinic per last office note:  FU in 3M  Date next office visit scheduled:  11/3/23    Last URINE Screen: 5/10/23  Screen Results:  [address] date: 23    Last OV note recommendation:   ASSESSMENT AND PLAN:   DDD (degenerative disc disease), lumbar  (primary encounter diagnosis)  Lumbar spondylosis     The patient is a 81-year-old female who presents today for follow-up. Has a longstanding history of chronic pain which has been managed with hydrocodone. States medications continue provide improved function and analgesia. I reviewed her recent urine drug screen and [place]- and this is consistent with hydrocodone usage and prescribing. Had a lengthy discussion with her regarding her chronic pain issues with regard to possible treatment options and patient expectations. Given her longstanding history of pain, she has been in pain behaviors which include some degree of learned helplessness. This may be difficult to overcome given her lengthy history of chronic pain and age along with language barrier. Was very clear that we would not be increasing her pain medication dosage or frequency. From interventional standpoint, patient has had multiple lumbar facet injections in the past which has helped to some degree. Discussed role for intralaminar epidural steroid injection. Additionally, will order x-ray of the lumbar spine. Orders:Orders Placed This Encounter      [address]            Radiology orders and consultations:XR LUMBAR SPINE (MIN 4 VIEWS) (CPT=72110)  The patient indicates understanding of these issues and agrees to the plan. No follow-ups on file.      Inés Jimenez MD, 2023, 9:35 AM

## 2023-10-02 ENCOUNTER — OFFICE VISIT (OUTPATIENT)
Dept: INTERNAL MEDICINE CLINIC | Facility: CLINIC | Age: 88
End: 2023-10-02
Payer: MEDICARE

## 2023-10-02 ENCOUNTER — LAB ENCOUNTER (OUTPATIENT)
Dept: LAB | Age: 88
End: 2023-10-02
Attending: NURSE PRACTITIONER
Payer: MEDICARE

## 2023-10-02 VITALS
HEART RATE: 88 BPM | WEIGHT: 149 LBS | BODY MASS INDEX: 30.04 KG/M2 | RESPIRATION RATE: 16 BRPM | DIASTOLIC BLOOD PRESSURE: 78 MMHG | OXYGEN SATURATION: 96 % | HEIGHT: 59 IN | TEMPERATURE: 98 F | SYSTOLIC BLOOD PRESSURE: 132 MMHG

## 2023-10-02 DIAGNOSIS — R80.9 TYPE 2 DIABETES MELLITUS WITH MICROALBUMINURIA, WITHOUT LONG-TERM CURRENT USE OF INSULIN: ICD-10-CM

## 2023-10-02 DIAGNOSIS — E03.9 ACQUIRED HYPOTHYROIDISM: ICD-10-CM

## 2023-10-02 DIAGNOSIS — I10 ESSENTIAL HYPERTENSION: Primary | ICD-10-CM

## 2023-10-02 DIAGNOSIS — H61.22 IMPACTED CERUMEN OF LEFT EAR: ICD-10-CM

## 2023-10-02 DIAGNOSIS — E78.2 MIXED HYPERLIPIDEMIA: ICD-10-CM

## 2023-10-02 DIAGNOSIS — J45.30 MILD PERSISTENT ASTHMA WITHOUT COMPLICATION: ICD-10-CM

## 2023-10-02 DIAGNOSIS — I10 ESSENTIAL HYPERTENSION: ICD-10-CM

## 2023-10-02 DIAGNOSIS — E11.29 TYPE 2 DIABETES MELLITUS WITH MICROALBUMINURIA, WITHOUT LONG-TERM CURRENT USE OF INSULIN: ICD-10-CM

## 2023-10-02 DIAGNOSIS — Z23 NEED FOR INFLUENZA VACCINATION: ICD-10-CM

## 2023-10-02 DIAGNOSIS — N89.8 VAGINAL DISCHARGE: ICD-10-CM

## 2023-10-02 LAB
ALBUMIN SERPL-MCNC: 3.8 G/DL (ref 3.4–5)
ALBUMIN/GLOB SERPL: 1.1 {RATIO} (ref 1–2)
ALP LIVER SERPL-CCNC: 86 U/L
ALT SERPL-CCNC: 23 U/L
ANION GAP SERPL CALC-SCNC: 8 MMOL/L (ref 0–18)
AST SERPL-CCNC: 16 U/L (ref 15–37)
BASOPHILS # BLD AUTO: 0.04 X10(3) UL (ref 0–0.2)
BASOPHILS NFR BLD AUTO: 0.8 %
BILIRUB SERPL-MCNC: 0.4 MG/DL (ref 0.1–2)
BILIRUB UR QL STRIP.AUTO: NEGATIVE
BUN BLD-MCNC: 16 MG/DL (ref 7–18)
CALCIUM BLD-MCNC: 9.2 MG/DL (ref 8.5–10.1)
CHLORIDE SERPL-SCNC: 109 MMOL/L (ref 98–112)
CHOLEST SERPL-MCNC: 108 MG/DL (ref ?–200)
CLARITY UR REFRACT.AUTO: CLEAR
CO2 SERPL-SCNC: 25 MMOL/L (ref 21–32)
CREAT BLD-MCNC: 0.76 MG/DL
CREAT UR-SCNC: 44.9 MG/DL
EGFRCR SERPLBLD CKD-EPI 2021: 76 ML/MIN/1.73M2 (ref 60–?)
EOSINOPHIL # BLD AUTO: 0.14 X10(3) UL (ref 0–0.7)
EOSINOPHIL NFR BLD AUTO: 2.7 %
ERYTHROCYTE [DISTWIDTH] IN BLOOD BY AUTOMATED COUNT: 12.7 %
EST. AVERAGE GLUCOSE BLD GHB EST-MCNC: 137 MG/DL (ref 68–126)
FASTING PATIENT LIPID ANSWER: YES
FASTING STATUS PATIENT QL REPORTED: YES
GLOBULIN PLAS-MCNC: 3.4 G/DL (ref 2.8–4.4)
GLUCOSE BLD-MCNC: 99 MG/DL (ref 70–99)
GLUCOSE UR STRIP.AUTO-MCNC: NORMAL MG/DL
HBA1C MFR BLD: 6.4 % (ref ?–5.7)
HCT VFR BLD AUTO: 39.2 %
HDLC SERPL-MCNC: 68 MG/DL (ref 40–59)
HGB BLD-MCNC: 12.8 G/DL
IMM GRANULOCYTES # BLD AUTO: 0.01 X10(3) UL (ref 0–1)
IMM GRANULOCYTES NFR BLD: 0.2 %
KETONES UR STRIP.AUTO-MCNC: NEGATIVE MG/DL
LDLC SERPL CALC-MCNC: 28 MG/DL (ref ?–100)
LEUKOCYTE ESTERASE UR QL STRIP.AUTO: 75
LYMPHOCYTES # BLD AUTO: 1.64 X10(3) UL (ref 1–4)
LYMPHOCYTES NFR BLD AUTO: 31.6 %
MCH RBC QN AUTO: 29.2 PG (ref 26–34)
MCHC RBC AUTO-ENTMCNC: 32.7 G/DL (ref 31–37)
MCV RBC AUTO: 89.3 FL
MICROALBUMIN UR-MCNC: 25.7 MG/DL
MICROALBUMIN/CREAT 24H UR-RTO: 572.4 UG/MG (ref ?–30)
MONOCYTES # BLD AUTO: 0.47 X10(3) UL (ref 0.1–1)
MONOCYTES NFR BLD AUTO: 9.1 %
NEUTROPHILS # BLD AUTO: 2.89 X10 (3) UL (ref 1.5–7.7)
NEUTROPHILS # BLD AUTO: 2.89 X10(3) UL (ref 1.5–7.7)
NEUTROPHILS NFR BLD AUTO: 55.6 %
NITRITE UR QL STRIP.AUTO: NEGATIVE
NONHDLC SERPL-MCNC: 40 MG/DL (ref ?–130)
OSMOLALITY SERPL CALC.SUM OF ELEC: 295 MOSM/KG (ref 275–295)
PH UR STRIP.AUTO: 7 [PH] (ref 5–8)
PLATELET # BLD AUTO: 180 10(3)UL (ref 150–450)
POTASSIUM SERPL-SCNC: 3.9 MMOL/L (ref 3.5–5.1)
PROT SERPL-MCNC: 7.2 G/DL (ref 6.4–8.2)
PROT UR STRIP.AUTO-MCNC: 30 MG/DL
RBC # BLD AUTO: 4.39 X10(6)UL
RBC UR QL AUTO: NEGATIVE
SODIUM SERPL-SCNC: 142 MMOL/L (ref 136–145)
SP GR UR STRIP.AUTO: 1.01 (ref 1–1.03)
TRIGL SERPL-MCNC: 52 MG/DL (ref 30–149)
TSI SER-ACNC: 1.21 MIU/ML (ref 0.36–3.74)
UROBILINOGEN UR STRIP.AUTO-MCNC: NORMAL MG/DL
VLDLC SERPL CALC-MCNC: 7 MG/DL (ref 0–30)
WBC # BLD AUTO: 5.2 X10(3) UL (ref 4–11)

## 2023-10-02 PROCEDURE — 1159F MED LIST DOCD IN RCRD: CPT | Performed by: NURSE PRACTITIONER

## 2023-10-02 PROCEDURE — 36415 COLL VENOUS BLD VENIPUNCTURE: CPT

## 2023-10-02 PROCEDURE — 3075F SYST BP GE 130 - 139MM HG: CPT | Performed by: NURSE PRACTITIONER

## 2023-10-02 PROCEDURE — 80053 COMPREHEN METABOLIC PANEL: CPT

## 2023-10-02 PROCEDURE — 85025 COMPLETE CBC W/AUTO DIFF WBC: CPT

## 2023-10-02 PROCEDURE — 3008F BODY MASS INDEX DOCD: CPT | Performed by: NURSE PRACTITIONER

## 2023-10-02 PROCEDURE — 99214 OFFICE O/P EST MOD 30 MIN: CPT | Performed by: NURSE PRACTITIONER

## 2023-10-02 PROCEDURE — 84443 ASSAY THYROID STIM HORMONE: CPT

## 2023-10-02 PROCEDURE — 3078F DIAST BP <80 MM HG: CPT | Performed by: NURSE PRACTITIONER

## 2023-10-02 PROCEDURE — 81001 URINALYSIS AUTO W/SCOPE: CPT

## 2023-10-02 PROCEDURE — 1160F RVW MEDS BY RX/DR IN RCRD: CPT | Performed by: NURSE PRACTITIONER

## 2023-10-02 PROCEDURE — 82043 UR ALBUMIN QUANTITATIVE: CPT

## 2023-10-02 PROCEDURE — 80061 LIPID PANEL: CPT

## 2023-10-02 PROCEDURE — 90662 IIV NO PRSV INCREASED AG IM: CPT | Performed by: NURSE PRACTITIONER

## 2023-10-02 PROCEDURE — 82570 ASSAY OF URINE CREATININE: CPT

## 2023-10-02 PROCEDURE — 83036 HEMOGLOBIN GLYCOSYLATED A1C: CPT

## 2023-10-02 PROCEDURE — G0008 ADMIN INFLUENZA VIRUS VAC: HCPCS | Performed by: NURSE PRACTITIONER

## 2023-10-02 RX ORDER — MONTELUKAST SODIUM 10 MG/1
10 TABLET ORAL DAILY
Qty: 90 TABLET | Refills: 3 | Status: SHIPPED | OUTPATIENT
Start: 2023-10-02 | End: 2024-09-26

## 2023-10-03 DIAGNOSIS — M54.16 LUMBAR RADICULOPATHY: ICD-10-CM

## 2023-10-03 DIAGNOSIS — M47.819 ARTHROPATHY OF FACET JOINT: ICD-10-CM

## 2023-10-03 DIAGNOSIS — M43.07 LUMBOSACRAL SPONDYLOLYSIS: ICD-10-CM

## 2023-10-03 RX ORDER — GABAPENTIN 300 MG/1
300 CAPSULE ORAL 3 TIMES DAILY
Qty: 90 CAPSULE | Refills: 0 | Status: SHIPPED | OUTPATIENT
Start: 2023-10-03

## 2023-10-03 RX ORDER — HYDROCODONE BITARTRATE AND ACETAMINOPHEN 5; 325 MG/1; MG/1
1 TABLET ORAL EVERY 8 HOURS PRN
Qty: 90 TABLET | Refills: 0 | Status: SHIPPED | OUTPATIENT
Start: 2023-10-07 | End: 2023-11-06

## 2023-10-03 NOTE — TELEPHONE ENCOUNTER
Patient calling to request refill of gabapentin 300 MG Oral Cap and HYDROcodone-acetaminophen 5325 [address] [facility] [phone], [phone]     Patient informed of 48 hour refill policy excluding weekends and holidays. Informed patient prescription is sent directly to pharmacy. Further explained patient will not receive a call back once prescription is ready.

## 2023-10-03 NOTE — TELEPHONE ENCOUNTER
Medication: gabapentin 300 MG Oral Cap     Date of last refill: 9/6/23      Medication: HYDROcodone-acetaminophen 5-325 MG Oral Tab     Date of last refill: 9/1/23  [place] 4/3/87    Last office visit: 8/2/23  Due back to clinic per last office note:  n/a  Date next office visit scheduled:  11/3/23    Last URINE Screen: 5/3/23  Screen Results:  see in chart      Narcotic Contract EXPIRATION date: 11/4/23    Last OV note recommendation: The patient is a 82-year-old female who presents today for follow-up. Has a longstanding history of chronic pain which has been managed with hydrocodone. States medications continue provide improved function and analgesia. I reviewed her recent urine drug screen and [place]- and this is consistent with hydrocodone usage and prescribing. Had a lengthy discussion with her regarding her chronic pain issues with regard to possible treatment options and patient expectations. Given her longstanding history of pain, she has been in pain behaviors which include some degree of learned helplessness. This may be difficult to overcome given her lengthy history of chronic pain and age along with language barrier. Was very clear that we would not be increasing her pain medication dosage or frequency. From interventional standpoint, patient has had multiple lumbar facet injections in the past which has helped to some degree. Discussed role for intralaminar epidural steroid injection. Additionally, will order x-ray of the lumbar spine.

## 2023-10-09 DIAGNOSIS — E78.2 MIXED HYPERLIPIDEMIA: ICD-10-CM

## 2023-10-09 DIAGNOSIS — R80.9 TYPE 2 DIABETES MELLITUS WITH MICROALBUMINURIA, WITHOUT LONG-TERM CURRENT USE OF INSULIN: ICD-10-CM

## 2023-10-09 DIAGNOSIS — E11.29 TYPE 2 DIABETES MELLITUS WITH MICROALBUMINURIA, WITHOUT LONG-TERM CURRENT USE OF INSULIN: ICD-10-CM

## 2023-10-09 DIAGNOSIS — I10 ESSENTIAL HYPERTENSION: ICD-10-CM

## 2023-10-09 RX ORDER — ATORVASTATIN CALCIUM 40 MG/1
40 TABLET, FILM COATED ORAL NIGHTLY
Qty: 90 TABLET | Refills: 0 | Status: SHIPPED | OUTPATIENT
Start: 2023-10-09

## 2023-10-09 RX ORDER — VALSARTAN 80 MG/1
80 TABLET ORAL DAILY
Qty: 90 TABLET | Refills: 0 | Status: SHIPPED | OUTPATIENT
Start: 2023-10-09

## 2023-10-09 NOTE — TELEPHONE ENCOUNTER
Last time medication was refilled 07/07/2023  Quantity and # of refills 30 w 0  Last OV 10/02/2023  Next OV 10/20/2023    Passed protocol, Rx sent.

## 2023-10-09 NOTE — TELEPHONE ENCOUNTER
Last time medication was refilled 06/07/2023  Quantity and # of refills 90 w 0  Last OV 10/02/2023  Next OV 10/20/2023    Passed protocol, Rx sent.

## 2023-10-20 ENCOUNTER — OFFICE VISIT (OUTPATIENT)
Dept: INTERNAL MEDICINE CLINIC | Facility: CLINIC | Age: 88
End: 2023-10-20
Payer: MEDICARE

## 2023-10-20 VITALS
DIASTOLIC BLOOD PRESSURE: 80 MMHG | WEIGHT: 150 LBS | OXYGEN SATURATION: 97 % | HEART RATE: 87 BPM | HEIGHT: 59 IN | RESPIRATION RATE: 16 BRPM | SYSTOLIC BLOOD PRESSURE: 130 MMHG | BODY MASS INDEX: 30.24 KG/M2 | TEMPERATURE: 98 F

## 2023-10-20 DIAGNOSIS — E11.29 TYPE 2 DIABETES MELLITUS WITH MICROALBUMINURIA, WITHOUT LONG-TERM CURRENT USE OF INSULIN: ICD-10-CM

## 2023-10-20 DIAGNOSIS — E11.59 HYPERTENSION ASSOCIATED WITH DIABETES: ICD-10-CM

## 2023-10-20 DIAGNOSIS — F32.1 MAJOR DEPRESSIVE DISORDER, SINGLE EPISODE, MODERATE (HCC): ICD-10-CM

## 2023-10-20 DIAGNOSIS — R80.9 TYPE 2 DIABETES MELLITUS WITH MICROALBUMINURIA, WITHOUT LONG-TERM CURRENT USE OF INSULIN: ICD-10-CM

## 2023-10-20 DIAGNOSIS — Z86.73 HISTORY OF STROKE: ICD-10-CM

## 2023-10-20 DIAGNOSIS — Z00.00 ANNUAL PHYSICAL EXAM: Primary | ICD-10-CM

## 2023-10-20 DIAGNOSIS — E03.9 ACQUIRED HYPOTHYROIDISM: ICD-10-CM

## 2023-10-20 DIAGNOSIS — I65.23 ATHEROSCLEROSIS OF BOTH CAROTID ARTERIES: ICD-10-CM

## 2023-10-20 DIAGNOSIS — E78.5 HYPERLIPIDEMIA ASSOCIATED WITH TYPE 2 DIABETES MELLITUS: ICD-10-CM

## 2023-10-20 DIAGNOSIS — J45.30 MILD PERSISTENT ASTHMA WITHOUT COMPLICATION: ICD-10-CM

## 2023-10-20 DIAGNOSIS — Z00.00 ENCOUNTER FOR ANNUAL HEALTH EXAMINATION: ICD-10-CM

## 2023-10-20 DIAGNOSIS — E11.69 HYPERLIPIDEMIA ASSOCIATED WITH TYPE 2 DIABETES MELLITUS: ICD-10-CM

## 2023-10-20 DIAGNOSIS — N81.10 BLADDER PROLAPSE, FEMALE, ACQUIRED: ICD-10-CM

## 2023-10-20 DIAGNOSIS — I15.2 HYPERTENSION ASSOCIATED WITH DIABETES: ICD-10-CM

## 2023-10-20 DIAGNOSIS — K21.9 GASTROESOPHAGEAL REFLUX DISEASE WITHOUT ESOPHAGITIS: ICD-10-CM

## 2023-10-20 PROBLEM — K59.00 CONSTIPATION: Status: RESOLVED | Noted: 2021-05-03 | Resolved: 2023-10-20

## 2023-10-20 PROBLEM — M19.079 ARTHRITIS, MIDFOOT: Status: RESOLVED | Noted: 2023-05-31 | Resolved: 2023-10-20

## 2023-10-20 PROBLEM — M54.12 CERVICAL RADICULAR PAIN: Status: RESOLVED | Noted: 2021-05-03 | Resolved: 2023-10-20

## 2023-10-20 PROBLEM — M47.816 LUMBAR SPONDYLOSIS: Status: RESOLVED | Noted: 2020-02-06 | Resolved: 2023-10-20

## 2023-10-20 RX ORDER — MONTELUKAST SODIUM 10 MG/1
10 TABLET ORAL DAILY
Qty: 90 TABLET | Refills: 3 | Status: SHIPPED | OUTPATIENT
Start: 2023-10-20 | End: 2024-10-14

## 2023-10-23 ENCOUNTER — OFFICE VISIT (OUTPATIENT)
Facility: LOCATION | Age: 88
End: 2023-10-23
Payer: MEDICARE

## 2023-10-23 DIAGNOSIS — H60.392 OTHER INFECTIVE ACUTE OTITIS EXTERNA OF LEFT EAR: ICD-10-CM

## 2023-10-23 DIAGNOSIS — T16.2XXA FB EAR, LEFT, INITIAL ENCOUNTER: Primary | ICD-10-CM

## 2023-10-23 PROCEDURE — 99204 OFFICE O/P NEW MOD 45 MIN: CPT | Performed by: OTOLARYNGOLOGY

## 2023-10-23 PROCEDURE — 1159F MED LIST DOCD IN RCRD: CPT | Performed by: OTOLARYNGOLOGY

## 2023-10-23 PROCEDURE — 1160F RVW MEDS BY RX/DR IN RCRD: CPT | Performed by: OTOLARYNGOLOGY

## 2023-10-23 PROCEDURE — 69200 CLEAR OUTER EAR CANAL: CPT | Performed by: OTOLARYNGOLOGY

## 2023-10-23 RX ORDER — OFLOXACIN 3 MG/ML
5 SOLUTION/ DROPS OPHTHALMIC 2 TIMES DAILY
Qty: 5 ML | Refills: 0 | Status: SHIPPED | OUTPATIENT
Start: 2023-10-23 | End: 2023-10-30

## 2023-10-23 RX ORDER — OFLOXACIN 3 MG/ML
5 SOLUTION/ DROPS OPHTHALMIC 2 TIMES DAILY
Qty: 5 ML | Refills: 0 | Status: SHIPPED | OUTPATIENT
Start: 2023-10-23 | End: 2023-10-23 | Stop reason: CLARIF

## 2023-10-23 NOTE — PROGRESS NOTES
[facility], [facility] [place]    Report of Consultation    Date of Consult: 10/23/2023     Reason for Consultation:   Patient complains of plugging and pain in her left ear. History of Present Illness:   Patient is a 82year old female who is being seen for possible foreign body in her ear. Patient had been using wax removal and other kits to remove debris from her left canal but never has been able to. She thinks something has been in there for several months. Denies fever chills nausea vomiting no other history ear surgery head trauma.     Past Medical History  Past Medical History:   Diagnosis Date    Asthma     Diabetes mellitus ([address]     GERD (gastroesophageal reflux disease)     Hypothyroid     MRSA (methicillin resistant staph aureus) culture positive 07/2016    Infection to left hand/arm    Osteoarthritis     Other and unspecified hyperlipidemia     Type II or unspecified type diabetes mellitus without mention of complication, not stated as uncontrolled     Unspecified essential hypertension        Past Surgical History  Past Surgical History:   Procedure Laterality Date    APPENDECTOMY      CHOLECYSTECTOMY      HYSTERECTOMY  2005    and bl oophorectomy    LUMPECTOMY LEFT      OTHER      bladder lift    OTHER      varicose vein surgery    OTHER  January 2014    bladder mesh repair    OTHER SURGICAL HISTORY  7/10/2014    Procedure: LUMBAR FACET INJECTION;  Surgeon: Concepcion Shepherd MD;  Location: [facility] MAIN OR    OTHER SURGICAL HISTORY  9/2/2014    Procedure: Masood Pham OF THORACIC OR LUMBAR MEDIAL BRANCH;  Surgeon: Concepcion Shepherd MD;  Location: [facility] MAIN OR    OTHER SURGICAL HISTORY  9/18/2014    Procedure: Masood Pham OF THORACIC OR LUMBAR MEDIAL BRANCH;  Surgeon: Concepcion Shepherd MD;  Location: [facility] MAIN OR    OTHER SURGICAL HISTORY Bilateral 3/9/2015    Procedure: SACROILIAC JOINT INJECTION BILATERAL;  Surgeon: Concepcion Shepherd MD;  Location: [facility] MAIN OR    OTHER SURGICAL HISTORY Right 9/3/2015    Procedure: RADIOFREQUENCY ABLATION OF THORACIC OR LUMBAR MEDIAL BRANCH;  Surgeon: Rodrigo Castro MD;  Location: [facility] MAIN OR    OTHER SURGICAL HISTORY Left 9/15/2015    Procedure: Melven Susan OF THORACIC OR LUMBAR MEDIAL BRANCH;  Surgeon: Rodrigo Castro MD;  Location: [facility] MAIN OR    OTHER SURGICAL HISTORY Bilateral 6/20/2016    Procedure: TRANSFORAMINAL LUMBAR EPIDURAL STEROID INJECTION SINGLE LEVEL;  Surgeon: Rodrigo Castro MD;  Location:  MAIN OR    STAB PHLEBECTOMY, VARICOSE VEINS, 1 EXTREMITY; <20 INCISIONS Bilateral 2000    bl vein stripping       Family History  History reviewed. No pertinent family history. Social History  Patient Guardians:  Not on file    Other Topics            Concern  Caffeine Concern        Yes    Comment:1-2 cups daily  Exercise                No    Comment:rare    Social History Narrative    None on file            Current Medications:  Current Outpatient Medications   Medication Sig Dispense Refill    ofloxacin 0.3 % Ophthalmic Solution Place 5 drops in ear(s) in the morning and 5 drops before bedtime. Do all this for 7 days. 5 mL 0    Ascorbic Acid (VITAMIN C OR) Take by mouth.      montelukast (SINGULAIR) 10 MG Oral Tab Take 1 tablet (10 mg total) by mouth daily. 90 tablet 3    valsartan 80 MG Oral Tab Take 1 tablet (80 mg total) by mouth daily. 90 tablet 0    ATORVASTATIN 40 MG Oral Tab Take 1 tablet (40 mg total) by mouth nightly. 90 tablet 0    gabapentin 300 MG Oral Cap Take 1 capsule (300 mg total) by mouth 3 (three) times daily. 90 capsule 0    HYDROcodone-acetaminophen 5-325 MG Oral Tab Take 1 tablet by mouth every 8 (eight) hours as needed for Pain. 90 tablet 0    Glucosamine-Chondroit-Vit C-Mn (GLUCOSAMINE 1500 COMPLEX) Oral Cap Take 1,500 mg by mouth daily. Cholecalciferol 125 MCG (5000 UT) Oral Tab Take 1 tablet (5,000 Units total) by mouth daily.       vitamin E 100 UNITS Oral Cap Take 1 capsule (100 Units total) by mouth daily. LEVOTHYROXINE 112 MCG Oral Tab Take 1 tablet (112 mcg total) by mouth before breakfast. 90 tablet 2    Omeprazole 40 MG Oral Capsule Delayed Release Take 1 capsule (40 mg total) by mouth in the morning. 90 capsule 3    acetaminophen 160 MG Oral Chew Tab Chew 1 tablet (160 mg total) by mouth daily as needed for Pain. Albuterol Sulfate  (90 Base) MCG/ACT Inhalation Aero Soln Inhale 1 puff into the lungs every 6 (six) hours as needed for Wheezing. Allergies    Iodine (Topical)        ANAPHYLAXIS    Comment:Shortness of breath. Penicillin V            ANAPHYLAXIS  Penicillins             RASH    Comment:REPORTS ASTHMA ATTACK WITH PCN  Radiology Contrast *    SHORTNESS OF BREATH  Sulfa Antibiotics       RASH    Comment:Other reaction(s): Hives/Urticaria  Meloxicam               NAUSEA ONLY    Comment:Had GI upset    Review of Systems:   A comprehensive review of systems was negative. Physical Exam:   There were no vitals taken for this visit. Constitutional Normal Overall appearance - Normal.   Psychiatric Normal Orientation - Oriented to time, place, person & situation. Appropriate mood and affect. Head/Face Normal Facial features -- Normal. Skull - Normal.   Eyes Normal Pupils equal ,round ,react to light and accomidate   Ears Normal External Ear Right: Normal, Left: Normal. Canal - Right: Normal, Left: Agreed to the debris is well as a cottonball which had been present in the left canal was removed under microscopic guidance with alligator and suction. Ear canal and TM appears inflamed.    Nose Normal External Nose, Normal, Septum -Midline, Right, Left Turbinates - Right: Normal, Hypertrophic Left: Normal, Hypertrophic   Mouth/Throat Normal Lips/teeth/gums - Normal. Tonsils - Normal. Oropharynx - Normal.   Neck Exam Normal Inspection - Normal. Palpation - Normal. Parotid gland - Normal. Thyroid gland - Normal.   Neurological Normal Memory - Normal. Cranial nerves - Cranial nerves II through XII grossly intact. Nasopharynx Normal  Normal        Skin Normal Inspection - Normal.        Lymph Detail Normal Submental. Submandibular. Anterior cervical. Posterior cervical. Supraclavicular. Results:     Laboratory Data:  Lab Results   Component Value Date    WBC 5.2 10/02/2023    HGB 12.8 10/02/2023    HCT 39.2 10/02/2023    .0 10/02/2023    CREATSERUM 0.76 10/02/2023    BUN 16 10/02/2023     10/02/2023    K 3.9 10/02/2023     10/02/2023    CO2 25.0 10/02/2023    GLU 99 10/02/2023    CA 9.2 10/02/2023    ALB 3.8 10/02/2023    ALKPHO 86 10/02/2023    TP 7.2 10/02/2023    AST 16 10/02/2023    ALT 23 10/02/2023    T4F 1.2 03/08/2022    TSH 1.210 10/02/2023         Imaging:  No results found. Impression:   Foreign body with external canal infection. Recommendations:  Foreign body was removed under microscopic guidance. She was started on Floxin otic for a week and return in 2 weeks for recheck further evaluation. The patient and her daughter understand our treatment plan. Thank you for allowing me to participate in the care of your patient.       Tegan Mayorga MD  10/23/2023

## 2023-10-26 NOTE — TELEPHONE ENCOUNTER
Patient needs to see RN when picking up RX. Daughter made aware patient will need to be present. Rx ready for pickup, at  in [place] office, [address]. none

## 2023-11-03 ENCOUNTER — MED REC SCAN ONLY (OUTPATIENT)
Dept: PAIN CLINIC | Facility: CLINIC | Age: 88
End: 2023-11-03

## 2023-11-03 ENCOUNTER — LAB ENCOUNTER (OUTPATIENT)
Dept: LAB | Age: 88
End: 2023-11-03
Attending: ANESTHESIOLOGY
Payer: MEDICARE

## 2023-11-03 DIAGNOSIS — M47.819 ARTHROPATHY OF FACET JOINT: ICD-10-CM

## 2023-11-03 DIAGNOSIS — M43.07 LUMBOSACRAL SPONDYLOLYSIS: ICD-10-CM

## 2023-11-03 DIAGNOSIS — M54.16 LUMBAR RADICULOPATHY: ICD-10-CM

## 2023-11-03 DIAGNOSIS — F11.90 CHRONIC NARCOTIC USE: ICD-10-CM

## 2023-11-03 PROCEDURE — 80307 DRUG TEST PRSMV CHEM ANLYZR: CPT

## 2023-11-06 RX ORDER — GABAPENTIN 300 MG/1
300 CAPSULE ORAL 3 TIMES DAILY
Qty: 90 CAPSULE | Refills: 0 | Status: SHIPPED | OUTPATIENT
Start: 2023-11-06 | End: 2023-11-06

## 2023-11-06 RX ORDER — GABAPENTIN 300 MG/1
300 CAPSULE ORAL 3 TIMES DAILY
Qty: 90 CAPSULE | Refills: 0 | Status: SHIPPED | OUTPATIENT
Start: 2023-11-06

## 2023-11-06 RX ORDER — HYDROCODONE BITARTRATE AND ACETAMINOPHEN 5; 325 MG/1; MG/1
1 TABLET ORAL EVERY 8 HOURS PRN
Qty: 90 TABLET | Refills: 0 | Status: SHIPPED | OUTPATIENT
Start: 2023-11-06 | End: 2023-12-06

## 2023-11-06 NOTE — TELEPHONE ENCOUNTER
Medication: gabapentin 300 MG Oral Cap     Date of last refill: 10/3/23      Medication: HYDROcodone-acetaminophen 5-325 MG Oral Tab     Date of last refill: 10/7/23  [place] 49/0/19    Last office visit: 11/3/23  Due back to clinic per last office note:  3 months  Date next office visit scheduled:  2/5/24    Last URINE Screen: 5/3/23  Screen Results:  see in chart      Narcotic Contract EXPIRATION date: 11/3/23    Last OV note recommendation: The patient is a 82-year-old female with a longstanding history of chronic back pain. This is managed with hydrocodone. States medications continue to provide analgesia and improved function. Denies any side effects associated with medications. Narcotic contract is up for renewal.  This was updated today. Additionally, UDS was reviewed from May 2023. Repeat urine drug screen was ordered. Patient can follow-up in 3 months.

## 2023-12-05 DIAGNOSIS — M54.16 LUMBAR RADICULOPATHY: ICD-10-CM

## 2023-12-05 DIAGNOSIS — M47.819 ARTHROPATHY OF FACET JOINT: ICD-10-CM

## 2023-12-05 DIAGNOSIS — M43.07 LUMBOSACRAL SPONDYLOLYSIS: ICD-10-CM

## 2023-12-05 RX ORDER — GABAPENTIN 300 MG/1
300 CAPSULE ORAL 3 TIMES DAILY
Qty: 90 CAPSULE | Refills: 0 | Status: SHIPPED | OUTPATIENT
Start: 2023-12-05

## 2023-12-05 RX ORDER — HYDROCODONE BITARTRATE AND ACETAMINOPHEN 5; 325 MG/1; MG/1
1 TABLET ORAL EVERY 8 HOURS PRN
Qty: 90 TABLET | Refills: 0 | Status: SHIPPED | OUTPATIENT
Start: 2023-12-05 | End: 2024-01-04

## 2023-12-05 NOTE — TELEPHONE ENCOUNTER
Patient calling to request refill of HYDROCODONE-ACETAMINOPHEN 5-325 MG Oral Tab AND gabapentin 300 MG Oral [address] [facility] [phone], [phone] [[id number]]     Patient informed of 48 hour refill policy excluding weekends and holidays. Informed patient prescription is sent directly to pharmacy. Further explained patient will not receive a call back once prescription is ready.

## 2023-12-05 NOTE — TELEPHONE ENCOUNTER
Medication:gabapentin 300 MG Oral Cap     Date of last refill: 11/6/23      Medication: HYDROCODONE-ACETAMINOPHEN 5-325 MG Oral Tab     Date of last refill: 11/6/23  [place] 53/5/48    Last office visit: 11/3/23  Due back to clinic per last office note:  3 months  Date next office visit scheduled:  2/5/24    Last URINE Screen: 11/3/23  Screen Results:  see in chart      Narcotic Contract EXPIRATION date: 11/3/23    Last OV note recommendation: The patient is a 32-year-old female with a longstanding history of chronic back pain. This is managed with hydrocodone. States medications continue to provide analgesia and improved function. Denies any side effects associated with medications. Narcotic contract is up for renewal.  This was updated today. Additionally, UDS was reviewed from May 2023. Repeat urine drug screen was ordered. Patient can follow-up in 3 months.

## 2024-01-03 DIAGNOSIS — M47.819 ARTHROPATHY OF FACET JOINT: ICD-10-CM

## 2024-01-03 DIAGNOSIS — M54.16 LUMBAR RADICULOPATHY: ICD-10-CM

## 2024-01-03 DIAGNOSIS — M43.07 LUMBOSACRAL SPONDYLOLYSIS: ICD-10-CM

## 2024-01-04 NOTE — TELEPHONE ENCOUNTER
Medication: gabapentin 300 MG     Date of last refill: 12/05/23        Medication: HYDROcodone-acetaminophen 5-325 MG     Date of last refill: 12/05/23  [place]23    Last office visit: 11/03/23  Due back to clinic per last office note:  3 months  Date next office visit scheduled:  02/05/24    Last URINE Screen: 11/7/23  Screen Results:  pls review in chart      Narcotic Contract EXPIRATION date: 11/03/24    Last OV note recommendation:   ASSESSMENT AND PLAN:   Chronic narcotic use  (primary encounter diagnosis)  Long term (current) use of opiate analgesic     The patient is a 88-year-old female with a longstanding history of chronic back pain.  This is managed with hydrocodone.  States medications continue to provide analgesia and improved function.  Denies any side effects associated with medications.  Narcotic contract is up for renewal.  This was updated today.  Additionally, UDS was reviewed from May 2023.  Repeat urine drug screen was ordered.  Patient can follow-up in 3 months.        Orders:Orders Placed This Encounter      Pain Management Drug Panel, Urine

## 2024-01-05 RX ORDER — HYDROCODONE BITARTRATE AND ACETAMINOPHEN 5; 325 MG/1; MG/1
1 TABLET ORAL EVERY 8 HOURS PRN
Qty: 90 TABLET | Refills: 0 | Status: SHIPPED | OUTPATIENT
Start: 2024-01-05 | End: 2024-02-04

## 2024-01-05 RX ORDER — GABAPENTIN 300 MG/1
300 CAPSULE ORAL 3 TIMES DAILY
Qty: 90 CAPSULE | Refills: 0 | Status: SHIPPED | OUTPATIENT
Start: 2024-01-05

## 2024-01-22 DIAGNOSIS — E11.29 TYPE 2 DIABETES MELLITUS WITH MICROALBUMINURIA, WITHOUT LONG-TERM CURRENT USE OF INSULIN  (HCC): ICD-10-CM

## 2024-01-22 DIAGNOSIS — J45.30 MILD PERSISTENT ASTHMA WITHOUT COMPLICATION: ICD-10-CM

## 2024-01-22 DIAGNOSIS — E03.9 ACQUIRED HYPOTHYROIDISM: ICD-10-CM

## 2024-01-22 DIAGNOSIS — R80.9 TYPE 2 DIABETES MELLITUS WITH MICROALBUMINURIA, WITHOUT LONG-TERM CURRENT USE OF INSULIN  (HCC): ICD-10-CM

## 2024-01-22 DIAGNOSIS — I10 ESSENTIAL HYPERTENSION: ICD-10-CM

## 2024-01-22 DIAGNOSIS — E78.2 MIXED HYPERLIPIDEMIA: ICD-10-CM

## 2024-01-22 RX ORDER — ATORVASTATIN CALCIUM 40 MG/1
40 TABLET, FILM COATED ORAL NIGHTLY
Qty: 90 TABLET | Refills: 0 | Status: SHIPPED | OUTPATIENT
Start: 2024-01-22

## 2024-01-22 RX ORDER — LEVOTHYROXINE SODIUM 112 UG/1
112 TABLET ORAL
Qty: 90 TABLET | Refills: 0 | Status: SHIPPED | OUTPATIENT
Start: 2024-01-22

## 2024-01-22 RX ORDER — VALSARTAN 80 MG/1
80 TABLET ORAL DAILY
Qty: 90 TABLET | Refills: 0 | Status: SHIPPED | OUTPATIENT
Start: 2024-01-22

## 2024-01-22 RX ORDER — MONTELUKAST SODIUM 10 MG/1
10 TABLET ORAL DAILY
Qty: 90 TABLET | Refills: 0 | OUTPATIENT
Start: 2024-01-22 | End: 2025-01-16

## 2024-01-22 NOTE — TELEPHONE ENCOUNTER
Atorvastatin 40 mg  Last time medication was refilled 10/09/23  Quantity and # of refills 90 w 0  Last OV 10/20/23  Next OV No future appts scheduled.     Levothyroxine 112 mcg  Last time medication was refilled 3/09/23  Quantity and # of refills 90 w 2  Last OV 10/20/23  Next OV No future appts scheduled.     Passed protocol, Rx sent.

## 2024-01-22 NOTE — TELEPHONE ENCOUNTER
valsartan 80 MG Oral Tab   Last time medication was refilled 10/09/2023  Quantity and # of refills 90 w 0  Last OV 10/20/2023  Next OV No appointment scheduled      Passed protocol, Rx sent.    montelukast (SINGULAIR) 10 MG Oral Tab   Last time medication was refilled 10/20/2023  Quantity and # of refills 90 w 3  Last OV 10/20/2023  Next OV No appointment scheduled      Too soon for refill, denied    Tazorac Pregnancy And Lactation Text: This medication is not safe during pregnancy. It is unknown if this medication is excreted in breast milk.

## 2024-02-02 ENCOUNTER — TELEPHONE (OUTPATIENT)
Dept: ADMINISTRATIVE | Age: 89
End: 2024-02-02

## 2024-02-02 DIAGNOSIS — M51.36 DDD (DEGENERATIVE DISC DISEASE), LUMBAR: Primary | ICD-10-CM

## 2024-02-02 DIAGNOSIS — M43.06 LUMBAR SPONDYLOLYSIS: ICD-10-CM

## 2024-02-02 NOTE — TELEPHONE ENCOUNTER
Patient's daughter called to request an Urgent referral for her Mother to see Dr Siddiqui -Pain Management Provider. Stated they have an appointment scheduled this Mon 02/05/2024 @ 8am.

## 2024-02-05 ENCOUNTER — OFFICE VISIT (OUTPATIENT)
Dept: PAIN CLINIC | Facility: CLINIC | Age: 89
End: 2024-02-05
Payer: MEDICARE

## 2024-02-05 VITALS — OXYGEN SATURATION: 96 % | SYSTOLIC BLOOD PRESSURE: 150 MMHG | DIASTOLIC BLOOD PRESSURE: 98 MMHG | HEART RATE: 79 BPM

## 2024-02-05 DIAGNOSIS — M43.07 LUMBOSACRAL SPONDYLOLYSIS: ICD-10-CM

## 2024-02-05 DIAGNOSIS — M47.819 ARTHROPATHY OF FACET JOINT: ICD-10-CM

## 2024-02-05 DIAGNOSIS — M51.36 DDD (DEGENERATIVE DISC DISEASE), LUMBAR: ICD-10-CM

## 2024-02-05 DIAGNOSIS — Z79.891 CHRONIC PRESCRIPTION OPIATE USE: ICD-10-CM

## 2024-02-05 DIAGNOSIS — M47.816 LUMBAR SPONDYLOSIS: ICD-10-CM

## 2024-02-05 DIAGNOSIS — F11.90 CHRONIC NARCOTIC USE: ICD-10-CM

## 2024-02-05 DIAGNOSIS — M54.16 LUMBAR RADICULOPATHY: Primary | ICD-10-CM

## 2024-02-05 DIAGNOSIS — Z79.891 LONG TERM (CURRENT) USE OF OPIATE ANALGESIC: ICD-10-CM

## 2024-02-05 PROCEDURE — 3080F DIAST BP >= 90 MM HG: CPT | Performed by: NURSE PRACTITIONER

## 2024-02-05 PROCEDURE — 3077F SYST BP >= 140 MM HG: CPT | Performed by: NURSE PRACTITIONER

## 2024-02-05 PROCEDURE — 1159F MED LIST DOCD IN RCRD: CPT | Performed by: NURSE PRACTITIONER

## 2024-02-05 PROCEDURE — 99214 OFFICE O/P EST MOD 30 MIN: CPT | Performed by: NURSE PRACTITIONER

## 2024-02-05 RX ORDER — HYDROCODONE BITARTRATE AND ACETAMINOPHEN 5; 325 MG/1; MG/1
1 TABLET ORAL EVERY 8 HOURS PRN
Qty: 90 TABLET | Refills: 0 | Status: SHIPPED | OUTPATIENT
Start: 2024-02-05

## 2024-02-05 RX ORDER — OMEPRAZOLE 40 MG/1
CAPSULE, DELAYED RELEASE ORAL
COMMUNITY
Start: 2024-01-22

## 2024-02-05 RX ORDER — GABAPENTIN 300 MG/1
300 CAPSULE ORAL 3 TIMES DAILY
Qty: 90 CAPSULE | Refills: 0 | Status: SHIPPED | OUTPATIENT
Start: 2024-02-05

## 2024-02-05 RX ORDER — OFLOXACIN 3 MG/ML
SOLUTION AURICULAR (OTIC)
COMMUNITY
Start: 2023-10-23

## 2024-02-05 NOTE — PROGRESS NOTES
Patient presents in office today with reported pain in lower back    Current pain level reported = 6/10    Last reported dose of Norco and gabapentin around 630a      Narcotic Contract renewal 11/3/24    Urine Drug screen 11/3/23

## 2024-02-05 NOTE — PROGRESS NOTES
HPI:   Dana Iraheta is a 88 year old female with a history of chronic low back pain here for medication review.  From a symptom standpoint, complains of pain across the back which is deep and aching.  This is managed with hydrocodone.  Patient states hydrocodone continues to provide improved functional ability and analgesia.         Dana Iraheta presents with complaints of leg pain Left > right/she also has pain in her low back and her neck.    Patient was last seen 11/2023 by     The pain is described as moderate aching, stabbing, grabbing, shooting, soreness that is constant .  The patient’s activity level has decreased since last visit.  The pain is worst unrelated to time of day.    Changes in condition/history since last visit: [place] and she is concerned    The following activities will increase the patient’s pain: walking, standing, lifting, climbing stairs, household chores, outside yard work, staying in the same position for prolonged periods of time, any prolonged activity    The following activities decrease the patient’s pain: medications, lying down    Functional Assessment: Patient reports that they are able to complete all of their ADL's such as eating, bathing, using the toilet, dressing and getting up from a bed or a chair independently.    Current Medications:  Current Outpatient Medications   Medication Sig Dispense Refill    ofloxacin 0.3 % Otic Solution       Omeprazole 40 MG Oral Capsule Delayed Release       LEVOTHYROXINE 112 MCG Oral Tab Take 1 tablet (112 mcg total) by mouth before breakfast. 90 tablet 0    ATORVASTATIN 40 MG Oral Tab Take 1 tablet (40 mg total) by mouth nightly. 90 tablet 0    VALSARTAN 80 MG Oral Tab Take 1 tablet (80 mg total) by mouth daily. 90 tablet 0    gabapentin 300 MG Oral Cap Take 1 capsule (300 mg total) by mouth 3 (three) times daily. 90 capsule 0    Ascorbic Acid (VITAMIN C OR) Take by mouth.      montelukast  (SINGULAIR) 10 MG Oral Tab Take 1 tablet (10 mg total) by mouth daily. 90 tablet 3    Glucosamine-Chondroit-Vit C-Mn (GLUCOSAMINE 1500 COMPLEX) Oral Cap Take 1,500 mg by mouth daily.      Cholecalciferol 125 MCG (5000 UT) Oral Tab Take 1 tablet (5,000 Units total) by mouth daily.      vitamin E 100 UNITS Oral Cap Take 1 capsule (100 Units total) by mouth daily.      acetaminophen 160 MG Oral Chew Tab Chew 1 tablet (160 mg total) by mouth daily as needed for Pain.      Albuterol Sulfate  (90 Base) MCG/ACT Inhalation Aero Soln Inhale 1 puff into the lungs every 6 (six) hours as needed for Wheezing.        Patient requires assistance with: driving, walking up or down stairs, shopping, household chores (laundry, dishes, vacuuming, etc), outdoor yard work (mowing lawn, trimming, raking, gardening, etc)    Patient lives alone/she has children that help her out.    Have you recently had any feelings of harming yourself or others? The patient's response was no.    Physical Exam:   BP (!) 150/98 (BP Location: Right arm, Patient Position: Sitting)   Pulse 79   SpO2 96%   VAS Pain Score:  6/10  General Appearance: Well developed, well nourished, normal build, independent body habitus, no apparent physical disabilities, well groomed, Ambulates with walker    Neurological Exam: WNL-Orientation to time, place and person, normal mood & effect, normal concentration & attention span  Inspection:   Nonantalgic  Gross motor or sensory deficits  Ambulating with walker slow steady pace  Strength bilateral upper and lower extremities 5 out of 5  Radiology/Lab Test Reviewed: No new images to review  Last urine drug screen in November was within normal limits  Lab Results   Component Value Date    WBC 5.2 10/02/2023    WBC 5.0 03/08/2022    WBC 5.1 07/30/2021   No results found for: \"HEMOGLOBIN\"  Lab Results   Component Value Date    .0 10/02/2023    .0 03/08/2022    .0 07/30/2021         Patient Education:  Patient was advised to continue normal activities as tolerated and was advised against any form of bedrest, since recent guidelines promote and encourage physical activity for improvment of functionality and overall pain.  (Family Practice Vol. 16, No. 1, 30-16Â© [employer] 1999 ).    The patient is on long term opioids and reports  Analgesia: good (provide pain relief)  Anesthesia: none (sedation response)  Adverse effect: none  ADLs: improved (improved or increased dysfunction)  Aberrant use behavior: none (missed appointment/lost meds/early refills)      Complementary approaches to help manage pain: OTC/advil     Patient provided education on risks of chronic opioid use including but not limited to the risk for:   Constipation, nausea, vomiting, respiratory depression, sleep disordered breathing, impaired cognition, somnolence, mental cloudiness, sedation especially if used in combination with other sedating medications such as zdrugs, benzodiazepines, muscle relaxants, alcohol, THC products, and neuropathic medications.  In addition chronic opioid use can result in urinary retention, pruritis, endocrine alterations including weight gain, poor glycemic control, mood disorders, depression, altered sexual function, and weakened bones, making fractures a greater risk.  Also discussed risk for opioid induced neurotoxicity, increased risk for infections such as pneumonia as opioids have been known to reduce immune response.      Reference:  VITALIY Garcia, ELEANOR Gerard, ALEKS Gore (2022) Up-to-date: Prevention and management of side effects in patients receiving opioids for chronic pain     The patient had their urine drug screen as required by their care contract and this was reviewed with them today.    Discussion regarding the addictive nature of the opioid medications and the patients' likelihood of being physically vs. Psychologically addicted  Discussion regarding current morphine equivalents which  at this time is 15 MEDD and mutual goals addressed and implemented     The patient acknowledges the understanding of the addictive nature of this med AND the issue of possible tolerance and/or unintentional over use/dose  The patient acknowledges the concomitant use of other medications in particular anti-anxiety/benzodiazepines or sleeping medications can have untoward interactions with opiates and accepts that risks of the same up to and including possible hospitalization and or death .    The patient has been informed of the level of opiate currently being taken is not enough to warrant antidote medication/narcan     Suggested a weaning trial to see if we can remove the medication from the patients active meds, however at this time the patient does not want to pursue weaning or cessation of the medication at this time reporting that the medications are significantly improving their quality of life and ability to maintain all activities of daily living and are not resulting in any significant side effect.    The issue of addiction and dependence will be discussed routinely with the patient and the patient understands the need for this discussion and is agreeable to the same    Patient educated and verbalized understanding.  Medical Decision Making:   Diagnosis:    Encounter Diagnoses   Name Primary?    Lumbar radiculopathy Yes    Chronic narcotic use     Lumbar spondylosis     Long term (current) use of opiate analgesic     DDD (degenerative disc disease), lumbar     Chronic prescription opiate use      Impression:   Patient is an 88-year-old female with chronic back pain bilateral radicular symptoms.  Patient uses hydrocodone 5/325 3 times daily as needed along with gabapentin 300 mg 3 times daily.  Patient has longstanding history of chronic back pain and feels that the medications help manage her symptoms.  Will repeat urine drug screen and follow-up in 3 months to reevaluate as needed  Plan:   > Update urine  drug screen today  > Refill gabapentin 300 mg 3 times daily and hydrocodone 5/325 3 times daily as needed for pain  > Follow-up in 3 months  Orders Placed This Encounter   Procedures    Pain Management Drug Panel, Urine       Meds & Refills for this Visit:  Requested Prescriptions     Signed Prescriptions Disp Refills    gabapentin 300 MG Oral Cap 90 capsule 0     Sig: Take 1 capsule (300 mg total) by mouth 3 (three) times daily.    HYDROcodone-acetaminophen 5-325 MG Oral Tab 90 tablet 0     Sig: Take 1 tablet by mouth every 8 (eight) hours as needed for Pain.       Imaging & Consults:  None    The patient indicates understanding of these issues and agrees to the plan.      ID#[id number]

## 2024-02-05 NOTE — PATIENT INSTRUCTIONS
Refill policies:    Allow 2-3 business days for refills; controlled substances may take longer.  Contact your pharmacy at least 5 days prior to running out of medication and have them send an electronic request or submit request through the “request refill” option in your [employer] account.  Refills are not addressed on weekends; covering physicians do not authorize routine medications on weekends.  No narcotics or controlled substances are refilled after noon on Fridays or by on call physicians.  By law, narcotics must be electronically prescribed.  A 30 day supply with no refills is the maximum allowed.  If your prescription is due for a refill, you may be due for a follow up appointment.  To best provide you care, patients receiving routine medications need to be seen at least once a year.  Patients receiving narcotic/controlled substance medications need to be seen at least once every 3 months.  In the event that your preferred pharmacy does not have the requested medication in stock (e.g. Backordered), it is your responsibility to find another pharmacy that has the requested medication available.  We will gladly send a new prescription to that pharmacy at your request.    Scheduling Tests:    If your physician has ordered radiology tests such as MRI or CT scans, please contact Central Scheduling at [phone] right away to schedule the test.  Once scheduled, the [facility] Centralized Referral Team will work with your insurance carrier to obtain pre-certification or prior authorization.  Depending on your insurance carrier, approval may take 3-10 days.  It is highly recommended patients assure they have received an authorization before having a test performed.  If test is done without insurance authorization, patient may be responsible for the entire amount billed.      Precertification and Prior Authorizations:  If your physician has recommended that you have a procedure or additional testing performed the [facility]  Centralized Referral Team will contact your insurance carrier to obtain pre-certification or prior authorization.    You are strongly encouraged to contact your insurance carrier to verify that your procedure/test has been approved and is a COVERED benefit.  Although the [facility] Centralized Referral Team does its due diligence, the insurance carrier gives the disclaimer that \"Although the procedure is authorized, this does not guarantee payment.\"    Ultimately the patient is responsible for payment.   Thank you for your understanding in this matter.  Paperwork Completion:  If you require FMLA or disability paperwork for your recovery, please make sure to either drop it off or have it faxed to our office at [phone]. Be sure the form has your name and date of birth on it.  The form will be faxed to our Forms Department and they will complete it for you.  There is a 25$ fee for all forms that need to be filled out.  Please be aware there is a 10-14 day turnaround time.  You will need to sign a release of information (HARSHIL) form if your paperwork does not come with one.  You may call the Forms Department with any questions at [phone].  Their fax number is [phone].

## 2024-02-06 ENCOUNTER — LAB ENCOUNTER (OUTPATIENT)
Dept: LAB | Age: 89
End: 2024-02-06
Attending: NURSE PRACTITIONER
Payer: MEDICARE

## 2024-02-06 DIAGNOSIS — M51.36 DDD (DEGENERATIVE DISC DISEASE), LUMBAR: ICD-10-CM

## 2024-02-06 DIAGNOSIS — M54.16 LUMBAR RADICULOPATHY: ICD-10-CM

## 2024-02-06 DIAGNOSIS — Z79.891 CHRONIC PRESCRIPTION OPIATE USE: ICD-10-CM

## 2024-02-06 DIAGNOSIS — Z79.891 LONG TERM (CURRENT) USE OF OPIATE ANALGESIC: ICD-10-CM

## 2024-02-06 DIAGNOSIS — M47.816 LUMBAR SPONDYLOSIS: ICD-10-CM

## 2024-02-06 PROCEDURE — 80307 DRUG TEST PRSMV CHEM ANLYZR: CPT

## 2024-02-26 NOTE — PROGRESS NOTES
Dana Iraheta is a 88 year old female.  HPI:   HPI   Pt presents today with c/o left ear itchiness, pt states she feels like \"something\" is crawling in her ear. She does use q-tips to clean the ear and in the past has needed ENT to take a cotton ball out of it.    Pt states her memory has been changing. She would like to be evaluated.    HTN- pt does not check BP at home. Pt is not aware which meds she is taking. Does not believe she took her BP medication this morning. Pt brought her pill box with medications, but is not aware what pills are in the pills box.  Has been c/o some dizziness last couple of days. Has not lost her balance. Denies headaches.  Current Outpatient Medications   Medication Sig Dispense Refill    Omeprazole 40 MG Oral Capsule Delayed Release       gabapentin 300 MG Oral Cap Take 1 capsule (300 mg total) by mouth 3 (three) times daily. 90 capsule 0    HYDROcodone-acetaminophen 5-325 MG Oral Tab Take 1 tablet by mouth every 8 (eight) hours as needed for Pain. 90 tablet 0    LEVOTHYROXINE 112 MCG Oral Tab Take 1 tablet (112 mcg total) by mouth before breakfast. 90 tablet 0    ATORVASTATIN 40 MG Oral Tab Take 1 tablet (40 mg total) by mouth nightly. 90 tablet 0    VALSARTAN 80 MG Oral Tab Take 1 tablet (80 mg total) by mouth daily. 90 tablet 0    Ascorbic Acid (VITAMIN C OR) Take by mouth.      montelukast (SINGULAIR) 10 MG Oral Tab Take 1 tablet (10 mg total) by mouth daily. 90 tablet 3    Glucosamine-Chondroit-Vit C-Mn (GLUCOSAMINE 1500 COMPLEX) Oral Cap Take 1,500 mg by mouth daily.      Cholecalciferol 125 MCG (5000 UT) Oral Tab Take 1 tablet (5,000 Units total) by mouth daily.      vitamin E 100 UNITS Oral Cap Take 1 capsule (100 Units total) by mouth daily.      acetaminophen 160 MG Oral Chew Tab Chew 1 tablet (160 mg total) by mouth daily as needed for Pain.      Albuterol Sulfate  (90 Base) MCG/ACT Inhalation Aero Soln Inhale 1 puff into the lungs every 6 (six) hours as needed  for Wheezing.      ofloxacin 0.3 % Otic Solution  (Patient not taking: Reported on 2/27/2024)        Past Medical History:   Diagnosis Date    Asthma (HCC)     Diabetes mellitus (HCC)     GERD (gastroesophageal reflux disease)     Hypothyroid     MRSA (methicillin resistant staph aureus) culture positive 07/2016    Infection to left hand/arm    Osteoarthritis     Other and unspecified hyperlipidemia     Type II or unspecified type diabetes mellitus without mention of complication, not stated as uncontrolled     Unspecified essential hypertension       Social History:  Social History     Socioeconomic History    Marital status:    Tobacco Use    Smoking status: Never    Smokeless tobacco: Never   Vaping Use    Vaping Use: Never used   Substance and Sexual Activity    Alcohol use: No     Alcohol/week: 0.0 standard drinks of alcohol    Drug use: No   Other Topics Concern    Caffeine Concern Yes     Comment: 1-2 cups daily    Exercise No     Comment: rare        REVIEW OF SYSTEMS:   GENERAL HEALTH: feels well otherwise. Denies fever, chills, unintentional weight change  SKIN: denies any unusual skin lesions or rashes  RESPIRATORY: denies shortness of breath with exertion, denies cough or wheezing  CARDIOVASCULAR: denies chest pain or palpitations, denies leg swelling  GI: denies abdominal pain and denies heartburn. Denies nausea, vomiting, diarrhea, constipation  NEURO: denies headaches, dizziness, weakness, syncope  PSYCH: denies anxiety, depression, insomnia    EXAM:   BP (!) 180/90   Pulse 62   Temp 97.2 °F (36.2 °C)   Resp 16   Ht 4' 11\" (1.499 m)   Wt 153 lb (69.4 kg)   SpO2 97%   BMI 30.90 kg/m²   GENERAL: well developed, well nourished,in no apparent distress  SKIN: no rashes,no suspicious lesions, warm and dry  HEENT: atraumatic, normocephalic, throat are clear. Seems like cotton in the left external canal  NECK: supple,no adenopathy, no thyromegaly  LUNGS: clear to auscultation b/l no  W/R/R  CARDIO: RRR without murmur  GI: good BS's,no masses, HSM, distension or tenderness  EXTREMITIES: no cyanosis, clubbing or edema  MUSCULOSKELETAL: FROM, no joint swelling or bony tenderness  NEURO: a/ox3, no focal deficits  PSYCH: mood and affect normal    ASSESSMENT AND PLAN:     Encounter Diagnoses   Name Primary?    Essential hypertension  -uncontrolled. Unsure which medication patient is taking. Asked to return in couple of days with pill bottles to assess if medication titration is necessary Yes    Foreign body of left ear, initial encounter  -referral to ENT     Memory changes  -referral to neurology      Requested Prescriptions      No prescriptions requested or ordered in this encounter         The patient indicates understanding of these issues and agrees to the plan.  The patient is asked to return in couple of days..

## 2024-02-27 ENCOUNTER — OFFICE VISIT (OUTPATIENT)
Dept: INTERNAL MEDICINE CLINIC | Facility: CLINIC | Age: 89
End: 2024-02-27
Payer: MEDICARE

## 2024-02-27 VITALS
DIASTOLIC BLOOD PRESSURE: 90 MMHG | WEIGHT: 153 LBS | HEART RATE: 62 BPM | BODY MASS INDEX: 30.84 KG/M2 | RESPIRATION RATE: 16 BRPM | HEIGHT: 59 IN | OXYGEN SATURATION: 97 % | SYSTOLIC BLOOD PRESSURE: 180 MMHG | TEMPERATURE: 97 F

## 2024-02-27 DIAGNOSIS — R41.3 MEMORY CHANGES: ICD-10-CM

## 2024-02-27 DIAGNOSIS — T16.2XXA FOREIGN BODY OF LEFT EAR, INITIAL ENCOUNTER: ICD-10-CM

## 2024-02-27 DIAGNOSIS — I10 ESSENTIAL HYPERTENSION: Primary | ICD-10-CM

## 2024-02-27 PROCEDURE — 3080F DIAST BP >= 90 MM HG: CPT

## 2024-02-27 PROCEDURE — 1159F MED LIST DOCD IN RCRD: CPT

## 2024-02-27 PROCEDURE — 3008F BODY MASS INDEX DOCD: CPT

## 2024-02-27 PROCEDURE — 1170F FXNL STATUS ASSESSED: CPT

## 2024-02-27 PROCEDURE — 3077F SYST BP >= 140 MM HG: CPT

## 2024-02-27 PROCEDURE — 1160F RVW MEDS BY RX/DR IN RCRD: CPT

## 2024-02-27 PROCEDURE — 99214 OFFICE O/P EST MOD 30 MIN: CPT

## 2024-02-27 NOTE — PATIENT INSTRUCTIONS
Double check the bottles at home and make sure you are taking Valsartan every day for your blood pressure.  Return with your medication bottles.  When returning for appointment take the blood pressure medication at least an hour before it.     Please scheduled with ENT for the ear cleaning.    Please schedule with neurology for memory evaluation.

## 2024-02-29 NOTE — PROGRESS NOTES
Dana Iraheta is a 88 year old female.  HPI:   HPI   Pt presents today with her daughter. Brought her medication bottles. Confirmed all the medication she is talking.  Took her BP medication this morning.   Pt continue to c/o of the left ear pain.  Pt c/o itchiness under her breast.  Current Outpatient Medications   Medication Sig Dispense Refill    doxycycline 100 MG Oral Cap Take 1 capsule (100 mg total) by mouth 2 (two) times daily. 14 capsule 0    ketoconazole 2 % External Cream Apply 1 Application topically daily. 15 g 0    ofloxacin 0.3 % Otic Solution       Omeprazole 40 MG Oral Capsule Delayed Release       gabapentin 300 MG Oral Cap Take 1 capsule (300 mg total) by mouth 3 (three) times daily. 90 capsule 0    HYDROcodone-acetaminophen 5-325 MG Oral Tab Take 1 tablet by mouth every 8 (eight) hours as needed for Pain. 90 tablet 0    LEVOTHYROXINE 112 MCG Oral Tab Take 1 tablet (112 mcg total) by mouth before breakfast. 90 tablet 0    ATORVASTATIN 40 MG Oral Tab Take 1 tablet (40 mg total) by mouth nightly. 90 tablet 0    VALSARTAN 80 MG Oral Tab Take 1 tablet (80 mg total) by mouth daily. 90 tablet 0    Ascorbic Acid (VITAMIN C OR) Take by mouth.      montelukast (SINGULAIR) 10 MG Oral Tab Take 1 tablet (10 mg total) by mouth daily. 90 tablet 3    Glucosamine-Chondroit-Vit C-Mn (GLUCOSAMINE 1500 COMPLEX) Oral Cap Take 1,500 mg by mouth daily.      Cholecalciferol 125 MCG (5000 UT) Oral Tab Take 1 tablet (5,000 Units total) by mouth daily.      vitamin E 100 UNITS Oral Cap Take 1 capsule (100 Units total) by mouth daily.      acetaminophen 160 MG Oral Chew Tab Chew 1 tablet (160 mg total) by mouth daily as needed for Pain.      Albuterol Sulfate  (90 Base) MCG/ACT Inhalation Aero Soln Inhale 1 puff into the lungs every 6 (six) hours as needed for Wheezing.        Past Medical History:   Diagnosis Date    Asthma (HCC)     Diabetes mellitus (HCC)     GERD (gastroesophageal reflux disease)      Hypothyroid     MRSA (methicillin resistant staph aureus) culture positive 07/2016    Infection to left hand/arm    Osteoarthritis     Other and unspecified hyperlipidemia     Type II or unspecified type diabetes mellitus without mention of complication, not stated as uncontrolled     Unspecified essential hypertension       Social History:  Social History     Socioeconomic History    Marital status:    Tobacco Use    Smoking status: Never    Smokeless tobacco: Never   Vaping Use    Vaping Use: Never used   Substance and Sexual Activity    Alcohol use: No     Alcohol/week: 0.0 standard drinks of alcohol    Drug use: No   Other Topics Concern    Caffeine Concern Yes     Comment: 1-2 cups daily    Exercise No     Comment: rare        REVIEW OF SYSTEMS:   GENERAL HEALTH: feels well otherwise. Denies fever, chills, unintentional weight change  SKIN: denies any unusual skin lesions or rashes  RESPIRATORY: denies shortness of breath with exertion, denies cough or wheezing  CARDIOVASCULAR: denies chest pain or palpitations, denies leg swelling  GI: denies abdominal pain and denies heartburn. Denies nausea, vomiting, diarrhea, constipation  NEURO: denies headaches, dizziness, weakness, syncope  PSYCH: denies anxiety, depression, insomnia    EXAM:   /80   Pulse 80   Temp 97 °F (36.1 °C)   Resp 16   SpO2 98%   GENERAL: well developed, well nourished,in no apparent distress  SKIN: no rashes,no suspicious lesions, warm and dry  HEENT: atraumatic, normocephalic, R ear middle effusion. Left tympanic membrane unable to be visualized; unable to tell if it is part of the cotton ball or infection. External ear canal looks irritated.  NECK: supple,no adenopathy, no thyromegaly  LUNGS: clear to auscultation b/l no W/R/R  CARDIO: RRR without murmur  GI: good BS's,no masses, HSM, distension or tenderness  EXTREMITIES: no cyanosis, clubbing or edema  MUSCULOSKELETAL: FROM, no joint swelling or bony tenderness  NEURO:  a/ox3, no focal deficits  PSYCH: mood and affect normal    ASSESSMENT AND PLAN:     Encounter Diagnoses   Name Primary?    Essential hypertension Yes    Acute left otitis media     Acute otitis externa of left ear, unspecified type     Fungal skin infection     Acute GEENA (middle ear effusion), bilateral    HTN-controlled; CPM  Due to left ear pain will cover for infection with antibiotic. Pt has ENT appointment later this month. Due to external ear cannel irritated will have her use Oflaxacin ear drops(pt has a brand new bottle with her)  Discussed to start using Flonase 2 sprays daily  Ketoconazole cream under the breast.  Requested Prescriptions     Signed Prescriptions Disp Refills    doxycycline 100 MG Oral Cap 14 capsule 0     Sig: Take 1 capsule (100 mg total) by mouth 2 (two) times daily.    ketoconazole 2 % External Cream 15 g 0     Sig: Apply 1 Application topically daily.         The patient indicates understanding of these issues and agrees to the plan.  The patient is asked to return in 1 month for medication follow up.

## 2024-03-02 ENCOUNTER — OFFICE VISIT (OUTPATIENT)
Dept: INTERNAL MEDICINE CLINIC | Facility: CLINIC | Age: 89
End: 2024-03-02
Payer: MEDICARE

## 2024-03-02 VITALS
DIASTOLIC BLOOD PRESSURE: 80 MMHG | SYSTOLIC BLOOD PRESSURE: 135 MMHG | OXYGEN SATURATION: 98 % | RESPIRATION RATE: 16 BRPM | HEART RATE: 80 BPM | TEMPERATURE: 97 F

## 2024-03-02 DIAGNOSIS — H66.92 ACUTE LEFT OTITIS MEDIA: ICD-10-CM

## 2024-03-02 DIAGNOSIS — H65.193 ACUTE MEE (MIDDLE EAR EFFUSION), BILATERAL: ICD-10-CM

## 2024-03-02 DIAGNOSIS — B36.9 FUNGAL SKIN INFECTION: ICD-10-CM

## 2024-03-02 DIAGNOSIS — I10 ESSENTIAL HYPERTENSION: Primary | ICD-10-CM

## 2024-03-02 DIAGNOSIS — H60.502 ACUTE OTITIS EXTERNA OF LEFT EAR, UNSPECIFIED TYPE: ICD-10-CM

## 2024-03-02 PROCEDURE — 99214 OFFICE O/P EST MOD 30 MIN: CPT

## 2024-03-02 PROCEDURE — 1159F MED LIST DOCD IN RCRD: CPT

## 2024-03-02 PROCEDURE — 1160F RVW MEDS BY RX/DR IN RCRD: CPT

## 2024-03-02 PROCEDURE — 3008F BODY MASS INDEX DOCD: CPT

## 2024-03-02 PROCEDURE — 3075F SYST BP GE 130 - 139MM HG: CPT

## 2024-03-02 PROCEDURE — 3079F DIAST BP 80-89 MM HG: CPT

## 2024-03-02 RX ORDER — KETOCONAZOLE 20 MG/G
1 CREAM TOPICAL DAILY
Qty: 15 G | Refills: 0 | Status: SHIPPED | OUTPATIENT
Start: 2024-03-02

## 2024-03-02 RX ORDER — DOXYCYCLINE HYCLATE 100 MG/1
100 CAPSULE ORAL 2 TIMES DAILY
Qty: 14 CAPSULE | Refills: 0 | Status: SHIPPED | OUTPATIENT
Start: 2024-03-02

## 2024-03-02 NOTE — PATIENT INSTRUCTIONS
Take antibiotic with food as prescribed.  Start using the ear drops 5 drops twice a day for 7 days   Start using Flonase nasal spray two sprays each nostril a day  Apply the antifungal cream once a day. Keep the area dry.

## 2024-03-05 DIAGNOSIS — M43.07 LUMBOSACRAL SPONDYLOLYSIS: ICD-10-CM

## 2024-03-05 DIAGNOSIS — M54.16 LUMBAR RADICULOPATHY: ICD-10-CM

## 2024-03-05 DIAGNOSIS — M47.819 ARTHROPATHY OF FACET JOINT: ICD-10-CM

## 2024-03-06 RX ORDER — GABAPENTIN 300 MG/1
300 CAPSULE ORAL 3 TIMES DAILY
Qty: 90 CAPSULE | Refills: 0 | Status: SHIPPED | OUTPATIENT
Start: 2024-03-06

## 2024-03-06 NOTE — TELEPHONE ENCOUNTER
Medication:   gabapentin 300 MG Oral Cap     Date of last refill: 2/5/24    Last office visit: 2/5/24  Due back to clinic per last office note:  FU in 3 months  Date next office visit scheduled:  5/6/24    Last OV note recommendation:   Medical Decision Making:   Diagnosis:         Encounter Diagnoses   Name Primary?    Lumbar radiculopathy Yes    Chronic narcotic use      Lumbar spondylosis      Long term (current) use of opiate analgesic      DDD (degenerative disc disease), lumbar      Chronic prescription opiate use        Impression:   Patient is an 88-year-old female with chronic back pain bilateral radicular symptoms.  Patient uses hydrocodone 5/325 3 times daily as needed along with gabapentin 300 mg 3 times daily.  Patient has longstanding history of chronic back pain and feels that the medications help manage her symptoms.  Will repeat urine drug screen and follow-up in 3 months to reevaluate as needed  Plan:   > Update urine drug screen today  > Refill gabapentin 300 mg 3 times daily and hydrocodone 5/325 3 times daily as needed for pain  > Follow-up in 3 months      Orders Placed This Encounter   Procedures    Pain Management Drug Panel, Urine         Meds & Refills for this Visit:  Requested Prescriptions             Signed Prescriptions Disp Refills    gabapentin 300 MG Oral Cap 90 capsule 0       Sig: Take 1 capsule (300 mg total) by mouth 3 (three) times daily.    HYDROcodone-acetaminophen 5-325 MG Oral Tab 90 tablet 0       Sig: Take 1 tablet by mouth every 8 (eight) hours as needed for Pain.         Imaging & Consults:  None     The patient indicates understanding of these issues and agrees to the plan.        ID#[id number]

## 2024-03-08 DIAGNOSIS — M54.16 LUMBAR RADICULOPATHY: ICD-10-CM

## 2024-03-08 DIAGNOSIS — F11.90 CHRONIC NARCOTIC USE: ICD-10-CM

## 2024-03-08 DIAGNOSIS — M47.816 LUMBAR SPONDYLOSIS: ICD-10-CM

## 2024-03-08 DIAGNOSIS — Z79.891 LONG TERM (CURRENT) USE OF OPIATE ANALGESIC: ICD-10-CM

## 2024-03-08 DIAGNOSIS — M51.36 DDD (DEGENERATIVE DISC DISEASE), LUMBAR: ICD-10-CM

## 2024-03-08 DIAGNOSIS — Z79.891 CHRONIC PRESCRIPTION OPIATE USE: ICD-10-CM

## 2024-03-08 DIAGNOSIS — M43.07 LUMBOSACRAL SPONDYLOLYSIS: ICD-10-CM

## 2024-03-08 RX ORDER — HYDROCODONE BITARTRATE AND ACETAMINOPHEN 5; 325 MG/1; MG/1
1 TABLET ORAL EVERY 8 HOURS PRN
Qty: 90 TABLET | Refills: 0 | Status: SHIPPED | OUTPATIENT
Start: 2024-03-08

## 2024-03-08 NOTE — TELEPHONE ENCOUNTER
Medication:   HYDROcodone-acetaminophen 5-325 MG Oral Tab       Date of last refill: 2/5/24  [place] 2[place]5/24    Last office visit: 2/5/24  Due back to clinic per last office note:  FU in 3 months  Date next office visit scheduled:  5/6/24    Last URINE Screen: 2/6/24  Screen Results:  Result Care Coordination  Result Notes     Loida Bustamante, APRN  2/18/2024 11:27 AM CST Back to Top      As expected based on prescribed meds         Narcotic Contract EXPIRATION date: 11/3/24    Last OV note recommendation:   Medical Decision Making:   Diagnosis:         Encounter Diagnoses   Name Primary?    Lumbar radiculopathy Yes    Chronic narcotic use      Lumbar spondylosis      Long term (current) use of opiate analgesic      DDD (degenerative disc disease), lumbar      Chronic prescription opiate use        Impression:   Patient is an 88-year-old female with chronic back pain bilateral radicular symptoms.  Patient uses hydrocodone 5/325 3 times daily as needed along with gabapentin 300 mg 3 times daily.  Patient has longstanding history of chronic back pain and feels that the medications help manage her symptoms.  Will repeat urine drug screen and follow-up in 3 months to reevaluate as needed  Plan:   > Update urine drug screen today  > Refill gabapentin 300 mg 3 times daily and hydrocodone 5/325 3 times daily as needed for pain  > Follow-up in 3 months      Orders Placed This Encounter   Procedures    Pain Management Drug Panel, Urine         Meds & Refills for this Visit:  Requested Prescriptions             Signed Prescriptions Disp Refills    gabapentin 300 MG Oral Cap 90 capsule 0       Sig: Take 1 capsule (300 mg total) by mouth 3 (three) times daily.    HYDROcodone-acetaminophen 5-325 MG Oral Tab 90 tablet 0       Sig: Take 1 tablet by mouth every 8 (eight) hours as needed for Pain.         Imaging & Consults:  None     The patient indicates understanding of these issues and  Patient placed in ROOM 31 and placed on monitor. MD made aware patient appears to be in distress with minimal air movement.    agrees to the plan.        ID#[id number]

## 2024-03-08 NOTE — TELEPHONE ENCOUNTER
Patient calling to request refill of HYDROcodone-acetaminophen 5-325 MG Oral Tab   Pharmacy [id number] - [place] [id number] [facility] [address], [phone], [phone] [[id number]]     Patient informed of 48 hour refill policy excluding weekends and holidays.  Informed patient prescription is sent directly to pharmacy.    Further explained patient will not receive a call back once prescription is ready.    O-T Advancement Flap Text: The defect edges were debeveled with a #15 scalpel blade.  Given the location of the defect, shape of the defect and the proximity to free margins an O-T advancement flap was deemed most appropriate.  Using a sterile surgical marker, an appropriate advancement flap was drawn incorporating the defect and placing the expected incisions within the relaxed skin tension lines where possible.    The area thus outlined was incised deep to adipose tissue with a #15 scalpel blade.  The skin margins were undermined to an appropriate distance in all directions utilizing iris scissors.

## 2024-03-14 ENCOUNTER — TELEPHONE (OUTPATIENT)
Dept: INTERNAL MEDICINE CLINIC | Facility: CLINIC | Age: 89
End: 2024-03-14

## 2024-03-14 NOTE — TELEPHONE ENCOUNTER
Reached patient for medication adherence consult via  but patient prefers to talk in English. Patient overdue for refill on valsartan per insurance report. Patient filled 90 tablets for 30 day supply on 1/22 but patient instructions are to take 1 tablet daily.       Patient reports that she is taking her medication as prescribed. She states that she writes down  when she takes her medications to ensure that she does not forget. Patient reports tolerating her medication well. She denies the need for refills at this time.    Provided education on importance of adherence. Patient denies any questions or concerns with medication.

## 2024-03-25 ENCOUNTER — OFFICE VISIT (OUTPATIENT)
Facility: LOCATION | Age: 89
End: 2024-03-25
Payer: MEDICARE

## 2024-03-25 DIAGNOSIS — H60.392 OTHER INFECTIVE ACUTE OTITIS EXTERNA OF LEFT EAR: Primary | ICD-10-CM

## 2024-03-25 DIAGNOSIS — H93.293 ABNORMAL AUDITORY PERCEPTION OF BOTH EARS: Primary | ICD-10-CM

## 2024-03-25 PROCEDURE — 1160F RVW MEDS BY RX/DR IN RCRD: CPT | Performed by: OTOLARYNGOLOGY

## 2024-03-25 PROCEDURE — 99213 OFFICE O/P EST LOW 20 MIN: CPT | Performed by: OTOLARYNGOLOGY

## 2024-03-25 PROCEDURE — 1159F MED LIST DOCD IN RCRD: CPT | Performed by: OTOLARYNGOLOGY

## 2024-03-25 NOTE — PROGRESS NOTES
Dana Iraheta was not seen for audiometric evaluation today.  Referred back to physician.     Vera Becerra

## 2024-03-25 NOTE — PROGRESS NOTES
Dana Iraheta is a 88 year old female. No chief complaint on file.    HPI:   Patient complains of plugging and drainage from her left ear.  She has been using Floxin drops with only minimal change.  She denies fevers chills.  She does feel slightly off balance.  Current Outpatient Medications   Medication Sig Dispense Refill    HYDROcodone-acetaminophen 5-325 MG Oral Tab Take 1 tablet by mouth every 8 (eight) hours as needed for Pain. 90 tablet 0    GABAPENTIN 300 MG Oral Cap TAKE 1 CAPSULE(300 MG) BY MOUTH THREE TIMES DAILY 90 capsule 0    doxycycline 100 MG Oral Cap Take 1 capsule (100 mg total) by mouth 2 (two) times daily. 14 capsule 0    ketoconazole 2 % External Cream Apply 1 Application topically daily. 15 g 0    ofloxacin 0.3 % Otic Solution       Omeprazole 40 MG Oral Capsule Delayed Release       LEVOTHYROXINE 112 MCG Oral Tab Take 1 tablet (112 mcg total) by mouth before breakfast. 90 tablet 0    ATORVASTATIN 40 MG Oral Tab Take 1 tablet (40 mg total) by mouth nightly. 90 tablet 0    VALSARTAN 80 MG Oral Tab Take 1 tablet (80 mg total) by mouth daily. 90 tablet 0    Ascorbic Acid (VITAMIN C OR) Take by mouth.      montelukast (SINGULAIR) 10 MG Oral Tab Take 1 tablet (10 mg total) by mouth daily. 90 tablet 3    Glucosamine-Chondroit-Vit C-Mn (GLUCOSAMINE 1500 COMPLEX) Oral Cap Take 1,500 mg by mouth daily.      Cholecalciferol 125 MCG (5000 UT) Oral Tab Take 1 tablet (5,000 Units total) by mouth daily.      vitamin E 100 UNITS Oral Cap Take 1 capsule (100 Units total) by mouth daily.      acetaminophen 160 MG Oral Chew Tab Chew 1 tablet (160 mg total) by mouth daily as needed for Pain.      Albuterol Sulfate  (90 Base) MCG/ACT Inhalation Aero Soln Inhale 1 puff into the lungs every 6 (six) hours as needed for Wheezing.        Past Medical History:   Diagnosis Date    Asthma (HCC)     Diabetes mellitus (HCC)     GERD (gastroesophageal reflux disease)     Hypothyroid     MRSA (methicillin  resistant staph aureus) culture positive 07/2016    Infection to left hand/arm    Osteoarthritis     Other and unspecified hyperlipidemia     Type II or unspecified type diabetes mellitus without mention of complication, not stated as uncontrolled     Unspecified essential hypertension       Social History:  Social History     Socioeconomic History    Marital status:    Tobacco Use    Smoking status: Never    Smokeless tobacco: Never   Vaping Use    Vaping Use: Never used   Substance and Sexual Activity    Alcohol use: No     Alcohol/week: 0.0 standard drinks of alcohol    Drug use: No   Other Topics Concern    Caffeine Concern Yes     Comment: 1-2 cups daily    Exercise No     Comment: rare      Past Surgical History:   Procedure Laterality Date    APPENDECTOMY      CHOLECYSTECTOMY      HYSTERECTOMY  2005    and bl oophorectomy    LUMPECTOMY LEFT      OTHER      bladder lift    OTHER      varicose vein surgery    OTHER  January 2014    bladder mesh repair    OTHER SURGICAL HISTORY  7/10/2014    Procedure: LUMBAR FACET INJECTION;  Surgeon: Gayle Corral MD;  Location:  MAIN OR    OTHER SURGICAL HISTORY  9/2/2014    Procedure: RADIOFREQUENCY ABLATION OF THORACIC OR LUMBAR MEDIAL BRANCH;  Surgeon: Gayle Corral MD;  Location:  MAIN OR    OTHER SURGICAL HISTORY  9/18/2014    Procedure: RADIOFREQUENCY ABLATION OF THORACIC OR LUMBAR MEDIAL BRANCH;  Surgeon: Gayle Corral MD;  Location:  MAIN OR    OTHER SURGICAL HISTORY Bilateral 3/9/2015    Procedure: SACROILIAC JOINT INJECTION BILATERAL;  Surgeon: Gayle Corral MD;  Location:  MAIN OR    OTHER SURGICAL HISTORY Right 9/3/2015    Procedure: RADIOFREQUENCY ABLATION OF THORACIC OR LUMBAR MEDIAL BRANCH;  Surgeon: Gayle Corral MD;  Location:  MAIN OR    OTHER SURGICAL HISTORY Left 9/15/2015    Procedure: RADIOFREQUENCY ABLATION OF THORACIC OR LUMBAR MEDIAL BRANCH;  Surgeon: Gayle Corral MD;  Location:  MAIN OR    OTHER SURGICAL  HISTORY Bilateral 6/20/2016    Procedure: TRANSFORAMINAL LUMBAR EPIDURAL STEROID INJECTION SINGLE LEVEL;  Surgeon: Gayle Corral MD;  Location: [facility]    STAB PHLEBECTOMY, VARICOSE VEINS, 1 EXTREMITY; <20 INCISIONS Bilateral 2000    bl vein stripping         REVIEW OF SYSTEMS:   GENERAL HEALTH: feels well otherwise  GENERAL : denies fever, chills, sweats, weight loss, weight gain  SKIN: denies any unusual skin lesions or rashes  RESPIRATORY: denies shortness of breath with exertion  NEURO: denies headaches    EXAM:   There were no vitals taken for this visit.  System Findings Details   Skin Normal Inspection - Normal.   Constitutional Normal Overall appearance - Normal.   Head/Face Normal Facial features - Normal. Eyebrows - Normal. Skull - Normal.   Oral/Oropharynx Normal Lips - Normal, Tonsils - Normal, Tongue - Normal    Nasal Normal External nose - Normal. Nasal septum - Normal, Turbinates - Normal   Neurological Normal Memory - Normal. Cranial nerves - Cranial nerves II through XII grossly intact.   Neck Exam Normal Inspection - Normal. Palpation - Normal. Parotid gland - Normal. Thyroid gland - Normal.   Psychiatric Normal Orientation - Oriented to time, place, person & situation. Appropriate mood and affect.   Lymph Detail Normal Submental. Submandibular. Anterior cervical. Posterior cervical. Supraclavicular.   Eyes Normal Conjunctiva - Right: Normal, Left: Normal. Pupil - Right: Normal, Left: Normal.    Ears Normal Inspection - Right: Normal, Left: Waxy debris and pus was removed under microscopic guidance with suction. Canal - Left: Normal. TM - Right: Normal, Left: Normal.     ASSESSMENT AND PLAN:   1. Other infective acute otitis externa of left ear  She will continue Floxin for another week.  Drops should work better now that the debris was cleaned.  She will follow-up in 2 weeks for recheck.      The patient indicates understanding of these issues and agrees to the plan.      Murphy Coleman,  MD  3/25/2024  1:54 PM

## 2024-03-26 RX ORDER — OFLOXACIN 3 MG/ML
SOLUTION AURICULAR (OTIC)
Qty: 1 EACH | Refills: 1 | Status: SHIPPED | OUTPATIENT
Start: 2024-03-26

## 2024-03-26 NOTE — TELEPHONE ENCOUNTER
Pt daughter called and stated that they were seen yesterday and they thought they had more ear drops but they were wrong and need a prescription sent to pharmacy

## 2024-04-04 DIAGNOSIS — M54.16 LUMBAR RADICULOPATHY: ICD-10-CM

## 2024-04-04 DIAGNOSIS — M47.819 ARTHROPATHY OF FACET JOINT: ICD-10-CM

## 2024-04-04 DIAGNOSIS — F11.90 CHRONIC NARCOTIC USE: ICD-10-CM

## 2024-04-04 DIAGNOSIS — Z79.891 CHRONIC PRESCRIPTION OPIATE USE: ICD-10-CM

## 2024-04-04 DIAGNOSIS — M47.816 LUMBAR SPONDYLOSIS: ICD-10-CM

## 2024-04-04 DIAGNOSIS — M43.07 LUMBOSACRAL SPONDYLOLYSIS: ICD-10-CM

## 2024-04-04 DIAGNOSIS — Z79.891 LONG TERM (CURRENT) USE OF OPIATE ANALGESIC: ICD-10-CM

## 2024-04-04 DIAGNOSIS — M51.36 DDD (DEGENERATIVE DISC DISEASE), LUMBAR: ICD-10-CM

## 2024-04-04 RX ORDER — HYDROCODONE BITARTRATE AND ACETAMINOPHEN 5; 325 MG/1; MG/1
1 TABLET ORAL EVERY 8 HOURS PRN
Qty: 90 TABLET | Refills: 0 | Status: SHIPPED | OUTPATIENT
Start: 2024-04-07 | End: 2024-05-07

## 2024-04-04 RX ORDER — GABAPENTIN 300 MG/1
300 CAPSULE ORAL 3 TIMES DAILY
Qty: 90 CAPSULE | Refills: 0 | Status: SHIPPED | OUTPATIENT
Start: 2024-04-04

## 2024-04-04 NOTE — TELEPHONE ENCOUNTER
Patient calling to request refill of HYDROcodone-acetaminophen 5-325 MG Oral Tab AND GABAPENTIN 300 MG Oral Cap   Pharmacy [facility] #[id number] [place], [place] - [address] [facility] [address],     Patient informed of 48 hour refill policy excluding weekends and holidays.  Informed patient prescription is sent directly to pharmacy.    Further explained patient will not receive a call back once prescription is ready.

## 2024-04-04 NOTE — TELEPHONE ENCOUNTER
Medication: GABAPENTIN 300 MG     Date of last refill: 03/06/24      Medication: HYDROcodone-acetaminophen 5-325 MG     Date of last refill: 03/08/24  [place]24    Last office visit: 02/05/24  Due back to clinic per last office note:  3 months  Date next office visit scheduled:  05/06/24    Last URINE Screen: 02/10/24  Screen Results:  as expected      Narcotic Contract EXPIRATION date: 11/03/24    Last OV note recommendation: Plan:   > Update urine drug screen today  > Refill gabapentin 300 mg 3 times daily and hydrocodone 5/325 3 times daily as needed for pain  > Follow-up in 3 months

## 2024-04-11 DIAGNOSIS — M43.07 LUMBOSACRAL SPONDYLOLYSIS: ICD-10-CM

## 2024-04-11 DIAGNOSIS — I10 ESSENTIAL HYPERTENSION: ICD-10-CM

## 2024-04-11 DIAGNOSIS — E78.2 MIXED HYPERLIPIDEMIA: ICD-10-CM

## 2024-04-11 DIAGNOSIS — M54.16 LUMBAR RADICULOPATHY: ICD-10-CM

## 2024-04-11 DIAGNOSIS — R80.9 TYPE 2 DIABETES MELLITUS WITH MICROALBUMINURIA, WITHOUT LONG-TERM CURRENT USE OF INSULIN (HCC): ICD-10-CM

## 2024-04-11 DIAGNOSIS — M47.819 ARTHROPATHY OF FACET JOINT: ICD-10-CM

## 2024-04-11 DIAGNOSIS — E03.9 ACQUIRED HYPOTHYROIDISM: ICD-10-CM

## 2024-04-11 DIAGNOSIS — E11.29 TYPE 2 DIABETES MELLITUS WITH MICROALBUMINURIA, WITHOUT LONG-TERM CURRENT USE OF INSULIN (HCC): ICD-10-CM

## 2024-04-11 RX ORDER — ATORVASTATIN CALCIUM 40 MG/1
40 TABLET, FILM COATED ORAL EVERY EVENING
Qty: 90 TABLET | Refills: 0 | Status: SHIPPED | OUTPATIENT
Start: 2024-04-11

## 2024-04-11 RX ORDER — VALSARTAN 80 MG/1
TABLET ORAL
Qty: 90 TABLET | Refills: 0 | Status: SHIPPED | OUTPATIENT
Start: 2024-04-11

## 2024-04-11 RX ORDER — OMEPRAZOLE 40 MG/1
40 CAPSULE, DELAYED RELEASE ORAL EVERY MORNING
Qty: 90 CAPSULE | Refills: 0 | Status: SHIPPED | OUTPATIENT
Start: 2024-04-11

## 2024-04-11 RX ORDER — LEVOTHYROXINE SODIUM 112 UG/1
112 TABLET ORAL
Qty: 90 TABLET | Refills: 0 | Status: SHIPPED | OUTPATIENT
Start: 2024-04-11

## 2024-04-11 NOTE — TELEPHONE ENCOUNTER
Last time medication was refilled 01/22/2024  Last OV 03/02/2024  Next OV due/scheduled   Future Appointments   Date Time Provider Department Center   4/22/2024 11:15 AM [facility] [id number]   4/22/2024 11:30 AM Murphy Coleman MD [facility] [id number]   4/27/2024  9:30 AM Paige Cottrell PA-C [facility] [address]   5/6/2024  8:30 AM Loida Bustamante APRN [facility] [place]     Medication not on protocol.

## 2024-04-11 NOTE — TELEPHONE ENCOUNTER
ATORVASTATIN 40 MG Oral Tab     Last time medication was refilled 01/22/2024  Last OV 03/02/2024  Next OV due/scheduled 04/27/2024  Passed protocol, Rx sent.    LEVOTHYROXINE 112 MCG Oral Tab   Last time medication was refilled 01/22/2024  Last OV 03/02/2024  Next OV due/scheduled 04/27/2024  Passed protocol, Rx sent.    VALSARTAN 80 MG Oral Tab   Last time medication was refilled 01/22/2024  Last OV 03/02/2024  Next OV due/scheduled 04/27/2024  Passed protocol, Rx sent.

## 2024-04-12 RX ORDER — GABAPENTIN 300 MG/1
300 CAPSULE ORAL 3 TIMES DAILY
Qty: 90 CAPSULE | Refills: 0 | Status: SHIPPED | OUTPATIENT
Start: 2024-05-04

## 2024-04-12 NOTE — TELEPHONE ENCOUNTER
Medication: gabapentin 300 MG     Date of last refill: 04/04/24      Last office visit: 02/05/24  Due back to clinic per last office note:  3 months  Date next office visit scheduled:  05/06/24        Last OV note recommendation:   Plan:   > Update urine drug screen today  > Refill gabapentin 300 mg 3 times daily and hydrocodone 5/325 3 times daily as needed for pain  > Follow-up in 3 months

## 2024-04-22 ENCOUNTER — OFFICE VISIT (OUTPATIENT)
Facility: LOCATION | Age: 89
End: 2024-04-22
Payer: MEDICARE

## 2024-04-22 DIAGNOSIS — H60.392 OTHER INFECTIVE ACUTE OTITIS EXTERNA OF LEFT EAR: Primary | ICD-10-CM

## 2024-04-22 PROCEDURE — 1159F MED LIST DOCD IN RCRD: CPT | Performed by: OTOLARYNGOLOGY

## 2024-04-22 PROCEDURE — 1160F RVW MEDS BY RX/DR IN RCRD: CPT | Performed by: OTOLARYNGOLOGY

## 2024-04-22 PROCEDURE — 99214 OFFICE O/P EST MOD 30 MIN: CPT | Performed by: OTOLARYNGOLOGY

## 2024-04-22 RX ORDER — CLOTRIMAZOLE 1 G/ML
SOLUTION TOPICAL
Qty: 10 ML | Refills: 0 | Status: SHIPPED | OUTPATIENT
Start: 2024-04-22 | End: 2024-05-22

## 2024-04-22 NOTE — PROGRESS NOTES
Dana Iraheta is a 88 year old female.   Chief Complaint   Patient presents with    Ear Problem     HPI:   Patient is had persistent plugging and pain in her left ear.  Denies fevers chills nausea or vomiting.  She feels if she is underwater.  Current Outpatient Medications   Medication Sig Dispense Refill    clotrimazole 1 % External Solution Apply to affected ear 4 drops three times daily 10 mL 0    HYDROcodone-acetaminophen 5-325 MG Oral Tab Take 1 tablet by mouth every 8 (eight) hours as needed for Pain. 90 tablet 0    [START ON 5/4/2024] gabapentin 300 MG Oral Cap Take 1 capsule (300 mg total) by mouth 3 (three) times daily. 90 capsule 0    Omeprazole 40 MG Oral Capsule Delayed Release Take 1 capsule (40 mg total) by mouth every morning. 90 capsule 0    LEVOTHYROXINE 112 MCG Oral Tab TAKE 1 TABLET BY MOUTH DAILY BEFORE MEAL 90 tablet 0    ATORVASTATIN 40 MG Oral Tab TAKE 1 TABLET BY MOUTH DAILY IN EVENING 90 tablet 0    VALSARTAN 80 MG Oral Tab TAKE 1 TABLET BY MOUTH DAILY IN THE MORNING **FOR BLOOD PRESSURE** 90 tablet 0    ofloxacin 0.3 % Otic Solution Take 5 drops twice a day 1 each 1    doxycycline 100 MG Oral Cap Take 1 capsule (100 mg total) by mouth 2 (two) times daily. 14 capsule 0    ketoconazole 2 % External Cream Apply 1 Application topically daily. 15 g 0    Ascorbic Acid (VITAMIN C OR) Take by mouth.      montelukast (SINGULAIR) 10 MG Oral Tab Take 1 tablet (10 mg total) by mouth daily. 90 tablet 3    Glucosamine-Chondroit-Vit C-Mn (GLUCOSAMINE 1500 COMPLEX) Oral Cap Take 1,500 mg by mouth daily.      Cholecalciferol 125 MCG (5000 UT) Oral Tab Take 1 tablet (5,000 Units total) by mouth daily.      vitamin E 100 UNITS Oral Cap Take 1 capsule (100 Units total) by mouth daily.      acetaminophen 160 MG Oral Chew Tab Chew 1 tablet (160 mg total) by mouth daily as needed for Pain.      Albuterol Sulfate  (90 Base) MCG/ACT Inhalation Aero Soln Inhale 1 puff into the lungs every 6 (six) hours  as needed for Wheezing.        Past Medical History:    Asthma (HCC)    Diabetes mellitus (HCC)    GERD (gastroesophageal reflux disease)    Hypothyroid    MRSA (methicillin resistant staph aureus) culture positive    Infection to left hand/arm    Osteoarthritis    Other and unspecified hyperlipidemia    Type II or unspecified type diabetes mellitus without mention of complication, not stated as uncontrolled    Unspecified essential hypertension      Social History:  Social History     Socioeconomic History    Marital status:    Tobacco Use    Smoking status: Never    Smokeless tobacco: Never   Vaping Use    Vaping status: Never Used   Substance and Sexual Activity    Alcohol use: No     Alcohol/week: 0.0 standard drinks of alcohol    Drug use: No   Other Topics Concern    Caffeine Concern Yes     Comment: 1-2 cups daily    Exercise No     Comment: rare     Social Determinants of Health      Received from [facility], [facility]    Social Connections    Received from [facility], [facility]    Housing Stability      Past Surgical History:   Procedure Laterality Date    Appendectomy      Cholecystectomy      Hysterectomy  2005    and bl oophorectomy    Lumpectomy left      Other      bladder lift    Other      varicose vein surgery    Other  January 2014    bladder mesh repair    Other surgical history  7/10/2014    Procedure: LUMBAR FACET INJECTION;  Surgeon: Gayle Corral MD;  Location:  MAIN OR    Other surgical history  9/2/2014    Procedure: RADIOFREQUENCY ABLATION OF THORACIC OR LUMBAR MEDIAL BRANCH;  Surgeon: Gayle Corral MD;  Location:  MAIN OR    Other surgical history  9/18/2014    Procedure: RADIOFREQUENCY ABLATION OF THORACIC OR LUMBAR MEDIAL BRANCH;  Surgeon: Gayle Corral MD;  Location:  MAIN OR    Other surgical history Bilateral 3/9/2015    Procedure: SACROILIAC JOINT INJECTION BILATERAL;  Surgeon:  Gayle Corral MD;  Location:  MAIN OR    Other surgical history Right 9/3/2015    Procedure: RADIOFREQUENCY ABLATION OF THORACIC OR LUMBAR MEDIAL BRANCH;  Surgeon: Gayle Corral MD;  Location:  [facility] OR    Other surgical history Left 9/15/2015    Procedure: RADIOFREQUENCY ABLATION OF THORACIC OR LUMBAR MEDIAL BRANCH;  Surgeon: Gayle Corral MD;  Location:  [facility] OR    Other surgical history Bilateral 6/20/2016    Procedure: TRANSFORAMINAL LUMBAR EPIDURAL STEROID INJECTION SINGLE LEVEL;  Surgeon: Gayle Corral MD;  Location:  MAIN OR    Stab phlebectomy, varicose veins, 1 extremity; <20 incisions Bilateral 2000    bl vein stripping         REVIEW OF SYSTEMS:   GENERAL HEALTH: feels well otherwise  GENERAL : denies fever, chills, sweats, weight loss, weight gain  SKIN: denies any unusual skin lesions or rashes  RESPIRATORY: denies shortness of breath with exertion  NEURO: denies headaches    EXAM:   There were no vitals taken for this visit.  System Findings Details   Skin Normal Inspection - Normal.   Constitutional Normal Overall appearance - Normal.   Head/Face Normal Facial features - Normal. Eyebrows - Normal. Skull - Normal.   Oral/Oropharynx Normal Lips - Normal, Tonsils - Normal, Tongue - Normal    Nasal Normal External nose - Normal. Nasal septum - Normal, Turbinates - Normal   Neurological Normal Memory - Normal. Cranial nerves - Cranial nerves II through XII grossly intact.   Neck Exam Normal Inspection - Normal. Palpation - Normal. Parotid gland - Normal. Thyroid gland - Normal.   Psychiatric Normal Orientation - Oriented to time, place, person & situation. Appropriate mood and affect.   Lymph Detail Normal Submental. Submandibular. Anterior cervical. Posterior cervical. Supraclavicular.   Eyes Normal Conjunctiva - Right: Normal, Left: Normal. Pupil - Right: Normal, Left: Normal.    Ears Normal Inspection - Right: Normal, Left: Normal. Canal - Left: Moderate amount of debris and old  drops was cleaned by suction. TM - Right: Normal, Left: Normal.     ASSESSMENT AND PLAN:   1. Other infective acute otitis externa of left ear  At this point she appears more fungal in origin.  She will start Lotrimin solution and return in 2 weeks for recheck and further cleaning.      The patient indicates understanding of these issues and agrees to the plan.      Murphy Coleman MD  4/22/2024  12:50 PM

## 2024-04-27 ENCOUNTER — OFFICE VISIT (OUTPATIENT)
Dept: INTERNAL MEDICINE CLINIC | Facility: CLINIC | Age: 89
End: 2024-04-27
Payer: MEDICARE

## 2024-04-27 ENCOUNTER — LAB ENCOUNTER (OUTPATIENT)
Dept: LAB | Age: 89
End: 2024-04-27
Attending: PHYSICIAN ASSISTANT
Payer: MEDICARE

## 2024-04-27 VITALS
SYSTOLIC BLOOD PRESSURE: 120 MMHG | OXYGEN SATURATION: 98 % | RESPIRATION RATE: 14 BRPM | HEART RATE: 70 BPM | TEMPERATURE: 98 F | DIASTOLIC BLOOD PRESSURE: 80 MMHG

## 2024-04-27 DIAGNOSIS — I15.2 HYPERTENSION ASSOCIATED WITH DIABETES (HCC): ICD-10-CM

## 2024-04-27 DIAGNOSIS — Z79.899 ENCOUNTER FOR LONG-TERM (CURRENT) USE OF MEDICATIONS: ICD-10-CM

## 2024-04-27 DIAGNOSIS — E78.5 HYPERLIPIDEMIA ASSOCIATED WITH TYPE 2 DIABETES MELLITUS (HCC): ICD-10-CM

## 2024-04-27 DIAGNOSIS — F22 EKBOM'S DELUSIONAL PARASITOSIS (HCC): ICD-10-CM

## 2024-04-27 DIAGNOSIS — E11.29 TYPE 2 DIABETES MELLITUS WITH MICROALBUMINURIA, WITHOUT LONG-TERM CURRENT USE OF INSULIN (HCC): Primary | ICD-10-CM

## 2024-04-27 DIAGNOSIS — R80.9 TYPE 2 DIABETES MELLITUS WITH MICROALBUMINURIA, WITHOUT LONG-TERM CURRENT USE OF INSULIN (HCC): Primary | ICD-10-CM

## 2024-04-27 DIAGNOSIS — E11.69 HYPERLIPIDEMIA ASSOCIATED WITH TYPE 2 DIABETES MELLITUS (HCC): ICD-10-CM

## 2024-04-27 DIAGNOSIS — E11.59 HYPERTENSION ASSOCIATED WITH DIABETES (HCC): ICD-10-CM

## 2024-04-27 LAB
ALBUMIN SERPL-MCNC: 3.8 G/DL (ref 3.4–5)
ALBUMIN/GLOB SERPL: 1.2 {RATIO} (ref 1–2)
ALP LIVER SERPL-CCNC: 76 U/L
ALT SERPL-CCNC: 21 U/L
ANION GAP SERPL CALC-SCNC: 3 MMOL/L (ref 0–18)
AST SERPL-CCNC: 17 U/L (ref 15–37)
BASOPHILS # BLD AUTO: 0.04 X10(3) UL (ref 0–0.2)
BASOPHILS NFR BLD AUTO: 1 %
BILIRUB SERPL-MCNC: 0.4 MG/DL (ref 0.1–2)
BILIRUB UR QL STRIP.AUTO: NEGATIVE
BUN BLD-MCNC: 13 MG/DL (ref 9–23)
CALCIUM BLD-MCNC: 9.1 MG/DL (ref 8.5–10.1)
CHLORIDE SERPL-SCNC: 110 MMOL/L (ref 98–112)
CHOLEST SERPL-MCNC: 120 MG/DL (ref ?–200)
CLARITY UR REFRACT.AUTO: CLEAR
CO2 SERPL-SCNC: 28 MMOL/L (ref 21–32)
CREAT BLD-MCNC: 0.61 MG/DL
CREAT UR-SCNC: 37.2 MG/DL
EGFRCR SERPLBLD CKD-EPI 2021: 86 ML/MIN/1.73M2 (ref 60–?)
EOSINOPHIL # BLD AUTO: 0.17 X10(3) UL (ref 0–0.7)
EOSINOPHIL NFR BLD AUTO: 4 %
ERYTHROCYTE [DISTWIDTH] IN BLOOD BY AUTOMATED COUNT: 12.6 %
EST. AVERAGE GLUCOSE BLD GHB EST-MCNC: 143 MG/DL (ref 68–126)
FASTING PATIENT LIPID ANSWER: YES
FASTING STATUS PATIENT QL REPORTED: YES
GLOBULIN PLAS-MCNC: 3.3 G/DL (ref 2.8–4.4)
GLUCOSE BLD-MCNC: 104 MG/DL (ref 70–99)
GLUCOSE UR STRIP.AUTO-MCNC: NORMAL MG/DL
HBA1C MFR BLD: 6.6 % (ref ?–5.7)
HCT VFR BLD AUTO: 36.7 %
HDLC SERPL-MCNC: 63 MG/DL (ref 40–59)
HGB BLD-MCNC: 12.4 G/DL
IMM GRANULOCYTES # BLD AUTO: 0 X10(3) UL (ref 0–1)
IMM GRANULOCYTES NFR BLD: 0 %
KETONES UR STRIP.AUTO-MCNC: NEGATIVE MG/DL
LDLC SERPL CALC-MCNC: 43 MG/DL (ref ?–100)
LEUKOCYTE ESTERASE UR QL STRIP.AUTO: NEGATIVE
LYMPHOCYTES # BLD AUTO: 1.39 X10(3) UL (ref 1–4)
LYMPHOCYTES NFR BLD AUTO: 33 %
MCH RBC QN AUTO: 29.9 PG (ref 26–34)
MCHC RBC AUTO-ENTMCNC: 33.8 G/DL (ref 31–37)
MCV RBC AUTO: 88.4 FL
MICROALBUMIN UR-MCNC: 13 MG/DL
MICROALBUMIN/CREAT 24H UR-RTO: 349.5 UG/MG (ref ?–30)
MONOCYTES # BLD AUTO: 0.39 X10(3) UL (ref 0.1–1)
MONOCYTES NFR BLD AUTO: 9.3 %
NEUTROPHILS # BLD AUTO: 2.22 X10 (3) UL (ref 1.5–7.7)
NEUTROPHILS # BLD AUTO: 2.22 X10(3) UL (ref 1.5–7.7)
NEUTROPHILS NFR BLD AUTO: 52.7 %
NITRITE UR QL STRIP.AUTO: NEGATIVE
NONHDLC SERPL-MCNC: 57 MG/DL (ref ?–130)
OSMOLALITY SERPL CALC.SUM OF ELEC: 292 MOSM/KG (ref 275–295)
PH UR STRIP.AUTO: 5.5 [PH] (ref 5–8)
PLATELET # BLD AUTO: 183 10(3)UL (ref 150–450)
POTASSIUM SERPL-SCNC: 4.2 MMOL/L (ref 3.5–5.1)
PROT SERPL-MCNC: 7.1 G/DL (ref 6.4–8.2)
RBC # BLD AUTO: 4.15 X10(6)UL
SODIUM SERPL-SCNC: 141 MMOL/L (ref 136–145)
SP GR UR STRIP.AUTO: 1.01 (ref 1–1.03)
TRIGL SERPL-MCNC: 63 MG/DL (ref 30–149)
TSI SER-ACNC: 1.26 MIU/ML (ref 0.36–3.74)
UROBILINOGEN UR STRIP.AUTO-MCNC: NORMAL MG/DL
VLDLC SERPL CALC-MCNC: 9 MG/DL (ref 0–30)
WBC # BLD AUTO: 4.2 X10(3) UL (ref 4–11)

## 2024-04-27 PROCEDURE — 85025 COMPLETE CBC W/AUTO DIFF WBC: CPT

## 2024-04-27 PROCEDURE — 3008F BODY MASS INDEX DOCD: CPT | Performed by: PHYSICIAN ASSISTANT

## 2024-04-27 PROCEDURE — 80061 LIPID PANEL: CPT

## 2024-04-27 PROCEDURE — 1160F RVW MEDS BY RX/DR IN RCRD: CPT | Performed by: PHYSICIAN ASSISTANT

## 2024-04-27 PROCEDURE — 82043 UR ALBUMIN QUANTITATIVE: CPT

## 2024-04-27 PROCEDURE — 99214 OFFICE O/P EST MOD 30 MIN: CPT | Performed by: PHYSICIAN ASSISTANT

## 2024-04-27 PROCEDURE — 83036 HEMOGLOBIN GLYCOSYLATED A1C: CPT

## 2024-04-27 PROCEDURE — 82570 ASSAY OF URINE CREATININE: CPT

## 2024-04-27 PROCEDURE — 1159F MED LIST DOCD IN RCRD: CPT | Performed by: PHYSICIAN ASSISTANT

## 2024-04-27 PROCEDURE — 84443 ASSAY THYROID STIM HORMONE: CPT

## 2024-04-27 PROCEDURE — 3079F DIAST BP 80-89 MM HG: CPT | Performed by: PHYSICIAN ASSISTANT

## 2024-04-27 PROCEDURE — 36415 COLL VENOUS BLD VENIPUNCTURE: CPT

## 2024-04-27 PROCEDURE — 81001 URINALYSIS AUTO W/SCOPE: CPT

## 2024-04-27 PROCEDURE — 80053 COMPREHEN METABOLIC PANEL: CPT

## 2024-04-27 PROCEDURE — 3074F SYST BP LT 130 MM HG: CPT | Performed by: PHYSICIAN ASSISTANT

## 2024-04-27 NOTE — PROGRESS NOTES
Dana Iraheta is a 89 year old female.  HPI:   Pt was afraid to use clotrimazole drops prescribed by ENT because she read 'for external use only' on the drug package insert. She has f/up with ENT scheduled for 2 wks    Otherwise in good health. DM has been controlled.   She still has feeling of tiny bugs or ants pinching her, and states she has found some. She has been evaluated by psychiatry, dermatology, and family members have also confirmed that there are no actual insects.  She has brought us samples in the past of 'bugs' that were pieces of her skin.  She has been treated with permethrin, ketoconazole, hydrocortisone topically. She has also been weaned off of opioids successfully, now on gabapentin for chronic back pain    Current Outpatient Medications   Medication Sig Dispense Refill    clotrimazole 1 % External Solution Apply to affected ear 4 drops three times daily 10 mL 0    [START ON 5/4/2024] gabapentin 300 MG Oral Cap Take 1 capsule (300 mg total) by mouth 3 (three) times daily. 90 capsule 0    Omeprazole 40 MG Oral Capsule Delayed Release Take 1 capsule (40 mg total) by mouth every morning. 90 capsule 0    LEVOTHYROXINE 112 MCG Oral Tab TAKE 1 TABLET BY MOUTH DAILY BEFORE MEAL 90 tablet 0    ATORVASTATIN 40 MG Oral Tab TAKE 1 TABLET BY MOUTH DAILY IN EVENING 90 tablet 0    VALSARTAN 80 MG Oral Tab TAKE 1 TABLET BY MOUTH DAILY IN THE MORNING **FOR BLOOD PRESSURE** 90 tablet 0    HYDROcodone-acetaminophen 5-325 MG Oral Tab Take 1 tablet by mouth every 8 (eight) hours as needed for Pain. 90 tablet 0    ofloxacin 0.3 % Otic Solution Take 5 drops twice a day 1 each 1    doxycycline 100 MG Oral Cap Take 1 capsule (100 mg total) by mouth 2 (two) times daily. 14 capsule 0    ketoconazole 2 % External Cream Apply 1 Application topically daily. 15 g 0    Ascorbic Acid (VITAMIN C OR) Take by mouth.      montelukast (SINGULAIR) 10 MG Oral Tab Take 1 tablet (10 mg total) by mouth daily. 90 tablet 3     Glucosamine-Chondroit-Vit C-Mn (GLUCOSAMINE 1500 COMPLEX) Oral Cap Take 1,500 mg by mouth daily.      Cholecalciferol 125 MCG (5000 UT) Oral Tab Take 1 tablet (5,000 Units total) by mouth daily.      vitamin E 100 UNITS Oral Cap Take 1 capsule (100 Units total) by mouth daily.      acetaminophen 160 MG Oral Chew Tab Chew 1 tablet (160 mg total) by mouth daily as needed for Pain.      Albuterol Sulfate  (90 Base) MCG/ACT Inhalation Aero Soln Inhale 1 puff into the lungs every 6 (six) hours as needed for Wheezing.        Past Medical History:    Asthma (HCC)    Diabetes mellitus (HCC)    GERD (gastroesophageal reflux disease)    Hypothyroid    MRSA (methicillin resistant staph aureus) culture positive    Infection to left hand/arm    Osteoarthritis    Other and unspecified hyperlipidemia    Type II or unspecified type diabetes mellitus without mention of complication, not stated as uncontrolled    Unspecified essential hypertension      Social History:  Social History     Socioeconomic History    Marital status:    Tobacco Use    Smoking status: Never    Smokeless tobacco: Never   Vaping Use    Vaping status: Never Used   Substance and Sexual Activity    Alcohol use: No     Alcohol/week: 0.0 standard drinks of alcohol    Drug use: No   Other Topics Concern    Caffeine Concern Yes     Comment: 1-2 cups daily    Exercise No     Comment: rare     Social Determinants of Health      Received from [facility], [facility]    Social Connections    Received from [facility], [facility]    Housing Stability        REVIEW OF SYSTEMS:   GENERAL HEALTH: feels well otherwise. Denies fever, chills, unintentional weight change  SKIN: denies any unusual skin lesions or rashes  RESPIRATORY: denies shortness of breath with exertion, denies cough or wheezing  CARDIOVASCULAR: denies chest pain or palpitations, denies leg swelling  GI: denies  abdominal pain and denies heartburn. Denies nausea, vomiting, diarrhea, constipation  NEURO: denies headaches, dizziness, weakness, syncope    EXAM:   /80   Pulse 70   Temp 98 °F (36.7 °C)   Resp 14   SpO2 98%   GENERAL: well developed, well nourished,in no apparent distress  SKIN: no rashes,no suspicious lesions, warm and dry  HEENT: atraumatic, normocephalic,ears and throat are clear  NECK: supple,no adenopathy, no thyromegaly  LUNGS: clear to auscultation b/l no W/R/R  CARDIO: RRR without murmur  GI: good BS's,no masses, HSM, distension or tenderness  EXTREMITIES: no cyanosis, clubbing or edema  MUSCULOSKELETAL: FROM, no joint swelling or bony tenderness  NEURO: a/ox3, no focal deficits  PSYCH: mood and affect normal  Bilateral barefoot skin diabetic exam is normal, visualized feet and the appearance is normal.  Bilateral monofilament/sensation of both feet is normal.  Pulsation pedal pulse exam of both lower legs/feet is normal as well.       ASSESSMENT AND PLAN:   1. Type 2 diabetes mellitus with microalbuminuria, without long-term current use of insulin (HCC) (Primary)  -     OPHTHALMOLOGY - INTERNAL  See ophthalmology for eye exam. Check labs as ordered. Continue current med therapy   2. Hypertension associated with diabetes (HCC)  At goal cpm  3. Hyperlipidemia associated with type 2 diabetes mellitus (HCC)  Continue statin, check labs  4. Encounter for long-term (current) use of medications  -     CBC With Differential With Platelet; Future; Expected date: 04/27/2024  -     Comp Metabolic Panel (14); Future; Expected date: 04/27/2024  -     Hemoglobin A1C; Future; Expected date: 04/27/2024  -     Lipid Panel; Future; Expected date: 04/27/2024  -     TSH W Reflex To Free T4; Future; Expected date: 04/27/2024  -     Microalb/Creat Ratio, Random Urine; Future; Expected date: 04/27/2024  -     Urinalysis, Routine; Future; Expected date: 04/27/2024   5. Ekbom's delusional parasitosis (HCC)  > 5 yrs  persistent report of insects or ants found on her skin, ruled out by derm, PCP on several occasions. Admits to some anxiety, declines treatment for now.     The patient indicates understanding of these issues and agrees to the plan.  Return in about 6 months (around 10/27/2024) for routine physical.

## 2024-04-28 PROBLEM — L98.8 MORGELLONS SYNDROME: Status: RESOLVED | Noted: 2024-04-28 | Resolved: 2024-04-28

## 2024-04-28 PROBLEM — F22 EKBOM'S DELUSIONAL PARASITOSIS (HCC): Status: ACTIVE | Noted: 2024-04-28

## 2024-04-28 PROBLEM — L98.8 MORGELLONS SYNDROME: Status: ACTIVE | Noted: 2024-04-28

## 2024-05-03 DIAGNOSIS — M51.36 DDD (DEGENERATIVE DISC DISEASE), LUMBAR: ICD-10-CM

## 2024-05-03 DIAGNOSIS — Z79.891 CHRONIC PRESCRIPTION OPIATE USE: ICD-10-CM

## 2024-05-03 DIAGNOSIS — M54.16 LUMBAR RADICULOPATHY: ICD-10-CM

## 2024-05-03 DIAGNOSIS — Z79.891 LONG TERM (CURRENT) USE OF OPIATE ANALGESIC: ICD-10-CM

## 2024-05-03 DIAGNOSIS — M43.07 LUMBOSACRAL SPONDYLOLYSIS: ICD-10-CM

## 2024-05-03 DIAGNOSIS — M47.816 LUMBAR SPONDYLOSIS: ICD-10-CM

## 2024-05-03 DIAGNOSIS — F11.90 CHRONIC NARCOTIC USE: ICD-10-CM

## 2024-05-03 RX ORDER — HYDROCODONE BITARTRATE AND ACETAMINOPHEN 5; 325 MG/1; MG/1
1 TABLET ORAL EVERY 8 HOURS PRN
Qty: 90 TABLET | Refills: 0 | Status: SHIPPED | OUTPATIENT
Start: 2024-05-07 | End: 2024-06-06

## 2024-05-03 NOTE — TELEPHONE ENCOUNTER
Patient calling to request refill of gabapentin 300 MG Oral Cap AND HYDROcodone-acetaminophen 5-325 MG Oral Tab   Pharmacy [id number] [place] [address] [address] AT [facility] [address], [phone], [phone]     Patient informed of 48 hour refill policy excluding weekends and holidays.  Informed patient prescription is sent directly to pharmacy.    Further explained patient will not receive a call back once prescription is ready.

## 2024-05-03 NOTE — TELEPHONE ENCOUNTER
Medication: HYDROcodone-acetaminophen 5-325 MG     Date of last refill: 04/07/24  [place] 04[place]07/24    Last office visit: 02/05/24  Due back to clinic per last office note:  3 months  Date next office visit scheduled:  05/06/24    Last URINE Screen: 02/10/24  Screen Results:  as expected      Narcotic Contract EXPIRATION date: 11/03/24    Last OV note recommendation:   Plan:   > Update urine drug screen today  > Refill gabapentin 300 mg 3 times daily and hydrocodone 5/325 3 times daily as needed for pain  > Follow-up in 3 months      Orders Placed This Encounter

## 2024-05-06 ENCOUNTER — OFFICE VISIT (OUTPATIENT)
Dept: PAIN CLINIC | Facility: CLINIC | Age: 89
End: 2024-05-06
Payer: MEDICARE

## 2024-05-06 VITALS — DIASTOLIC BLOOD PRESSURE: 70 MMHG | SYSTOLIC BLOOD PRESSURE: 124 MMHG | HEART RATE: 61 BPM | OXYGEN SATURATION: 96 %

## 2024-05-06 DIAGNOSIS — M51.36 DDD (DEGENERATIVE DISC DISEASE), LUMBAR: ICD-10-CM

## 2024-05-06 DIAGNOSIS — Z79.891 LONG TERM (CURRENT) USE OF OPIATE ANALGESIC: ICD-10-CM

## 2024-05-06 DIAGNOSIS — M54.16 LUMBAR RADICULOPATHY: Primary | ICD-10-CM

## 2024-05-06 PROCEDURE — 3074F SYST BP LT 130 MM HG: CPT | Performed by: NURSE PRACTITIONER

## 2024-05-06 PROCEDURE — 3078F DIAST BP <80 MM HG: CPT | Performed by: NURSE PRACTITIONER

## 2024-05-06 PROCEDURE — 1159F MED LIST DOCD IN RCRD: CPT | Performed by: NURSE PRACTITIONER

## 2024-05-06 PROCEDURE — 99214 OFFICE O/P EST MOD 30 MIN: CPT | Performed by: NURSE PRACTITIONER

## 2024-05-06 NOTE — PATIENT INSTRUCTIONS
Refill policies:    Allow 2-3 business days for refills; controlled substances may take longer.  Contact your pharmacy at least 5 days prior to running out of medication and have them send an electronic request or submit request through the “request refill” option in your [employer] account.  Refills are not addressed on weekends; covering physicians do not authorize routine medications on weekends.  No narcotics or controlled substances are refilled after noon on Fridays or by on call physicians.  By law, narcotics must be electronically prescribed.  A 30 day supply with no refills is the maximum allowed.  If your prescription is due for a refill, you may be due for a follow up appointment.  To best provide you care, patients receiving routine medications need to be seen at least once a year.  Patients receiving narcotic/controlled substance medications need to be seen at least once every 3 months.  In the event that your preferred pharmacy does not have the requested medication in stock (e.g. Backordered), it is your responsibility to find another pharmacy that has the requested medication available.  We will gladly send a new prescription to that pharmacy at your request.    Scheduling Tests:    If your physician has ordered radiology tests such as MRI or CT scans, please contact Central Scheduling at [phone] right away to schedule the test.  Once scheduled, the [facility] Centralized Referral Team will work with your insurance carrier to obtain pre-certification or prior authorization.  Depending on your insurance carrier, approval may take 3-10 days.  It is highly recommended patients assure they have received an authorization before having a test performed.  If test is done without insurance authorization, patient may be responsible for the entire amount billed.      Precertification and Prior Authorizations:  If your physician has recommended that you have a procedure or additional testing performed the [facility]  Centralized Referral Team will contact your insurance carrier to obtain pre-certification or prior authorization.    You are strongly encouraged to contact your insurance carrier to verify that your procedure/test has been approved and is a COVERED benefit.  Although the [facility] Centralized Referral Team does its due diligence, the insurance carrier gives the disclaimer that \"Although the procedure is authorized, this does not guarantee payment.\"    Ultimately the patient is responsible for payment.   Thank you for your understanding in this matter.  Paperwork Completion:  If you require FMLA or disability paperwork for your recovery, please make sure to either drop it off or have it faxed to our office at [phone]. Be sure the form has your name and date of birth on it.  The form will be faxed to our Forms Department and they will complete it for you.  There is a 25$ fee for all forms that need to be filled out.  Please be aware there is a 10-14 day turnaround time.  You will need to sign a release of information (HARSHIL) form if your paperwork does not come with one.  You may call the Forms Department with any questions at [phone].  Their fax number is [phone].

## 2024-05-06 NOTE — PROGRESS NOTES
HPI:   Dana Iraheta is a 88 year old female with a history of chronic low back pain here for medication review.  From a symptom standpoint, complains of pain across the back which is deep and aching as well as bilateral lower extremity radicular symptoms with numbness in her feet left greater than right.  This is managed with hydrocodone 5/325 3 times daily as well as gabapentin 300 mg 3 times daily.  Patient states hydrocodone continues to provide improved functional ability and analgesia.  The gabapentin also helps especially at nighttime.      Dana Iraheta presents with complaints of leg pain Left > right/she also has pain in her low back and her neck.    Patient was last seen 11/2023 by  and this provider 2/5/24    The pain is described as moderate aching, stabbing, grabbing, shooting, soreness that is constant .  The patient’s activity level has decreased since last visit.  The pain is worst unrelated to time of day.    Changes in condition/history since last visit: [place] and she is concerned    The following activities will increase the patient’s pain: walking, standing, sitting, lifting, climbing stairs, household chores, outside yard work, staying in the same position for prolonged periods of time, transitioning, any prolonged activity    The following activities decrease the patient’s pain: medications, lying down    Functional Assessment: Patient reports that they are able to complete all of their ADL's such as eating, bathing, using the toilet, dressing and getting up from a bed or a chair independently.    Patient does live alone.  She uses her walker in her home.  She states that her 2 daughters do help her on an as-needed basis    Past Medical History:   Diagnosis Date    Asthma (HCC)     Diabetes mellitus (HCC)     GERD (gastroesophageal reflux disease)     Hypothyroid     MRSA (methicillin resistant staph aureus) culture positive 07/2016    Infection to left  hand/arm    Osteoarthritis     Other and unspecified hyperlipidemia     Type II or unspecified type diabetes mellitus without mention of complication, not stated as uncontrolled     Unspecified essential hypertension         Past Surgical History:   Procedure Laterality Date    Appendectomy      Cholecystectomy      Hysterectomy  2005    and bl oophorectomy    Lumpectomy left      Other      bladder lift    Other      varicose vein surgery    Other  January 2014    bladder mesh repair    Other surgical history  7/10/2014    Procedure: LUMBAR FACET INJECTION;  Surgeon: Gayle Corral MD;  Location: [facility]    Other surgical history  9/2/2014    Procedure: RADIOFREQUENCY ABLATION OF THORACIC OR LUMBAR MEDIAL BRANCH;  Surgeon: Gayle Corral MD;  Location: [facility]    Other surgical history  9/18/2014    Procedure: RADIOFREQUENCY ABLATION OF THORACIC OR LUMBAR MEDIAL BRANCH;  Surgeon: Gayle Corral MD;  Location: [facility]    Other surgical history Bilateral 3/9/2015    Procedure: SACROILIAC JOINT INJECTION BILATERAL;  Surgeon: Gayle Corral MD;  Location: [facility]    Other surgical history Right 9/3/2015    Procedure: RADIOFREQUENCY ABLATION OF THORACIC OR LUMBAR MEDIAL BRANCH;  Surgeon: Gayle Corral MD;  Location: [facility]    Other surgical history Left 9/15/2015    Procedure: RADIOFREQUENCY ABLATION OF THORACIC OR LUMBAR MEDIAL BRANCH;  Surgeon: Gayle Corral MD;  Location: [facility]    Other surgical history Bilateral 6/20/2016    Procedure: TRANSFORAMINAL LUMBAR EPIDURAL STEROID INJECTION SINGLE LEVEL;  Surgeon: Gayle Corral MD;  Location: [facility]    Stab phlebectomy, varicose veins, 1 extremity; <20 incisions Bilateral 2000    bl vein stripping        Current Medications:  Current Outpatient Medications   Medication Sig Dispense Refill    [START ON 5/7/2024] HYDROcodone-acetaminophen 5-325 MG Oral Tab Take 1 tablet by mouth every 8 (eight) hours as needed for Pain. 90  tablet 0    clotrimazole 1 % External Solution Apply to affected ear 4 drops three times daily 10 mL 0    gabapentin 300 MG Oral Cap Take 1 capsule (300 mg total) by mouth 3 (three) times daily. 90 capsule 0    Omeprazole 40 MG Oral Capsule Delayed Release Take 1 capsule (40 mg total) by mouth every morning. 90 capsule 0    LEVOTHYROXINE 112 MCG Oral Tab TAKE 1 TABLET BY MOUTH DAILY BEFORE MEAL 90 tablet 0    ATORVASTATIN 40 MG Oral Tab TAKE 1 TABLET BY MOUTH DAILY IN EVENING 90 tablet 0    VALSARTAN 80 MG Oral Tab TAKE 1 TABLET BY MOUTH DAILY IN THE MORNING **FOR BLOOD PRESSURE** 90 tablet 0    ofloxacin 0.3 % Otic Solution Take 5 drops twice a day 1 each 1    doxycycline 100 MG Oral Cap Take 1 capsule (100 mg total) by mouth 2 (two) times daily. 14 capsule 0    ketoconazole 2 % External Cream Apply 1 Application topically daily. 15 g 0    Ascorbic Acid (VITAMIN C OR) Take by mouth.      montelukast (SINGULAIR) 10 MG Oral Tab Take 1 tablet (10 mg total) by mouth daily. 90 tablet 3    Glucosamine-Chondroit-Vit C-Mn (GLUCOSAMINE 1500 COMPLEX) Oral Cap Take 1,500 mg by mouth daily.      Cholecalciferol 125 MCG (5000 UT) Oral Tab Take 1 tablet (5,000 Units total) by mouth daily.      vitamin E 100 UNITS Oral Cap Take 1 capsule (100 Units total) by mouth daily.      acetaminophen 160 MG Oral Chew Tab Chew 1 tablet (160 mg total) by mouth daily as needed for Pain.      Albuterol Sulfate  (90 Base) MCG/ACT Inhalation Aero Soln Inhale 1 puff into the lungs every 6 (six) hours as needed for Wheezing.        Patient requires assistance with: driving, walking up or down stairs, shopping, household chores (laundry, dishes, vacuuming, etc), outdoor yard work (mowing lawn, trimming, raking, gardening, etc)    Patient lives alone/she has children that help her out.    Have you recently had any feelings of harming yourself or others? The patient's response was no.    Physical Exam:   /70 (BP Location: Right arm,  Patient Position: Sitting)   Pulse 61   SpO2 96%   VAS Pain Score:  7/10  General Appearance: Well developed, well nourished, normal build, independent body habitus, no apparent physical disabilities, well groomed, Ambulates with walker    Neurological Exam: WNL-Orientation to time, place and person, normal mood & effect, normal concentration & attention span  Inspection:   Nonantalgic  Gross motor or sensory deficits  Ambulating with walker slow steady pace  Strength bilateral upper and lower extremities 5 out of 5  Negative swelling to bilateral lower extremities  DPs 2+ bilaterally  Radiology/Lab Test Reviewed: No new images to review  Last urine drug screen in November was within normal limits  PROCEDURE:  MRI SPINE LUMBAR (CPT=72148)     COMPARISON:  None.     INDICATIONS:  M54.16 Radiculopathy, lumbar region     TECHNIQUE:  Multiplanar T1 and T2 weighted images including fat suppression sequences.  Images acquired in sagittal and axial planes.       PATIENT STATED HISTORY: (As transcribed by Technologist)  Patient has severe low back pain that radiates down bilateral legs.      FINDINGS:  Rightward curvature of the thoracolumbar spine.  There is normal lumbar lordosis with trace retrolisthesis of L1 on L2 by 4 mm and L2 on L3 by 3 mm.  There is trace anterolisthesis of L5 on S1 by 5 mm..  Vertebral body heights are well-maintained.    Degenerative disc space loss, disc desiccation, endplate spurring, and degenerative endplate marrow signal changes throughout the lumbar spine, most pronounced at L1-2.  No focal worrisome marrow signal abnormality is seen.     The distal spinal cord and conus medullaris have a normal signal and morphology.  The conus medullaris terminates at the lower L1 level.  The roots of the cauda equina are unremarkable.  No focal mass or fluid collection is seen in the lumbar spinal  canal.  The paraspinal soft tissues are unremarkable.  Partially imaged probable renal cysts.     T12-L1:   Diffuse disc bulge with endplate osteophytes and mild facet arthropathy. There is no significant spinal canal or neural foraminal stenosis.     L1-2:  Diffuse disc bulge with endplate osteophytes and a superimposed left foraminal/far lateral disc protrusion.  Mild facet arthropathy.  No significant spinal canal stenosis.  Mild right and moderate left neural foraminal stenosis.     L2-3:  Diffuse disc bulge with endplate osteophytes.  Mild facet arthropathy with thickening of ligamentum flavum.  No significant spinal canal stenosis.  Moderate bilateral neural foraminal stenosis.     L3-4:  Diffuse disc bulge with endplate osteophytes.  Mild to moderate facet arthropathy with thickening of ligamentum flavum.  No significant spinal canal stenosis.  Mild bilateral neural foraminal stenosis.     L4-5:  Diffuse disc bulge with endplate osteophytes.  Mild facet arthropathy with thickening of ligamentum flavum.  No significant spinal canal stenosis.  Mild bilateral neural foraminal stenosis.     L5-S1:  There is uncovering of the intervertebral disc with a diffuse disc bulge and endplate osteophytes.  There is moderate to severe facet arthropathy.  There is no significant spinal canal stenosis.  There is mild to moderate right and mild left  neural foraminal stenosis.        Impression  CONCLUSION:       1. Multilevel degenerative changes in the lumbar spine are seen without significant spinal canal stenosis at any level.     2. Mild right and moderate left neural foraminal stenosis at L1-2.  Moderate bilateral neural foraminal stenosis at L2-3.  Mild bilateral neural foraminal stenosis at L3-4 and L4-5.  Mild to moderate right and mild left neural foraminal stenosis at  L5-S1.     3. Facet arthropathy throughout the lumbar spine, most pronounced at L5-S1.     Please see above for further details.        Dictated by: Navneet Mckay MD on 2/21/2020 at 14:24      Approved by: Navneet Mckay MD on 2/21/2020 at 14:28           Lab Results   Component Value Date    WBC 4.2 04/27/2024    WBC 5.2 10/02/2023    WBC 5.0 03/08/2022   No results found for: \"HEMOGLOBIN\"  Lab Results   Component Value Date    .0 04/27/2024    .0 10/02/2023    .0 03/08/2022         Patient Education: Patient was advised to continue normal activities as tolerated and was advised against any form of bedrest, since recent guidelines promote and encourage physical activity for improvment of functionality and overall pain.  (Family Practice Vol. 16, No. 1, 39-45Â© [employer] 1999 ).    The patient is on long term opioids and reports  Analgesia: good (provide pain relief)  Anesthesia: none (sedation response)  Adverse effect: none  ADLs: improved (improved or increased dysfunction)  Aberrant use behavior: none (missed appointment/lost meds/early refills)      Complementary approaches to help manage pain: OTC/advil     Patient provided education on risks of chronic opioid use including but not limited to the risk for:   Constipation, nausea, vomiting, respiratory depression, sleep disordered breathing, impaired cognition, somnolence, mental cloudiness, sedation especially if used in combination with other sedating medications such as zdrugs, benzodiazepines, muscle relaxants, alcohol, THC products, and neuropathic medications.  In addition chronic opioid use can result in urinary retention, pruritis, endocrine alterations including weight gain, poor glycemic control, mood disorders, depression, altered sexual function, and weakened bones, making fractures a greater risk.  Also discussed risk for opioid induced neurotoxicity, increased risk for infections such as pneumonia as opioids have been known to reduce immune response.      Reference:  VITALIY Garcia, ELEANOR Gerard, ALEKS Gore (2022) Up-to-date: Prevention and management of side effects in patients receiving opioids for chronic pain     The patient had their urine drug screen as  required by their care contract and this was reviewed with them today.    Discussion regarding the addictive nature of the opioid medications and the patients' likelihood of being physically vs. Psychologically addicted  Discussion regarding current morphine equivalents which at this time is 15 MEDD and mutual goals addressed and implemented     The patient acknowledges the understanding of the addictive nature of this med AND the issue of possible tolerance and/or unintentional over use/dose  The patient acknowledges the concomitant use of other medications in particular anti-anxiety/benzodiazepines or sleeping medications can have untoward interactions with opiates and accepts that risks of the same up to and including possible hospitalization and or death .    The patient has been informed of the level of opiate currently being taken is not enough to warrant antidote medication/narcan     Suggested a weaning trial to see if we can remove the medication from the patients active meds, however at this time the patient does not want to pursue weaning or cessation of the medication at this time reporting that the medications are significantly improving their quality of life and ability to maintain all activities of daily living and are not resulting in any significant side effect.    The issue of addiction and dependence will be discussed routinely with the patient and the patient understands the need for this discussion and is agreeable to the same    Patient educated and verbalized understanding.  Medical Decision Making:   Diagnosis:    Encounter Diagnoses   Name Primary?    Lumbar radiculopathy Yes    Long term (current) use of opiate analgesic     DDD (degenerative disc disease), lumbar        Impression:   Patient is an 88-year-old female with chronic back pain bilateral radicular symptoms.  Patient uses hydrocodone 5/325 3 times daily as needed along with gabapentin 300 mg 3 times daily.  Patient has  longstanding history of chronic back pain and feels that the medications help manage her symptoms.    Patient is reporting numbness in bilateral feet left greater than right.  Patient has not had a lumbar epidural steroid injection since 2020.  She would be open to repeating her epidural steroid injection reporting that she has 50% back and 50% leg symptoms however she needs to check with her daughters.    Plan:   > Recommend lumbar epidural steroid injection for back and leg symptoms  > Refill gabapentin 300 mg 3 times daily and hydrocodone 5/325 3 times daily as needed for pain: Continue as prescribed  > Follow-up in 3 months for medication follow-up and management  Orders Placed This Encounter   Procedures    [facility] PAIN NAVIGATOR       Meds & Refills for this Visit:  Requested Prescriptions      No prescriptions requested or ordered in this encounter       Imaging & Consults:  None    The patient indicates understanding of these issues and agrees to the plan.      ID#[id number]

## 2024-05-06 NOTE — PROGRESS NOTES
Patient presents in office today with reported pain in bilateral lower back, bilateral lower extremities (L>R)    Current pain level reported = 7/10    Last reported dose of Norco and Gabapentin around 7am    Narcotic Contract renewal 10/3/24    Urine Drug screen 2/6/24  Loida Bustamante, SUZAN  2/18/2024 11:27 AM CST Back to Top      As expected based on prescribed meds

## 2024-05-07 ENCOUNTER — TELEPHONE (OUTPATIENT)
Dept: PAIN CLINIC | Facility: CLINIC | Age: 89
End: 2024-05-07

## 2024-05-07 DIAGNOSIS — M43.07 LUMBOSACRAL SPONDYLOLYSIS: ICD-10-CM

## 2024-05-07 DIAGNOSIS — M54.16 LUMBAR RADICULOPATHY: Primary | ICD-10-CM

## 2024-05-07 DIAGNOSIS — M51.36 DDD (DEGENERATIVE DISC DISEASE), LUMBAR: ICD-10-CM

## 2024-05-07 NOTE — TELEPHONE ENCOUNTER
Spoke with patient's daughter Maggie (Ok'd per HIPAA) in regards to injection, per Maggie she is at work and will call back to schedule.

## 2024-05-07 NOTE — TELEPHONE ENCOUNTER
Prior authorization request completed for: RODRICK  Authorization # [id number]   Authorization dates: 5/7/24-7/5/24  CPT codes approved: 37186  Number of visits/dates of service approved: 1  Physician: JEREMY  Location: [facility]     Patient can be scheduled. Routed to Navigator.

## 2024-05-13 NOTE — TELEPHONE ENCOUNTER
Call transferred from PSR - patient calling to schedule injection.       Patient advised of insurance approval to proceed with injections and is agreeable to scheduling. Patient scheduled for procedure, pre-procedure instructions reviewed. Patient prefers Local sedation. Reviewed sedation instructions including No Fasting and No  Required. Patient encouraged but not required to hold Vitamin E for 5 days prior to procedure. Patient verbalized understanding of instructions, no further needs at this time.    Patient mentioned that she is currently on antibiotics for ear infection (she will complete in a couple of days), if that will interfere with her scheduled injection? Advised patient that she must complete antibiotics prior to injection. If she is still on antibiotics on 05/28 injection will need to be rescheduled. Patient VU.      [facility] PAIN CLINIC  PRE-PROCEDURE INSTRUCTIONS WITHOUT SEDATION    Procedure: LESI       Appointment Date: 05/28/2024  Check-In Time: 10:45 AM      Prior to the procedure:  Please update us prior to the procedure if you are experiencing any symptoms of infection such as cough, fever, chills, urinary symptoms, or have recently been prescribed antibiotics, have open wounds, have recently had surgery or dental procedures.    Day of Procedure:  **Drivers will be required for patients who receive prescriptions for Valium.    NO FASTING REQUIRED  Please bring your Insurance Card, Photo ID, List of Current Medications and Referral (if applicable) to your appointment.  Please park in the [employer] [facility] and follow the signs to the [facility].  Check in at [facility] ([address] [place] [address]) outpatient registration in the [facility].  Please note-No prescriptions will be written by Pain Clinic in [place] on the day of procedure. If you require a refill of medications, please contact the office 48 hours prior to your procedure.  If you have an implanted Spinal  Cord or Peripheral Nerve Stimulator: Please remember to turn device off for procedure.        Medication Hold:    Number of days you need to be off for the following medications:    Aggrenox 10 days   Agrylin (Anagrelide) 10 days  Brilinta (Ticagrelor) 7 days  Imbruvica (Ibrutinib) 3 days   Enbrel (Etanercept) 24 hours   Fragmin (Dalteparin) 24 hours   Pletal (Cilostazol) 7 days  Effient (Prasugrel) 7 days  Pradaxa 10 days  Trental 7 days  Eliquis (Apixaban) 3 days  Xarelto (Rivaroxaban) 3 days  Lovenox (Enoxaparin) 24 hours  Aspirin  Greater than 81mg but less than 325mg   5 days  325mg and greater                  7 days  Coumadin       5 days  Procedure may be cancelled if INR is elevated.   Excedrin (with aspirin) 7 days  Plavix (Clopidogrel)                            7 days    NSAIDs: 24 hours preferred      Ibuprofen (Motrin, Advil, Vicoprofen), Naproxen (Naprosyn, Aleve), Piroxcam (Feldene), Meloxicam (Mobic), Oxaprozin (Daypro), Diclofenac (Voltaren), Indomethacin (Indocin), Etodolac (Lodine), Nabumetone (Relafen), Celebrex (Celecoxib)           HERBAL SUPPLEMENTS  5 days preferred  Fish oil, krill oil, Omega-3, Vascepa, Vitamin E, Turmeric, Garlic                       Insurance Authorization:   Most insurances are now requiring a preauthorization for all procedures.  In the event that your insurance does not authorize your procedure within 48 hours of the scheduled date, your procedure will be cancelled and rescheduled to a later date.  Please contact your insurance carrier to determine what your financial responsibility will be for the procedure(s).      Cancellation/Rescheduling Appointment:   In the event you need to cancel or reschedule your appointment, you must notify the office 24 hours prior.    Post-procedure instructions:        Please schedule a follow up visit within 2 to 4 weeks after your last procedure date   Please call our office with any questions or concerns before or after your  procedure at  [phone].  If you are a diabetic, please increase the frequency of your glucose monitoring after the procedure as this may cause a temporary increase in your blood sugar.  Contact your primary care physician if your blood sugar rises as you may require some medication adjustment.  It is normal to have increased pain at injection site for up to 3-5 days after procedure, you can use heat or ice (20 minutes on 20 minutes off) for comfort.    **To hear a recorded version of these instructions, please call [phone] and follow the prompts.  **Para escuchar las instrucciones en Español, por favor de llamar el franko [phone] opción 4.

## 2024-05-28 ENCOUNTER — HOSPITAL ENCOUNTER (OUTPATIENT)
Facility: HOSPITAL | Age: 89
Setting detail: HOSPITAL OUTPATIENT SURGERY
Discharge: HOME | End: 2024-05-28
Attending: ANESTHESIOLOGY | Admitting: ANESTHESIOLOGY

## 2024-05-28 ENCOUNTER — APPOINTMENT (OUTPATIENT)
Dept: GENERAL RADIOLOGY | Facility: HOSPITAL | Age: 89
End: 2024-05-28
Attending: ANESTHESIOLOGY

## 2024-05-28 VITALS
RESPIRATION RATE: 18 BRPM | SYSTOLIC BLOOD PRESSURE: 153 MMHG | TEMPERATURE: 97 F | WEIGHT: 153 LBS | OXYGEN SATURATION: 97 % | BODY MASS INDEX: 30.84 KG/M2 | HEIGHT: 59 IN | DIASTOLIC BLOOD PRESSURE: 77 MMHG | HEART RATE: 64 BPM

## 2024-05-28 LAB — GLUCOSE BLD-MCNC: 90 MG/DL (ref 70–99)

## 2024-05-28 PROCEDURE — 3E0R33Z INTRODUCTION OF ANTI-INFLAMMATORY INTO SPINAL CANAL, PERCUTANEOUS APPROACH: ICD-10-PCS | Performed by: ANESTHESIOLOGY

## 2024-05-28 PROCEDURE — 62323 NJX INTERLAMINAR LMBR/SAC: CPT | Performed by: ANESTHESIOLOGY

## 2024-05-28 RX ORDER — DEXAMETHASONE SODIUM PHOSPHATE 10 MG/ML
INJECTION, SOLUTION INTRAMUSCULAR; INTRAVENOUS
Status: DISCONTINUED | OUTPATIENT
Start: 2024-05-28 | End: 2024-05-28

## 2024-05-28 RX ORDER — SODIUM CHLORIDE, SODIUM LACTATE, POTASSIUM CHLORIDE, CALCIUM CHLORIDE 600; 310; 30; 20 MG/100ML; MG/100ML; MG/100ML; MG/100ML
100 INJECTION, SOLUTION INTRAVENOUS CONTINUOUS
Status: CANCELLED | OUTPATIENT
Start: 2024-05-28

## 2024-05-28 RX ORDER — SODIUM CHLORIDE 9 MG/ML
INJECTION, SOLUTION INTRAMUSCULAR; INTRAVENOUS; SUBCUTANEOUS
Status: DISCONTINUED | OUTPATIENT
Start: 2024-05-28 | End: 2024-05-28

## 2024-05-28 RX ORDER — LIDOCAINE HYDROCHLORIDE 10 MG/ML
INJECTION, SOLUTION EPIDURAL; INFILTRATION; INTRACAUDAL; PERINEURAL
Status: DISCONTINUED | OUTPATIENT
Start: 2024-05-28 | End: 2024-05-28

## 2024-05-28 RX ORDER — ONDANSETRON 2 MG/ML
4 INJECTION INTRAMUSCULAR; INTRAVENOUS ONCE AS NEEDED
Status: CANCELLED | OUTPATIENT
Start: 2024-05-28 | End: 2024-05-28

## 2024-05-28 NOTE — H&P
History & Physical Examination    Patient Name: Dana Iraheta  MRN: [id number]  CSN: [id number]  YOB: 1935    Pre-Operative Diagnosis:  Lumbar radiculopathy [M54.16]  DDD (degenerative disc disease), lumbar [M51.36]  Lumbosacral spondylolysis [M43.07]    Present Illness: Lumbar radiculopathy    ASA: 2  MP class: 1  Sedation: Local     Medications Prior to Admission   Medication Sig Dispense Refill Last Dose    HYDROcodone-acetaminophen 5-325 MG Oral Tab Take 1 tablet by mouth every 8 (eight) hours as needed for Pain. 90 tablet 0     [] clotrimazole 1 % External Solution Apply to affected ear 4 drops three times daily 10 mL 0     gabapentin 300 MG Oral Cap Take 1 capsule (300 mg total) by mouth 3 (three) times daily. 90 capsule 0     Omeprazole 40 MG Oral Capsule Delayed Release Take 1 capsule (40 mg total) by mouth every morning. 90 capsule 0     LEVOTHYROXINE 112 MCG Oral Tab TAKE 1 TABLET BY MOUTH DAILY BEFORE MEAL 90 tablet 0     ATORVASTATIN 40 MG Oral Tab TAKE 1 TABLET BY MOUTH DAILY IN EVENING 90 tablet 0     VALSARTAN 80 MG Oral Tab TAKE 1 TABLET BY MOUTH DAILY IN THE MORNING **FOR BLOOD PRESSURE** 90 tablet 0     ofloxacin 0.3 % Otic Solution Take 5 drops twice a day 1 each 1     doxycycline 100 MG Oral Cap Take 1 capsule (100 mg total) by mouth 2 (two) times daily. 14 capsule 0     ketoconazole 2 % External Cream Apply 1 Application topically daily. 15 g 0     Ascorbic Acid (VITAMIN C OR) Take by mouth.       montelukast (SINGULAIR) 10 MG Oral Tab Take 1 tablet (10 mg total) by mouth daily. 90 tablet 3     Glucosamine-Chondroit-Vit C-Mn (GLUCOSAMINE 1500 COMPLEX) Oral Cap Take 1,500 mg by mouth daily.       Cholecalciferol 125 MCG (5000 UT) Oral Tab Take 1 tablet (5,000 Units total) by mouth daily.       vitamin E 100 UNITS Oral Cap Take 1 capsule (100 Units total) by mouth daily.       acetaminophen 160 MG Oral Chew Tab Chew 1 tablet (160 mg total) by mouth daily as needed for Pain.        Albuterol Sulfate  (90 Base) MCG/ACT Inhalation Aero Soln Inhale 1 puff into the lungs every 6 (six) hours as needed for Wheezing.        No current facility-administered medications for this encounter.       Allergies:   Allergies   Allergen Reactions    Iodine (Topical) ANAPHYLAXIS     Shortness of breath.     Penicillin V ANAPHYLAXIS    Penicillins RASH     REPORTS ASTHMA ATTACK WITH PCN    Radiology Contrast Iodinated Dyes SHORTNESS OF BREATH    Sulfa Antibiotics RASH     Other reaction(s): Hives/Urticaria    Meloxicam NAUSEA ONLY     Had GI upset       Past Medical History:    Asthma (HCC)    Diabetes mellitus (HCC)    GERD (gastroesophageal reflux disease)    Hypothyroid    MRSA (methicillin resistant staph aureus) culture positive    Infection to left hand/arm    Osteoarthritis    Other and unspecified hyperlipidemia    Type II or unspecified type diabetes mellitus without mention of complication, not stated as uncontrolled    Unspecified essential hypertension     Past Surgical History:   Procedure Laterality Date    Appendectomy      Cholecystectomy      Hysterectomy  2005    and bl oophorectomy    Lumpectomy left      Other      bladder lift    Other      varicose vein surgery    Other  January 2014    bladder mesh repair    Other surgical history  7/10/2014    Procedure: LUMBAR FACET INJECTION;  Surgeon: Gayle Corral MD;  Location:  MAIN OR    Other surgical history  9/2/2014    Procedure: RADIOFREQUENCY ABLATION OF THORACIC OR LUMBAR MEDIAL BRANCH;  Surgeon: Gayle Corral MD;  Location:  MAIN OR    Other surgical history  9/18/2014    Procedure: RADIOFREQUENCY ABLATION OF THORACIC OR LUMBAR MEDIAL BRANCH;  Surgeon: Gayle Corral MD;  Location:  MAIN OR    Other surgical history Bilateral 3/9/2015    Procedure: SACROILIAC JOINT INJECTION BILATERAL;  Surgeon: Gayle Corral MD;  Location:  MAIN OR    Other surgical history Right 9/3/2015    Procedure: RADIOFREQUENCY  ABLATION OF THORACIC OR LUMBAR MEDIAL BRANCH;  Surgeon: Gayle Corral MD;  Location: [facility]    Other surgical history Left 9/15/2015    Procedure: RADIOFREQUENCY ABLATION OF THORACIC OR LUMBAR MEDIAL BRANCH;  Surgeon: Gayle Corral MD;  Location: [facility]    Other surgical history Bilateral 6/20/2016    Procedure: TRANSFORAMINAL LUMBAR EPIDURAL STEROID INJECTION SINGLE LEVEL;  Surgeon: Gayle Corral MD;  Location: [facility] OR    Stab phlebectomy, varicose veins, 1 extremity; <20 incisions Bilateral 2000    bl vein stripping     History reviewed. No pertinent family history.  Social History     Tobacco Use    Smoking status: Never    Smokeless tobacco: Never   Substance Use Topics    Alcohol use: No     Alcohol/week: 0.0 standard drinks of alcohol       SYSTEM Check if Review is Normal Check if Physical Exam is Normal If not normal, please explain:   HEENT [x ] [x ]    NECK & BACK [x ] [x ]    HEART [x ] [x ]    LUNGS [x ] [x ]    ABDOMEN [x ] [x ]    UROGENITAL [x ] [x ]    EXTREMITIES [x ] [x ]    OTHER        [ x ] I have discussed the risks and benefits and alternatives with the patient/family.  They understand and agree to proceed with plan of care.  [ x ] I have reviewed the History and Physical done within the last 30 days.  Any changes noted above.    Anshu Siddiqui MD

## 2024-05-28 NOTE — OPERATIVE REPORT
[facility]  Operative Report  2024     Dana Iraheta Patient Status:  Hospital Outpatient Surgery    1935 MRN [id number]   Location [facility] Attending Anshu Siddiqui MD   Hosp Day # 0 PCP Facundo Choudhury MD     Indication: Dana is a 89 year old female lumbar radiculopathy    Preoperative Diagnosis:  Lumbar radiculopathy [M54.16]  DDD (degenerative disc disease), lumbar [M51.36]  Lumbosacral spondylolysis [M43.07]    Postoperative Diagnosis: Same as above.    Procedure performed: LUMBAR INTERLAMINAR EPIDURAL INJECTION with local    Anesthesia: Local      EBL: Less than 1 ml.    Procedure Description:  After reviewing the patient's history and performing a focused physical examination, the diagnosis and positive previous diagnostic tests were confirmed and contraindications such as infection and coagulopathy were ruled out.  Following review of allergies and potential side effects and complications, including but not necessarily limited to infection, allergic reaction, local tissue breakdown, nerve injury, post-dural puncture headache and paresis, the patient consented for the procedure.    The patient was brought to the procedure room in prone position.    After sterile prep of the lumbar spine, the L4-L5 interspace was identified with the help of fluoroscopy. Local anesthetic was given by a 25 gauge 1.5 inch needle with 1% lidocaine in that space level.  Thereafter, a 20 gauge Tuohy needle was introduced and advanced under fluoroscopy.  The epidural space was accessed by using loss of resistance to air technique.  The needle position was confirmed with AP and lateral view under fluoroscopy.    Thereafter, dexamethasone 10 mg with normal saline of 5 mL in total volume of 6 mL was injected through the Tuohy needle.  The needle was flushed with 1 mL lidocaine.  The needle was withdrawn with the stylet intact in situ.  The needle's tip was intact.  The patient tolerated  the procedure very well without significant immediate complication.  The patient's back was cleaned and sterile dressing was applied.  The patient was discharged with an instruction to a responsible adult after discharge criteria were met.  The patient was advised to contact us should any problems happen.  The patient was also informed to go to the emergency room immediately if experiencing severe numbness or weakness in the extremities or experiencing bowel or bladder incontinence.            Complications: None.    Follow up: The patient was followed in the pain clinic as needed basis.          Anshu Siddiqui MD

## 2024-05-28 NOTE — DISCHARGE INSTRUCTIONS
Home Care Instructions Following Your Pain Procedure     Dana,  It has been a pleasure to have you as our patient. To help you at home, you must follow these general discharge instructions. We will review these with you before you are discharged. It is our hope that you have a complete and uneventful recovery from our procedure.     General Instructions:  What to Expect:  Bandages from your procedure today can be removed when you get home.  Please avoid soaking and/or swimming for 24 hours.  Showering is okay  It is normal to have increased pain symptoms and/or pain at injection site for up to 3-5 days after procedure, you can use heat or ice (20 minutes on 20 minutes off) for comfort.  You may experience some temporary side effects which may include restlessness or insomnia, flushing of the face, or heart palpitations.  Please contact the provider if these symptoms do not resolve within 3-4 days.  Lightheadedness or nausea may occur and should resolve within 24 to 48 hours.  If you develop a headache after treatment, rest, drink fluids (with caffeine, if possible) and take mild over-the-counter pain medication.  If the headache does not improve with the above treatment, contact the physician.  Home Medications:  Resume all previously prescribed medication.  Please avoid taking NSAIDs (Non-Steriodal Anti-Inflammatory Drugs) such as:  Ibuprofen ( Advil, Motrin) Aleve (Naproxen), Diclofenac, Meloxicam for 6 hours after procedure.   If you are on Coumadin (Warfarin) or any other anti-coagulant (or \"blood thinning\") medication such as Plavix (Clopidogrel), Xarelto (Rivaroxaban), Eliquis (Apixaban), Effient (Prasugrel) etc., restart on the following day from the procedure unless otherwise directed by your provider.  If you are a diabetic, please increase the frequency of your glucose monitoring after the procedure as steroids may cause a temporary (2-3 day) increase in your blood sugar.  Contact your primary care  physician if your blood sugar remains elevated as you may require some medication adjustment.  Diet:  Resume your regular diet as tolerated.  Activity:  We recommend that you relax and rest during the rest of your procedure day.  If you feel weakness in your arms or legs do not drive.  Follow-up Appointment  Please schedule a follow-up visit within 3 to 4 weeks after your last procedure date.  Question or Concerns:  Feel free to call our office with any questions or concerns at [phone] (option #2)    Dana  Thank you for coming to [facility] for your procedure.  The nurses try very hard to make sure you receive the best care possible.  Your trust in them as well as us is greatly appreciated.    Thanks so much,   Dr. Anshu Siddiqui

## 2024-05-29 ENCOUNTER — TELEPHONE (OUTPATIENT)
Dept: PAIN CLINIC | Facility: CLINIC | Age: 89
End: 2024-05-29

## 2024-05-29 NOTE — TELEPHONE ENCOUNTER
Follow-up call post pain procedure. Left message informing patient to contact the pain clinic at [phone] option #3 regarding any questions or concerns about recent pain procedure.       Procedure: LUMBAR INTERLAMINAR EPIDURAL INJECTION with local   Date: 05/28/24  Follow up Visit Scheduled:

## 2024-06-03 DIAGNOSIS — M51.36 DDD (DEGENERATIVE DISC DISEASE), LUMBAR: ICD-10-CM

## 2024-06-03 DIAGNOSIS — M54.16 LUMBAR RADICULOPATHY: ICD-10-CM

## 2024-06-03 DIAGNOSIS — M47.816 LUMBAR SPONDYLOSIS: ICD-10-CM

## 2024-06-03 DIAGNOSIS — Z79.891 LONG TERM (CURRENT) USE OF OPIATE ANALGESIC: ICD-10-CM

## 2024-06-03 DIAGNOSIS — Z79.891 CHRONIC PRESCRIPTION OPIATE USE: ICD-10-CM

## 2024-06-03 DIAGNOSIS — M43.07 LUMBOSACRAL SPONDYLOLYSIS: ICD-10-CM

## 2024-06-03 DIAGNOSIS — M47.819 ARTHROPATHY OF FACET JOINT: ICD-10-CM

## 2024-06-03 DIAGNOSIS — F11.90 CHRONIC NARCOTIC USE: ICD-10-CM

## 2024-06-03 RX ORDER — HYDROCODONE BITARTRATE AND ACETAMINOPHEN 5; 325 MG/1; MG/1
1 TABLET ORAL EVERY 8 HOURS PRN
Qty: 90 TABLET | Refills: 0 | Status: SHIPPED | OUTPATIENT
Start: 2024-06-03 | End: 2024-07-03

## 2024-06-03 RX ORDER — GABAPENTIN 300 MG/1
300 CAPSULE ORAL 3 TIMES DAILY
Qty: 90 CAPSULE | Refills: 0 | Status: SHIPPED | OUTPATIENT
Start: 2024-06-03

## 2024-06-03 NOTE — TELEPHONE ENCOUNTER
Medication: gabapentin 300 MG     Date of last refill: 05/04/24        Medication: HYDROcodone-acetaminophen 5-325 MG     Date of last refill: 05/07/24   [place] 05[place]07[place]24    Last office visit: 05/06/24  Due back to clinic per last office note:  3 months  Date next office visit scheduled:  none    Last URINE Screen: 02/10/24  Screen Results:    Loida Bustamante, APRN  2/18/2024 11:27 AM CST Back to Top      As expected based on prescribed meds         Narcotic Contract EXPIRATION date: 11/03/24    Last OV note recommendation:   Plan:   > Recommend lumbar epidural steroid injection for back and leg symptoms  > Refill gabapentin 300 mg 3 times daily and hydrocodone 5/325 3 times daily as needed for pain: Continue as prescribed  > Follow-up in 3 months for medication follow-up and management

## 2024-06-03 NOTE — TELEPHONE ENCOUNTER
Patient calling to request refill of   HYDROcodone-acetaminophen 5-325 MG Oral Tab     gabapentin 300 MG Oral Cap     Pharmacy [facility] #[id number] [place] [address] [address] [facility] [address], [phone], [phone] [[id number]]   Patient informed of 48 hour refill policy excluding weekends and holidays.   Informed patient prescription is sent directly to pharmacy.    Further explained patient will not receive a call back once prescription is ready.

## 2024-07-05 DIAGNOSIS — M47.816 LUMBAR SPONDYLOSIS: ICD-10-CM

## 2024-07-05 DIAGNOSIS — F11.90 CHRONIC NARCOTIC USE: ICD-10-CM

## 2024-07-05 DIAGNOSIS — M47.819 ARTHROPATHY OF FACET JOINT: ICD-10-CM

## 2024-07-05 DIAGNOSIS — M51.36 DDD (DEGENERATIVE DISC DISEASE), LUMBAR: ICD-10-CM

## 2024-07-05 DIAGNOSIS — M54.16 LUMBAR RADICULOPATHY: ICD-10-CM

## 2024-07-05 DIAGNOSIS — Z79.891 LONG TERM (CURRENT) USE OF OPIATE ANALGESIC: ICD-10-CM

## 2024-07-05 DIAGNOSIS — Z79.891 CHRONIC PRESCRIPTION OPIATE USE: ICD-10-CM

## 2024-07-05 DIAGNOSIS — M43.07 LUMBOSACRAL SPONDYLOLYSIS: ICD-10-CM

## 2024-07-05 NOTE — TELEPHONE ENCOUNTER
Patient calling to request refill of HYDROcodone-acetaminophen 5-325 MG Oral Tab AND gabapentin 300 MG Oral Cap   Pharmacy [facility] #[id number] [place] [address] [address] [facility] [address], [phone], [phone]     Patient informed of 48 hour refill policy excluding weekends and holidays.   Informed patient prescription is sent directly to pharmacy.    Further explained patient will not receive a call back once prescription is ready.

## 2024-07-08 RX ORDER — GABAPENTIN 300 MG/1
300 CAPSULE ORAL 3 TIMES DAILY
Qty: 90 CAPSULE | Refills: 0 | Status: SHIPPED | OUTPATIENT
Start: 2024-07-08

## 2024-07-08 RX ORDER — HYDROCODONE BITARTRATE AND ACETAMINOPHEN 5; 325 MG/1; MG/1
1 TABLET ORAL EVERY 8 HOURS PRN
Qty: 90 TABLET | Refills: 0 | Status: SHIPPED | OUTPATIENT
Start: 2024-07-08 | End: 2024-08-07

## 2024-07-08 NOTE — TELEPHONE ENCOUNTER
Medication: gabapentin 300 MG     Date of last refill: 06/03/24        Medication: HYDROcodone-acetaminophen 5-325 MG     Date of last refill: 06/03/24  [place] 06[place]03/24    Last office visit: 05/06/24  Due back to clinic per last office note:  3 months  Date next office visit scheduled:  none    Last URINE Screen: 02/10/24  Screen Results:  as expected      Narcotic Contract EXPIRATION date: 11/03/24    Last OV note recommendation:   Plan:   > Recommend lumbar epidural steroid injection for back and leg symptoms  > Refill gabapentin 300 mg 3 times daily and hydrocodone 5/325 3 times daily as needed for pain: Continue as prescribed  > Follow-up in 3 months for medication follow-up and management

## 2024-07-24 DIAGNOSIS — E03.9 ACQUIRED HYPOTHYROIDISM: ICD-10-CM

## 2024-07-24 RX ORDER — LEVOTHYROXINE SODIUM 112 UG/1
112 TABLET ORAL
Qty: 90 TABLET | Refills: 0 | Status: SHIPPED | OUTPATIENT
Start: 2024-07-24

## 2024-07-24 NOTE — TELEPHONE ENCOUNTER
Last time medication was refilled 04/11/2024  Last office visit  04/27/2024  Next office visit due/scheduled   Future Appointments   Date Time Provider Department Center   10/26/2024  9:00 AM Paige Cottrell PA-C [facility] [address] [address]     Medication passed protocol, refill sent.

## 2024-07-24 NOTE — TELEPHONE ENCOUNTER
Medication requested: LEVOTHYROXINE 112 MCG Oral Tab [[id number]] (Order [id number])     Dose: SEE ABOVE    Is patient requesting 30 or 90 day supply:  90    Pharmacy name/location:    [id number] - [place], [place] - [address] [facility] [address], [phone], [phone]   [address] [place] [address]   Phone: [phone] Fax: [phone]       LOV:  4/27/24    Is the patient due for appointment: NO (if so, please schedule)    Additional notes:  NA

## 2024-08-05 DIAGNOSIS — M51.36 DDD (DEGENERATIVE DISC DISEASE), LUMBAR: ICD-10-CM

## 2024-08-05 DIAGNOSIS — F11.90 CHRONIC NARCOTIC USE: ICD-10-CM

## 2024-08-05 DIAGNOSIS — Z79.891 LONG TERM (CURRENT) USE OF OPIATE ANALGESIC: ICD-10-CM

## 2024-08-05 DIAGNOSIS — Z79.891 CHRONIC PRESCRIPTION OPIATE USE: ICD-10-CM

## 2024-08-05 DIAGNOSIS — M47.816 LUMBAR SPONDYLOSIS: ICD-10-CM

## 2024-08-05 DIAGNOSIS — M43.07 LUMBOSACRAL SPONDYLOLYSIS: ICD-10-CM

## 2024-08-05 DIAGNOSIS — M54.16 LUMBAR RADICULOPATHY: ICD-10-CM

## 2024-08-05 DIAGNOSIS — M47.819 ARTHROPATHY OF FACET JOINT: ICD-10-CM

## 2024-08-05 RX ORDER — HYDROCODONE BITARTRATE AND ACETAMINOPHEN 5; 325 MG/1; MG/1
1 TABLET ORAL EVERY 8 HOURS PRN
Qty: 90 TABLET | Refills: 0 | Status: SHIPPED | OUTPATIENT
Start: 2024-08-05 | End: 2024-09-04

## 2024-08-05 RX ORDER — GABAPENTIN 300 MG/1
300 CAPSULE ORAL 3 TIMES DAILY
Qty: 90 CAPSULE | Refills: 0 | Status: SHIPPED | OUTPATIENT
Start: 2024-08-05

## 2024-08-05 NOTE — TELEPHONE ENCOUNTER
Medication:   Hydrocodone-acetaminophen 5-325mg oral tab   Gabapentin 300mg oral cap    Date of last refill: 7/8/24 (for both medications)  [place] 7[place]8/24    Last office visit: 5/06/24  Due back to clinic per last office note:  8/06/24  Date next office visit scheduled:  None    Last URINE Screen: 2/06/24  Screen Results:    Loida Bustamante, APRN  2/18/2024 11:27 AM CST Back to Top      As expected based on prescribed meds         Narcotic Contract EXPIRATION date: 11/03/24    Last OV note recommendation:   Impression:   Patient is an 88-year-old female with chronic back pain bilateral radicular symptoms.  Patient uses hydrocodone 5/325 3 times daily as needed along with gabapentin 300 mg 3 times daily.  Patient has longstanding history of chronic back pain and feels that the medications help manage her symptoms.     Patient is reporting numbness in bilateral feet left greater than right.  Patient has not had a lumbar epidural steroid injection since 2020.  She would be open to repeating her epidural steroid injection reporting that she has 50% back and 50% leg symptoms however she needs to check with her daughters.     Plan:   > Recommend lumbar epidural steroid injection for back and leg symptoms  > Refill gabapentin 300 mg 3 times daily and hydrocodone 5/325 3 times daily as needed for pain: Continue as prescribed  > Follow-up in 3 months for medication follow-up and management

## 2024-08-05 NOTE — TELEPHONE ENCOUNTER
Patient calling to request refill of HYDROcodone-acetaminophen 5-325 MG Oral Tab and gabapentin 300 MG Oral Cap   Pharmacy   [facility] #[id number] [place] [address] [address] AT [facility] [address], [phone], [phone]       Patient informed of 48 hour refill policy excluding weekends and holidays.   Informed patient prescription is sent directly to pharmacy.    Further explained patient will not receive a call back once prescription is ready.

## 2024-09-04 DIAGNOSIS — M47.816 LUMBAR SPONDYLOSIS: ICD-10-CM

## 2024-09-04 DIAGNOSIS — M54.16 LUMBAR RADICULOPATHY: ICD-10-CM

## 2024-09-04 DIAGNOSIS — Z79.891 LONG TERM (CURRENT) USE OF OPIATE ANALGESIC: ICD-10-CM

## 2024-09-04 DIAGNOSIS — F11.90 CHRONIC NARCOTIC USE: ICD-10-CM

## 2024-09-04 DIAGNOSIS — M43.07 LUMBOSACRAL SPONDYLOLYSIS: ICD-10-CM

## 2024-09-04 DIAGNOSIS — Z79.891 CHRONIC PRESCRIPTION OPIATE USE: ICD-10-CM

## 2024-09-04 DIAGNOSIS — M47.819 ARTHROPATHY OF FACET JOINT: ICD-10-CM

## 2024-09-04 DIAGNOSIS — M51.36 DDD (DEGENERATIVE DISC DISEASE), LUMBAR: ICD-10-CM

## 2024-09-04 NOTE — TELEPHONE ENCOUNTER
Patient calling to request refill of gabapentin 300 MG Oral Cap and HYDROcodone-acetaminophen 5-325 MG Oral Tab   Pharmacy [facility] #[id number] [place] [address] [address] [facility] [address], [phone], [phone]     Patient informed of 48 hour refill policy excluding weekends and holidays.   Informed patient prescription is sent directly to pharmacy.    Further explained patient will not receive a call back once prescription is ready.

## 2024-09-04 NOTE — TELEPHONE ENCOUNTER
She is a month over due for an appointment.  Have her make a follow up visit with Loida, and when she does, I will send these medications.

## 2024-09-04 NOTE — TELEPHONE ENCOUNTER
Medication:   gabapentin 300 MG Oral Cap   Date of last refill: 8/5/24    Medication:   HYDROcodone-acetaminophen 5-325 MG Oral Tab   Date of last refill: 8/5/24  [place] 8[place]24    Last office visit: 5/6/24  Due back to clinic per last office note:  > Follow-up in 3 months for medication follow-up and management  Date next office visit scheduled:  NA    Last URINE Screen: 2/9/24  Screen Results:     Loida Bustamante, APRN  2/18/2024 11:27 AM CST Back to Top      As expected based on prescribed meds         Narcotic Contract EXPIRATION date: 11/3/24    Last OV note recommendation:   Medical Decision Making:   Diagnosis:         Encounter Diagnoses   Name Primary?    Lumbar radiculopathy Yes    Long term (current) use of opiate analgesic      DDD (degenerative disc disease), lumbar           Impression:   Patient is an 88-year-old female with chronic back pain bilateral radicular symptoms.  Patient uses hydrocodone 5/325 3 times daily as needed along with gabapentin 300 mg 3 times daily.  Patient has longstanding history of chronic back pain and feels that the medications help manage her symptoms.     Patient is reporting numbness in bilateral feet left greater than right.  Patient has not had a lumbar epidural steroid injection since 2020.  She would be open to repeating her epidural steroid injection reporting that she has 50% back and 50% leg symptoms however she needs to check with her daughters.     Plan:   > Recommend lumbar epidural steroid injection for back and leg symptoms  > Refill gabapentin 300 mg 3 times daily and hydrocodone 5/325 3 times daily as needed for pain: Continue as prescribed  > Follow-up in 3 months for medication follow-up and management      Orders Placed This Encounter   Procedures    [facility] PAIN NAVIGATOR         Meds & Refills for this Visit:  Requested Prescriptions        No prescriptions requested or ordered in this encounter         Imaging &  Consults:  None     The patient indicates understanding of these issues and agrees to the plan.        ID#[id number]

## 2024-09-05 RX ORDER — HYDROCODONE BITARTRATE AND ACETAMINOPHEN 5; 325 MG/1; MG/1
1 TABLET ORAL EVERY 8 HOURS PRN
Qty: 90 TABLET | Refills: 0 | Status: SHIPPED | OUTPATIENT
Start: 2024-09-05 | End: 2024-10-05

## 2024-09-05 RX ORDER — GABAPENTIN 300 MG/1
300 CAPSULE ORAL 3 TIMES DAILY
Qty: 90 CAPSULE | Refills: 0 | Status: SHIPPED | OUTPATIENT
Start: 2024-09-05

## 2024-09-05 NOTE — TELEPHONE ENCOUNTER
Contacted pt at this time and advised that she's due for medication f/u with Loida. Pt vu and agreed to schedule. Pt is scheduled on 9/24.No further concerns at this time.

## 2024-09-10 DIAGNOSIS — E03.9 ACQUIRED HYPOTHYROIDISM: ICD-10-CM

## 2024-09-10 DIAGNOSIS — E11.29 TYPE 2 DIABETES MELLITUS WITH MICROALBUMINURIA, WITHOUT LONG-TERM CURRENT USE OF INSULIN (HCC): ICD-10-CM

## 2024-09-10 DIAGNOSIS — J45.30 MILD PERSISTENT ASTHMA WITHOUT COMPLICATION (HCC): ICD-10-CM

## 2024-09-10 DIAGNOSIS — R80.9 TYPE 2 DIABETES MELLITUS WITH MICROALBUMINURIA, WITHOUT LONG-TERM CURRENT USE OF INSULIN (HCC): ICD-10-CM

## 2024-09-10 DIAGNOSIS — I10 ESSENTIAL HYPERTENSION: ICD-10-CM

## 2024-09-10 DIAGNOSIS — E78.2 MIXED HYPERLIPIDEMIA: ICD-10-CM

## 2024-09-10 RX ORDER — LEVOTHYROXINE SODIUM 112 UG/1
112 TABLET ORAL
Qty: 90 TABLET | Refills: 0 | OUTPATIENT
Start: 2024-09-10

## 2024-09-10 RX ORDER — ATORVASTATIN CALCIUM 40 MG/1
40 TABLET, FILM COATED ORAL EVERY EVENING
Qty: 90 TABLET | Refills: 0 | Status: SHIPPED | OUTPATIENT
Start: 2024-09-10

## 2024-09-10 RX ORDER — VALSARTAN 80 MG/1
80 TABLET ORAL DAILY
Qty: 90 TABLET | Refills: 0 | Status: SHIPPED | OUTPATIENT
Start: 2024-09-10

## 2024-09-10 RX ORDER — MONTELUKAST SODIUM 10 MG/1
10 TABLET ORAL DAILY
Qty: 90 TABLET | Refills: 3 | OUTPATIENT
Start: 2024-09-10 | End: 2025-09-05

## 2024-09-10 NOTE — TELEPHONE ENCOUNTER
ATORVASTATIN 40 MG Oral Tab   Last time medication was refilled 04/11/2024  Last office visit  04/27/2024  Next office visit due/scheduled   Future Appointments   Date Time Provider Department Center   9/24/2024  8:00 AM Loida Bustamante APRN [facility] [place]   10/26/2024  9:00 AM Paige Cottrell PA-C [facility] [address] [address]     Medication passed protocol, refill sent.     levothyroxine 112 MCG Oral Tab   Last time medication was refilled 07/24/2024    Refill request too early, My [employer] message sent to patient.     montelukast (SINGULAIR) 10 MG Oral Tab   Last time medication was refilled 1/20/2023  #90 +3 refills      VALSARTAN 80 MG Oral Tab   Last time medication was refilled 04/11/2024  Last office visit  04/27/2024  Next office visit due/scheduled   Future Appointments   Date Time Provider Department Center   9/24/2024  8:00 AM Loida Bustamante APRN [facility] [place]   10/26/2024  9:00 AM Paige Cottrell PA-C [facility] [address] [address]     Medication protocol failed, routed to Doctor Choudhury for review.

## 2024-09-10 NOTE — TELEPHONE ENCOUNTER
[facility] #[id number] - [place], [place]   levothyroxine 112 MCG Oral Tab  montelukast (SINGULAIR) 10 MG Oral Tab  .Omeprazole 40 MG Oral Capsul  ATORVASTATIN 40 MG Oral Tab  VALSARTAN 80 MG Oral Tab

## 2024-09-24 ENCOUNTER — TELEPHONE (OUTPATIENT)
Dept: PAIN CLINIC | Facility: CLINIC | Age: 89
End: 2024-09-24

## 2024-09-24 ENCOUNTER — LAB ENCOUNTER (OUTPATIENT)
Dept: LAB | Age: 89
End: 2024-09-24
Attending: NURSE PRACTITIONER
Payer: MEDICARE

## 2024-09-24 ENCOUNTER — MED REC SCAN ONLY (OUTPATIENT)
Dept: PAIN CLINIC | Facility: CLINIC | Age: 89
End: 2024-09-24

## 2024-09-24 DIAGNOSIS — F11.90 CHRONIC NARCOTIC USE: ICD-10-CM

## 2024-09-24 DIAGNOSIS — M54.16 LUMBAR RADICULOPATHY: ICD-10-CM

## 2024-09-24 DIAGNOSIS — Z79.891 LONG TERM (CURRENT) USE OF OPIATE ANALGESIC: ICD-10-CM

## 2024-09-24 PROCEDURE — 80307 DRUG TEST PRSMV CHEM ANLYZR: CPT

## 2024-09-24 NOTE — TELEPHONE ENCOUNTER
Prior authorization request completed for: bilateral L4/5,L5/S1 MBB  Authorization # [id number]   Authorization dates: 9/24/24-11/3/24  CPT codes approved: 25670,40041  Number of visits/dates of service approved: 1  Physician: jen  Location: [facility]     Patient can be scheduled. Routed to Navigator.

## 2024-09-25 NOTE — TELEPHONE ENCOUNTER
Contacted patient's daughter Maggie (Ok'd per HIPAA) informed that insurance authorization received, offered to schedule injection.     Per Maggie she will check with patient first and call back.

## 2024-09-30 ENCOUNTER — OFFICE VISIT (OUTPATIENT)
Dept: INTERNAL MEDICINE CLINIC | Facility: CLINIC | Age: 89
End: 2024-09-30
Payer: MEDICARE

## 2024-09-30 VITALS
HEART RATE: 87 BPM | HEIGHT: 59 IN | SYSTOLIC BLOOD PRESSURE: 132 MMHG | TEMPERATURE: 97 F | WEIGHT: 146.19 LBS | DIASTOLIC BLOOD PRESSURE: 76 MMHG | RESPIRATION RATE: 14 BRPM | OXYGEN SATURATION: 97 % | BODY MASS INDEX: 29.47 KG/M2

## 2024-09-30 DIAGNOSIS — E11.29 TYPE 2 DIABETES MELLITUS WITH MICROALBUMINURIA, WITHOUT LONG-TERM CURRENT USE OF INSULIN (HCC): ICD-10-CM

## 2024-09-30 DIAGNOSIS — H62.40 OTITIS EXTERNA IN MYCOSES: Primary | ICD-10-CM

## 2024-09-30 DIAGNOSIS — B36.9 OTITIS EXTERNA IN MYCOSES: Primary | ICD-10-CM

## 2024-09-30 DIAGNOSIS — R80.9 TYPE 2 DIABETES MELLITUS WITH MICROALBUMINURIA, WITHOUT LONG-TERM CURRENT USE OF INSULIN (HCC): ICD-10-CM

## 2024-09-30 DIAGNOSIS — F22 EKBOM'S DELUSIONAL PARASITOSIS (HCC): ICD-10-CM

## 2024-09-30 DIAGNOSIS — R41.3 MEMORY LOSS: ICD-10-CM

## 2024-09-30 PROCEDURE — 99214 OFFICE O/P EST MOD 30 MIN: CPT | Performed by: PHYSICIAN ASSISTANT

## 2024-09-30 PROCEDURE — 1159F MED LIST DOCD IN RCRD: CPT | Performed by: PHYSICIAN ASSISTANT

## 2024-09-30 PROCEDURE — 3078F DIAST BP <80 MM HG: CPT | Performed by: PHYSICIAN ASSISTANT

## 2024-09-30 PROCEDURE — 1160F RVW MEDS BY RX/DR IN RCRD: CPT | Performed by: PHYSICIAN ASSISTANT

## 2024-09-30 PROCEDURE — 3075F SYST BP GE 130 - 139MM HG: CPT | Performed by: PHYSICIAN ASSISTANT

## 2024-09-30 PROCEDURE — 3008F BODY MASS INDEX DOCD: CPT | Performed by: PHYSICIAN ASSISTANT

## 2024-09-30 PROCEDURE — 1170F FXNL STATUS ASSESSED: CPT | Performed by: PHYSICIAN ASSISTANT

## 2024-09-30 PROCEDURE — G2211 COMPLEX E/M VISIT ADD ON: HCPCS | Performed by: PHYSICIAN ASSISTANT

## 2024-09-30 RX ORDER — CLOTRIMAZOLE 1 G/ML
SOLUTION TOPICAL
Qty: 10 ML | Refills: 0 | Status: SHIPPED | OUTPATIENT
Start: 2024-09-30 | End: 2024-10-30

## 2024-10-01 NOTE — PROGRESS NOTES
Dana Iraheta is a 89 year old female.  HPI:   Pt c/o right ear itching, pain, discharge  Seen by ENT in April for otitis externa, clotrimazole prescribed for L ear which helped.   She is still struggling with delusional parasitosis, believes very strongly that there are bugs under her skin.   Daughter also reports some memory loss, forgetfulness.   Current Outpatient Medications   Medication Sig Dispense Refill    clotrimazole 1 % External Solution Apply to both ears 4 drops three times daily for 7 days. 10 mL 0    atorvastatin 40 MG Oral Tab Take 1 tablet (40 mg total) by mouth every evening. 90 tablet 0    valsartan 80 MG Oral Tab Take 1 tablet (80 mg total) by mouth daily. 90 tablet 0    gabapentin 300 MG Oral Cap Take 1 capsule (300 mg total) by mouth 3 (three) times daily. 90 capsule 0    HYDROcodone-acetaminophen 5-325 MG Oral Tab Take 1 tablet by mouth every 8 (eight) hours as needed for Pain. 90 tablet 0    levothyroxine 112 MCG Oral Tab Take 1 tablet (112 mcg total) by mouth before breakfast. 90 tablet 0    Omeprazole 40 MG Oral Capsule Delayed Release Take 1 capsule (40 mg total) by mouth every morning. 90 capsule 0    ofloxacin 0.3 % Otic Solution Take 5 drops twice a day 1 each 1    doxycycline 100 MG Oral Cap Take 1 capsule (100 mg total) by mouth 2 (two) times daily. 14 capsule 0    ketoconazole 2 % External Cream Apply 1 Application topically daily. 15 g 0    Ascorbic Acid (VITAMIN C OR) Take by mouth.      montelukast (SINGULAIR) 10 MG Oral Tab Take 1 tablet (10 mg total) by mouth daily. 90 tablet 3    Glucosamine-Chondroit-Vit C-Mn (GLUCOSAMINE 1500 COMPLEX) Oral Cap Take 1,500 mg by mouth daily.      Cholecalciferol 125 MCG (5000 UT) Oral Tab Take 1 tablet (5,000 Units total) by mouth daily.      vitamin E 100 UNITS Oral Cap Take 1 capsule (100 Units total) by mouth daily.      acetaminophen 160 MG Oral Chew Tab Chew 1 tablet (160 mg total) by mouth daily as needed for Pain.      Albuterol  Sulfate  (90 Base) MCG/ACT Inhalation Aero Soln Inhale 1 puff into the lungs every 6 (six) hours as needed for Wheezing.        Past Medical History:    Asthma (HCC)    Diabetes mellitus (HCC)    GERD (gastroesophageal reflux disease)    Hypothyroid    MRSA (methicillin resistant staph aureus) culture positive    Infection to left hand/arm    Osteoarthritis    Other and unspecified hyperlipidemia    Type II or unspecified type diabetes mellitus without mention of complication, not stated as uncontrolled    Unspecified essential hypertension      Social History:  Social History     Socioeconomic History    Marital status:    Tobacco Use    Smoking status: Never    Smokeless tobacco: Never   Vaping Use    Vaping status: Never Used   Substance and Sexual Activity    Alcohol use: No     Alcohol/week: 0.0 standard drinks of alcohol    Drug use: No   Other Topics Concern    Caffeine Concern Yes     Comment: 1-2 cups daily    Exercise No     Comment: rare     Social Determinants of Health      [facility]    Social Connections    [facility]    Housing Stability        REVIEW OF SYSTEMS:   GENERAL HEALTH: feels well otherwise. Denies fever, chills, unintentional weight change  SKIN: denies any unusual skin lesions or rashes  RESPIRATORY: denies shortness of breath with exertion, denies cough or wheezing  CARDIOVASCULAR: denies chest pain or palpitations, denies leg swelling  GI: denies abdominal pain and denies heartburn. Denies nausea, vomiting, diarrhea, constipation  NEURO: denies headaches, dizziness, weakness, syncope    EXAM:   /76   Pulse 87   Temp 97 °F (36.1 °C) (Temporal)   Resp 14   Ht 4' 11\" (1.499 m)   Wt 146 lb 3.2 oz (66.3 kg)   SpO2 97%   BMI 29.53 kg/m²   GENERAL: well developed, well nourished,in no apparent distress  SKIN: no rashes,no suspicious  lesions, warm and dry  HEENT: atraumatic, normocephalic,R ear mild erythema of canal, TM is clear. Left ear yellow discharge, no erythema, TM obscured by discharge  NECK: supple,no adenopathy, no thyromegaly  LUNGS: clear to auscultation b/l no W/R/R  CARDIO: RRR without murmur  EXTREMITIES: no cyanosis, clubbing or edema  MUSCULOSKELETAL: FROM, no joint swelling or bony tenderness  NEURO: a/ox3, forgetful, loses train of thought. Perseverates on finding insects on her skin.  Bilateral barefoot skin diabetic exam is normal, visualized feet and the appearance is normal.  Bilateral monofilament/sensation of both feet is normal.  Pulsation pedal pulse exam of both lower legs/feet is normal as well.     PSYCH: mood and affect normal    ASSESSMENT AND PLAN:   1. 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  Dispense: 10 mL; Refill: 0  2. Memory loss  Establish with neurology. Check labs.   -     Neuro Referral - In Network  -     TSH W Reflex To Free T4; Future; Expected date: 09/30/2024  -     Vitamin B12; Future; Expected date: 09/30/2024  -     Folic Acid Serum (Folate); Future; Expected date: 09/30/2024  3. Type 2 diabetes mellitus with microalbuminuria, without long-term current use of insulin (HCC)  Check labs. Schedule eye exam. Diet-controlled.   -     CBC With Differential With Platelet; Future; Expected date: 09/30/2024  -     Comp Metabolic Panel (14); Future; Expected date: 09/30/2024  -     Lipid Panel; Future; Expected date: 09/30/2024  -     Hemoglobin A1C; Future; Expected date: 09/30/2024  -     Urinalysis, Routine; Future; Expected date: 09/30/2024  4. Ekbom's delusional parasitosis (HCC)  Multiple derm consults and other primary providers without evidence of infection. Refuses psych consult.  Establish with neuro   -     Neuro Referral - In Network         The patient indicates understanding of these issues and agrees to the plan.  Return in about 1 month (around 10/30/2024) for routine physical.

## 2024-10-05 ENCOUNTER — LAB ENCOUNTER (OUTPATIENT)
Dept: LAB | Facility: HOSPITAL | Age: 89
End: 2024-10-05
Attending: PHYSICIAN ASSISTANT
Payer: MEDICARE

## 2024-10-05 DIAGNOSIS — R80.9 TYPE 2 DIABETES MELLITUS WITH MICROALBUMINURIA, WITHOUT LONG-TERM CURRENT USE OF INSULIN (HCC): ICD-10-CM

## 2024-10-05 DIAGNOSIS — R41.3 MEMORY LOSS: ICD-10-CM

## 2024-10-05 DIAGNOSIS — E11.29 TYPE 2 DIABETES MELLITUS WITH MICROALBUMINURIA, WITHOUT LONG-TERM CURRENT USE OF INSULIN (HCC): ICD-10-CM

## 2024-10-05 LAB
ALBUMIN SERPL-MCNC: 4.4 G/DL (ref 3.2–4.8)
ALBUMIN/GLOB SERPL: 1.8 {RATIO} (ref 1–2)
ALP LIVER SERPL-CCNC: 76 U/L
ALT SERPL-CCNC: 13 U/L
ANION GAP SERPL CALC-SCNC: 6 MMOL/L (ref 0–18)
AST SERPL-CCNC: 15 U/L (ref ?–34)
BASOPHILS # BLD AUTO: 0.04 X10(3) UL (ref 0–0.2)
BASOPHILS NFR BLD AUTO: 0.9 %
BILIRUB SERPL-MCNC: 0.4 MG/DL (ref 0.2–1.1)
BILIRUB UR QL STRIP.AUTO: NEGATIVE
BUN BLD-MCNC: 14 MG/DL (ref 9–23)
CALCIUM BLD-MCNC: 9.4 MG/DL (ref 8.7–10.4)
CHLORIDE SERPL-SCNC: 107 MMOL/L (ref 98–112)
CHOLEST SERPL-MCNC: 100 MG/DL (ref ?–200)
CLARITY UR REFRACT.AUTO: CLEAR
CO2 SERPL-SCNC: 29 MMOL/L (ref 21–32)
COLOR UR AUTO: YELLOW
CREAT BLD-MCNC: 0.69 MG/DL
EGFRCR SERPLBLD CKD-EPI 2021: 84 ML/MIN/1.73M2 (ref 60–?)
EOSINOPHIL # BLD AUTO: 0.24 X10(3) UL (ref 0–0.7)
EOSINOPHIL NFR BLD AUTO: 5.5 %
ERYTHROCYTE [DISTWIDTH] IN BLOOD BY AUTOMATED COUNT: 12.5 %
EST. AVERAGE GLUCOSE BLD GHB EST-MCNC: 146 MG/DL (ref 68–126)
FASTING PATIENT LIPID ANSWER: YES
FASTING STATUS PATIENT QL REPORTED: YES
FOLATE SERPL-MCNC: 12.8 NG/ML (ref 5.4–?)
GLOBULIN PLAS-MCNC: 2.4 G/DL (ref 2–3.5)
GLUCOSE BLD-MCNC: 115 MG/DL (ref 70–99)
GLUCOSE UR STRIP.AUTO-MCNC: NORMAL MG/DL
HBA1C MFR BLD: 6.7 % (ref ?–5.7)
HCT VFR BLD AUTO: 37.8 %
HDLC SERPL-MCNC: 51 MG/DL (ref 40–59)
HGB BLD-MCNC: 12.6 G/DL
IMM GRANULOCYTES # BLD AUTO: 0.01 X10(3) UL (ref 0–1)
IMM GRANULOCYTES NFR BLD: 0.2 %
KETONES UR STRIP.AUTO-MCNC: NEGATIVE MG/DL
LDLC SERPL CALC-MCNC: 32 MG/DL (ref ?–100)
LEUKOCYTE ESTERASE UR QL STRIP.AUTO: 500
LYMPHOCYTES # BLD AUTO: 1.1 X10(3) UL (ref 1–4)
LYMPHOCYTES NFR BLD AUTO: 25.1 %
MCH RBC QN AUTO: 29.9 PG (ref 26–34)
MCHC RBC AUTO-ENTMCNC: 33.3 G/DL (ref 31–37)
MCV RBC AUTO: 89.6 FL
MONOCYTES # BLD AUTO: 0.41 X10(3) UL (ref 0.1–1)
MONOCYTES NFR BLD AUTO: 9.4 %
NEUTROPHILS # BLD AUTO: 2.58 X10 (3) UL (ref 1.5–7.7)
NEUTROPHILS # BLD AUTO: 2.58 X10(3) UL (ref 1.5–7.7)
NEUTROPHILS NFR BLD AUTO: 58.9 %
NITRITE UR QL STRIP.AUTO: NEGATIVE
NONHDLC SERPL-MCNC: 49 MG/DL (ref ?–130)
OSMOLALITY SERPL CALC.SUM OF ELEC: 295 MOSM/KG (ref 275–295)
PH UR STRIP.AUTO: 5 [PH] (ref 5–8)
PLATELET # BLD AUTO: 177 10(3)UL (ref 150–450)
POTASSIUM SERPL-SCNC: 4.3 MMOL/L (ref 3.5–5.1)
PROT SERPL-MCNC: 6.8 G/DL (ref 5.7–8.2)
PROT UR STRIP.AUTO-MCNC: 50 MG/DL
RBC # BLD AUTO: 4.22 X10(6)UL
SODIUM SERPL-SCNC: 142 MMOL/L (ref 136–145)
SP GR UR STRIP.AUTO: >1.03 (ref 1–1.03)
TRIGL SERPL-MCNC: 85 MG/DL (ref 30–149)
TSI SER-ACNC: 0.83 MIU/ML (ref 0.55–4.78)
UROBILINOGEN UR STRIP.AUTO-MCNC: NORMAL MG/DL
VIT B12 SERPL-MCNC: 346 PG/ML (ref 211–911)
VLDLC SERPL CALC-MCNC: 11 MG/DL (ref 0–30)
WBC # BLD AUTO: 4.4 X10(3) UL (ref 4–11)

## 2024-10-05 PROCEDURE — 84443 ASSAY THYROID STIM HORMONE: CPT

## 2024-10-05 PROCEDURE — 80061 LIPID PANEL: CPT

## 2024-10-05 PROCEDURE — 82607 VITAMIN B-12: CPT

## 2024-10-05 PROCEDURE — 82746 ASSAY OF FOLIC ACID SERUM: CPT

## 2024-10-05 PROCEDURE — 81001 URINALYSIS AUTO W/SCOPE: CPT

## 2024-10-05 PROCEDURE — 36415 COLL VENOUS BLD VENIPUNCTURE: CPT

## 2024-10-05 PROCEDURE — 80053 COMPREHEN METABOLIC PANEL: CPT

## 2024-10-05 PROCEDURE — 85025 COMPLETE CBC W/AUTO DIFF WBC: CPT

## 2024-10-05 PROCEDURE — 83036 HEMOGLOBIN GLYCOSYLATED A1C: CPT

## 2024-10-07 DIAGNOSIS — M47.819 ARTHROPATHY OF FACET JOINT: ICD-10-CM

## 2024-10-07 DIAGNOSIS — M51.369 DDD (DEGENERATIVE DISC DISEASE), LUMBAR: ICD-10-CM

## 2024-10-07 DIAGNOSIS — M47.816 LUMBAR SPONDYLOSIS: ICD-10-CM

## 2024-10-07 DIAGNOSIS — M43.07 LUMBOSACRAL SPONDYLOLYSIS: ICD-10-CM

## 2024-10-07 DIAGNOSIS — Z79.891 CHRONIC PRESCRIPTION OPIATE USE: ICD-10-CM

## 2024-10-07 DIAGNOSIS — M54.16 LUMBAR RADICULOPATHY: ICD-10-CM

## 2024-10-07 DIAGNOSIS — Z79.891 LONG TERM (CURRENT) USE OF OPIATE ANALGESIC: ICD-10-CM

## 2024-10-07 DIAGNOSIS — F11.90 CHRONIC NARCOTIC USE: ICD-10-CM

## 2024-10-07 RX ORDER — HYDROCODONE BITARTRATE AND ACETAMINOPHEN 5; 325 MG/1; MG/1
1 TABLET ORAL EVERY 8 HOURS PRN
Qty: 90 TABLET | Refills: 0 | Status: SHIPPED | OUTPATIENT
Start: 2024-10-07 | End: 2024-11-06

## 2024-10-07 RX ORDER — GABAPENTIN 300 MG/1
300 CAPSULE ORAL 3 TIMES DAILY
Qty: 90 CAPSULE | Refills: 0 | Status: SHIPPED | OUTPATIENT
Start: 2024-10-07

## 2024-10-07 NOTE — TELEPHONE ENCOUNTER
Medication: gabapentin 300 MG     Date of last refill: 09/05/24      Medication: HYDROcodone-acetaminophen 5-325 MG     Date of last refill: 09/05/24  [place] 09[place]05/24    Last office visit: 09/24/24  Due back to clinic per last office note:  3 months  Date next office visit scheduled:  12/17/24    Last URINE Screen: 09/24/24  Screen Results:  as expected      Narcotic Contract EXPIRATION date: 09/24/25    Last OV note recommendation: Plan:   > Recommend medial branch blocks at bilateral L4-5 L5-S1 level staging towards radiofrequency  > Follow-up 2 weeks after medial branch block  > Continue gabapentin 300 mg 3 times daily and hydrocodone 5/325 3 times daily as needed for pain: Call for refills  > Update opioid agreement and urine drug screen today  > Encouraged patient to follow-up with her primary care doctor regarding varicosities in lower extremities, bladder complaints and abdominal hernia complaints.  > Follow-up in 3 months for medication follow-up and management

## 2024-10-07 NOTE — TELEPHONE ENCOUNTER
Patient calling to request refill of gabapentin 300 MG Oral Cap AND HYDROcodone-acetaminophen 5-325 MG Oral Tab   Pharmacy [facility] #[id number] [place] [address] [address] [facility] [address], [phone], [phone]     Patient informed of 48 hour refill policy excluding weekends and holidays.   Informed patient prescription is sent directly to pharmacy.    Further explained patient will not receive a call back once prescription is ready.

## 2024-10-08 ENCOUNTER — TELEPHONE (OUTPATIENT)
Dept: INTERNAL MEDICINE CLINIC | Facility: CLINIC | Age: 89
End: 2024-10-08

## 2024-10-08 DIAGNOSIS — R41.3 MEMORY LOSS: Primary | ICD-10-CM

## 2024-10-08 NOTE — TELEPHONE ENCOUNTER
Neurology specialist given prior was out of network: 9/30/24Kirk Harrington MD     Requesting new Referral    [facility]    Provider's Name: TBD   Phone #     Specialty: Neurology      Reason for Visit: Memory Loss     Has patient seen this specialist before:  No  If so LOV:  n/a    Number of Visits Requested: tbd     Is Appt. Already Scheduled: no        If so, Date: n/a     Has patient seen our provider for reason:  yes    Please advise via [facility]Where

## 2024-10-29 ENCOUNTER — TELEPHONE (OUTPATIENT)
Dept: INTERNAL MEDICINE CLINIC | Facility: CLINIC | Age: 89
End: 2024-10-29

## 2024-10-29 DIAGNOSIS — E03.9 ACQUIRED HYPOTHYROIDISM: ICD-10-CM

## 2024-10-29 RX ORDER — LEVOTHYROXINE SODIUM 112 UG/1
112 TABLET ORAL
Qty: 90 TABLET | Refills: 0 | Status: SHIPPED | OUTPATIENT
Start: 2024-10-29

## 2024-10-29 NOTE — TELEPHONE ENCOUNTER
Last time medication was refilled 07/24/2024  Last office visit  09/30/2024  Next office visit due/scheduled   Future Appointments   Date Time Provider Department Center   10/30/2024  1:00 PM Paige Cottrell PA-C [facility] [address] [facility] [address]   11/18/2024  9:15 AM EMG AUDIO [facility] EM[facility]UD[facility] [id number]   11/18/2024  9:30 AM Carlota Magana MD EMGOTONAPER [id number]   12/17/2024  8:00 AM Loida Bustamante APRN ENIPain [facility] [place]       Passed protocol, Medication sent.

## 2024-10-29 NOTE — TELEPHONE ENCOUNTER
levothyroxine 112 MCG Oral Tab     90 Day     [id number] - [place], [place] - [address] [facility] [address], [phone], [phone] [[id number]]     Last OV 9/30/24    Next OV 10/30/24

## 2024-10-30 RX ORDER — LEVOTHYROXINE SODIUM 112 UG/1
112 TABLET ORAL
Qty: 90 TABLET | Refills: 0 | OUTPATIENT
Start: 2024-10-30

## 2024-11-05 DIAGNOSIS — M47.819 ARTHROPATHY OF FACET JOINT: ICD-10-CM

## 2024-11-05 DIAGNOSIS — M54.16 LUMBAR RADICULOPATHY: ICD-10-CM

## 2024-11-05 DIAGNOSIS — Z79.891 LONG TERM (CURRENT) USE OF OPIATE ANALGESIC: ICD-10-CM

## 2024-11-05 DIAGNOSIS — Z79.891 CHRONIC PRESCRIPTION OPIATE USE: ICD-10-CM

## 2024-11-05 DIAGNOSIS — M47.816 LUMBAR SPONDYLOSIS: ICD-10-CM

## 2024-11-05 DIAGNOSIS — M51.369 DDD (DEGENERATIVE DISC DISEASE), LUMBAR: ICD-10-CM

## 2024-11-05 DIAGNOSIS — M43.07 LUMBOSACRAL SPONDYLOLYSIS: ICD-10-CM

## 2024-11-05 DIAGNOSIS — F11.90 CHRONIC NARCOTIC USE: ICD-10-CM

## 2024-11-05 NOTE — TELEPHONE ENCOUNTER
Patient calling to request refill of gabapentin 300 MG Oral Cap AND HYDROcodone-acetaminophen 5-325   Pharmacy [facility] #[id number] [place] [address] [address] AT [facility] [address], [phone], [phone]     Patient informed of 48 hour refill policy excluding weekends and holidays.   Informed patient prescription is sent directly to pharmacy.    Further explained patient will not receive a call back once prescription is ready.

## 2024-11-06 RX ORDER — HYDROCODONE BITARTRATE AND ACETAMINOPHEN 5; 325 MG/1; MG/1
1 TABLET ORAL EVERY 8 HOURS PRN
Qty: 90 TABLET | Refills: 0 | Status: SHIPPED | OUTPATIENT
Start: 2024-11-06 | End: 2024-12-06

## 2024-11-06 RX ORDER — GABAPENTIN 300 MG/1
300 CAPSULE ORAL 3 TIMES DAILY
Qty: 90 CAPSULE | Refills: 0 | Status: SHIPPED | OUTPATIENT
Start: 2024-11-06

## 2024-11-06 NOTE — TELEPHONE ENCOUNTER
Medication:   gabapentin 300 MG Oral Cap   Date of last refill: 10/7/24    Medication:   HYDROcodone-acetaminophen 5-325 MG Oral Tab   Date of last refill: 10/7/24  [place]/24    Last office visit: 9/24/24  Due back to clinic per last office note:  3 months  Date next office visit scheduled:  12/17/24    Last URINE Screen: 9/24/24  Screen Results:     Loida Bustamante, SUZAN  9/30/2024  8:00 AM CDT Back to Top      Results as expected based on medication prescribed         Narcotic Contract EXPIRATION date: 9/24/25    Last OV note recommendation:   Medical Decision Making:   Diagnosis:         Encounter Diagnoses   Name Primary?    Chronic narcotic use Yes    Lumbar radiculopathy      Long term (current) use of opiate analgesic      Lumbar spondylosis              Impression:   Patient is an 88-year-old female with past medical history of MDD, type 2 diabetes with microalbumin being urea, hyperlipidemia, hypertension, delusional.  Cytosis, carotid atherosclerosis, hypothyroidism, history of stroke, at risk for falls, with chronic back pain bilateral radicular symptoms.  Patient uses hydrocodone 5/325 3 times daily as needed along with gabapentin 300 mg 3 times daily.  Patient has longstanding history of chronic back pain and feels that the medications help manage her symptoms.     Patient is reporting numbness in bilateral feet left greater than right.  She did have lumbar epidural steroid injection May 2024 and reports that that did not help her leg symptoms.  She is most concerned about the varicosities in her lower extremities and feels that this is contributing to her aching feeling in her legs.  She did request to resume tizanidine which was the muscle relaxant that was DC'd months ago due to hallucination complaints.  This request was denied.  She was encouraged to consider using magnesium supplement 200 mg magnesium glycinate.  She was also asked to follow-up with  her primary care provider regarding the varicosities and use support hose when she is up and walking.     For patient's back pain she has had treatment for her lower lumbar facets in the past.  She was open to repeating these as her low back pain was not relieved with the lumbar epidural steroid injection.  She was reminded that the LESI was conducted due to her complaints of back and lower extremity symptoms however today her primary complaint is her low back pain.  Patient is reporting that her low back pain is inhibiting her quality of life and activities of daily living and feels that this is a disability that she would like to be treated.     Current VAS Score: 5  Functional Deficits: Walking lifting bending back household chores  Duration of pain symptoms: Greater than 2 years  Specific Levels (Only 2 allowed): L4-5 L5-S1  Initial treatment or subsequent? (Per area of the body): Had facet injections in 2022  Radiculopathy & neurogenic claudication ruled out?:  Not applicable  Pre-procedure assessment completed on: Today  Post-procedure assessment completed on: Will be done 2 weeks post procedure  % of pain relief from previous procedure/s: Could not recall if facet injections helped in 2022  VAS Score during diagnostic phase: Not applicable  Duration of relief from previous procedure/s: Not applicable  Prior Conservative Treatment: Medications and physical therapy failed in past      She is not an NSAID candidate due to CKD.  Patient will update her opioid agreement today and urine drug screen.     Total visit time: 30 minutes  More than 50% spent coordinating care, providing patient education, reviewing imaging, discussing conservative vs surgical treatment options and counseling.        Plan:   > Recommend medial branch blocks at bilateral L4-5 L5-S1 level staging towards radiofrequency  > Follow-up 2 weeks after medial branch block  > Continue gabapentin 300 mg 3 times daily and hydrocodone 5/325 3 times  daily as needed for pain: Call for refills  > Update opioid agreement and urine drug screen today  > Encouraged patient to follow-up with her primary care doctor regarding varicosities in lower extremities, bladder complaints and abdominal hernia complaints.  > Follow-up in 3 months for medication follow-up and management      Orders Placed This Encounter   Procedures    Pain Management Drug Panel, Urine         Meds & Refills for this Visit:  Requested Prescriptions        No prescriptions requested or ordered in this encounter         Imaging & Consults:  None     The patient indicates understanding of these issues and agrees to the plan.        ID#[id number]

## 2024-11-18 ENCOUNTER — OFFICE VISIT (OUTPATIENT)
Facility: LOCATION | Age: 89
End: 2024-11-18
Payer: MEDICARE

## 2024-11-18 DIAGNOSIS — T16.1XXS: Primary | ICD-10-CM

## 2024-11-18 NOTE — PROGRESS NOTES
Dana Iraheta is a 89 year old female.   Chief Complaint   Patient presents with    Ear Problem    Cerumen Impaction     HPI:   89 year old female presenting for evaluation of otalgia.  She has previously been seen by Dr. Coleman for otomycosis.  She reports going to an audiologist who thought she may still have an infection so she is presenting for further evaluation.  She denies otorrhea.  Does complain of ear fullness. Uses q-tips regularly.     Current Outpatient Medications   Medication Sig Dispense Refill    HYDROcodone-acetaminophen 5-325 MG Oral Tab Take 1 tablet by mouth every 8 (eight) hours as needed for Pain. 90 tablet 0    gabapentin 300 MG Oral Cap Take 1 capsule (300 mg total) by mouth 3 (three) times daily. 90 capsule 0    LEVOTHYROXINE 112 MCG Oral Tab TAKE 1 TABLET(112 MCG) BY MOUTH BEFORE BREAKFAST 90 tablet 0    nitrofurantoin monohydrate macro 100 MG Oral Cap Take 1 capsule (100 mg total) by mouth 2 (two) times daily. 10 capsule 0    atorvastatin 40 MG Oral Tab Take 1 tablet (40 mg total) by mouth every evening. 90 tablet 0    valsartan 80 MG Oral Tab Take 1 tablet (80 mg total) by mouth daily. 90 tablet 0    Omeprazole 40 MG Oral Capsule Delayed Release Take 1 capsule (40 mg total) by mouth every morning. 90 capsule 0    ofloxacin 0.3 % Otic Solution Take 5 drops twice a day 1 each 1    doxycycline 100 MG Oral Cap Take 1 capsule (100 mg total) by mouth 2 (two) times daily. 14 capsule 0    ketoconazole 2 % External Cream Apply 1 Application topically daily. 15 g 0    Ascorbic Acid (VITAMIN C OR) Take by mouth.      Glucosamine-Chondroit-Vit C-Mn (GLUCOSAMINE 1500 COMPLEX) Oral Cap Take 1,500 mg by mouth daily.      Cholecalciferol 125 MCG (5000 UT) Oral Tab Take 1 tablet (5,000 Units total) by mouth daily.      vitamin E 100 UNITS Oral Cap Take 1 capsule (100 Units total) by mouth daily.      acetaminophen 160 MG Oral Chew Tab Chew 1 tablet (160 mg total) by mouth daily as needed for Pain.       Albuterol Sulfate  (90 Base) MCG/ACT Inhalation Aero Soln Inhale 1 puff into the lungs every 6 (six) hours as needed for Wheezing.        Past Medical History:    Asthma (HCC)    Diabetes mellitus (HCC)    GERD (gastroesophageal reflux disease)    Hypothyroid    MRSA (methicillin resistant staph aureus) culture positive    Infection to left hand/arm    Osteoarthritis    Other and unspecified hyperlipidemia    Type II or unspecified type diabetes mellitus without mention of complication, not stated as uncontrolled    Unspecified essential hypertension      Social History:  Social History     Socioeconomic History    Marital status:    Tobacco Use    Smoking status: Never    Smokeless tobacco: Never   Vaping Use    Vaping status: Never Used   Substance and Sexual Activity    Alcohol use: No     Alcohol/week: 0.0 standard drinks of alcohol    Drug use: No   Other Topics Concern    Caffeine Concern Yes     Comment: 1-2 cups daily    Exercise No     Comment: rare     Social Drivers of Health      Received from [facility], [facility]    Social Connections    Received from [facility], [facility]    Housing Stability      Past Surgical History:   Procedure Laterality Date    Appendectomy      Cholecystectomy      Hysterectomy  2005    and bl oophorectomy    Lumpectomy left      Other      bladder lift    Other      varicose vein surgery    Other  January 2014    bladder mesh repair    Other surgical history  7/10/2014    Procedure: LUMBAR FACET INJECTION;  Surgeon: Gayle Corral MD;  Location:  MAIN OR    Other surgical history  9/2/2014    Procedure: RADIOFREQUENCY ABLATION OF THORACIC OR LUMBAR MEDIAL BRANCH;  Surgeon: Gayle Corral MD;  Location:  MAIN OR    Other surgical history  9/18/2014    Procedure: RADIOFREQUENCY ABLATION OF THORACIC OR LUMBAR MEDIAL BRANCH;  Surgeon: Gayle Corral MD;  Location:  MAIN  OR    Other surgical history Bilateral 3/9/2015    Procedure: SACROILIAC JOINT INJECTION BILATERAL;  Surgeon: Gayle Corral MD;  Location:  MAIN OR    Other surgical history Right 9/3/2015    Procedure: RADIOFREQUENCY ABLATION OF THORACIC OR LUMBAR MEDIAL BRANCH;  Surgeon: Gayle Corral MD;  Location:  MAIN OR    Other surgical history Left 9/15/2015    Procedure: RADIOFREQUENCY ABLATION OF THORACIC OR LUMBAR MEDIAL BRANCH;  Surgeon: Gayle Corral MD;  Location:  MAIN OR    Other surgical history Bilateral 6/20/2016    Procedure: TRANSFORAMINAL LUMBAR EPIDURAL STEROID INJECTION SINGLE LEVEL;  Surgeon: Gayle Corral MD;  Location:  MAIN OR    Stab phlebectomy, varicose veins, 1 extremity; <20 incisions Bilateral 2000    bl vein stripping         REVIEW OF SYSTEMS:   GENERAL HEALTH: feels well otherwise  GENERAL : denies fever, chills, sweats, weight loss, weight gain  SKIN: denies any unusual skin lesions or rashes  RESPIRATORY: denies shortness of breath with exertion  NEURO: denies headaches    EXAM:   There were no vitals taken for this visit.    System Findings Details   Constitutional  Overall appearance - Normal.   Head/Face  Facial features -- Normal. Skull - Normal.   Eyes  Sclera white, pupils equal and round, EOMI   Ears  External ears normal in appearance, see otomicroscopy   Nose  External nose normal in appearance, nares patent   Oral cavity  Lips normal in appearance   Neck  Trachea midline   Neurological  Memory - Normal. Cranial nerves - Cranial nerves II through XII grossly intact.     Otomicroscopy:  A microscope was used to perform cerumen disimpaction.   Right: EAC clear, TM intact without middle ear effusion  Left: EAC with cotton swab foreign body, removed, also impacted with cerumen, cerumen removed, TM intact without middle ear effusion      ASSESSMENT AND PLAN:   89 year old female presenting for evaluation of otalgia.     -Foreign body removed, counseled to stop q-tip  use  -Follow up for audiogram    The patient indicates understanding of these issues and agrees to the plan.    Carlota Magana MD  11/18/2024  10:28 AM

## 2024-11-18 NOTE — ADDENDUM NOTE
Addended by: KEE MCBRIDE on: 11/18/2024 10:32 AM     Modules accepted: Level of Service    
PROCEDURES:  Biopsy, thyroid 20-Mar-2019 11:52:00  Ric Edwards

## 2024-12-02 NOTE — PROGRESS NOTES
Patient presents in office today with reported pain in bilateral legs and low back.  Pain is worst in left leg    Current pain level reported = 6/10    Last reported dose of norco 1 hour ago      Narcotic Contract renewal 11/4/22    Urine Drug screen 11/5/22 Statement Selected

## 2024-12-03 DIAGNOSIS — M43.07 LUMBOSACRAL SPONDYLOLYSIS: ICD-10-CM

## 2024-12-03 DIAGNOSIS — M47.816 LUMBAR SPONDYLOSIS: ICD-10-CM

## 2024-12-03 DIAGNOSIS — M54.16 LUMBAR RADICULOPATHY: ICD-10-CM

## 2024-12-03 DIAGNOSIS — Z79.891 LONG TERM (CURRENT) USE OF OPIATE ANALGESIC: ICD-10-CM

## 2024-12-03 DIAGNOSIS — M47.819 ARTHROPATHY OF FACET JOINT: ICD-10-CM

## 2024-12-03 DIAGNOSIS — F11.90 CHRONIC NARCOTIC USE: ICD-10-CM

## 2024-12-03 DIAGNOSIS — Z79.891 CHRONIC PRESCRIPTION OPIATE USE: ICD-10-CM

## 2024-12-03 DIAGNOSIS — M51.369 DDD (DEGENERATIVE DISC DISEASE), LUMBAR: ICD-10-CM

## 2024-12-03 NOTE — TELEPHONE ENCOUNTER
Patient calling to request refill of gabapentin 300 MG Oral Cap AND HYDROcodone-acetaminophen 5-325 MG Oral Tab   Pharmacy [facility] #[id number] [place] [address] [address] [facility] [address], [phone], [phone]     Patient informed of 48 hour refill policy excluding weekends and holidays.   Informed patient prescription is sent directly to pharmacy.    Further explained patient will not receive a call back once prescription is ready.

## 2024-12-04 RX ORDER — HYDROCODONE BITARTRATE AND ACETAMINOPHEN 5; 325 MG/1; MG/1
1 TABLET ORAL EVERY 8 HOURS PRN
Qty: 90 TABLET | Refills: 0 | Status: SHIPPED | OUTPATIENT
Start: 2024-12-06 | End: 2025-01-05

## 2024-12-04 RX ORDER — GABAPENTIN 300 MG/1
300 CAPSULE ORAL 3 TIMES DAILY
Qty: 90 CAPSULE | Refills: 0 | Status: SHIPPED | OUTPATIENT
Start: 2024-12-04

## 2024-12-04 NOTE — TELEPHONE ENCOUNTER
Medication:   gabapentin 300 MG Oral Cap     Date of last refill: 11/6/24    Medication:   HYDROcodone-acetaminophen 5-325 MG Oral Tab     Date of last refill: 11/6/24  [place] 11/6/24    Last office visit: 9/24/24  Due back to clinic per last office note:  3 months  Date next office visit scheduled:  12/17/24    Last URINE Screen: 9/24/24  Screen Results:    Results as expected based on medication prescribed     Narcotic Contract EXPIRATION date: 9/24/25    Last OV note recommendation: Impression:   Patient is an 88-year-old female with past medical history of MDD, type 2 diabetes with microalbumin being urea, hyperlipidemia, hypertension, delusional.  Cytosis, carotid atherosclerosis, hypothyroidism, history of stroke, at risk for falls, with chronic back pain bilateral radicular symptoms.  Patient uses hydrocodone 5/325 3 times daily as needed along with gabapentin 300 mg 3 times daily.  Patient has longstanding history of chronic back pain and feels that the medications help manage her symptoms.     Patient is reporting numbness in bilateral feet left greater than right.  She did have lumbar epidural steroid injection May 2024 and reports that that did not help her leg symptoms.  She is most concerned about the varicosities in her lower extremities and feels that this is contributing to her aching feeling in her legs.  She did request to resume tizanidine which was the muscle relaxant that was DC'd months ago due to hallucination complaints.  This request was denied.  She was encouraged to consider using magnesium supplement 200 mg magnesium glycinate.  She was also asked to follow-up with her primary care provider regarding the varicosities and use support hose when she is up and walking.     For patient's back pain she has had treatment for her lower lumbar facets in the past.  She was open to repeating these as her low back pain was not relieved with the lumbar epidural  steroid injection.  She was reminded that the LESI was conducted due to her complaints of back and lower extremity symptoms however today her primary complaint is her low back pain.  Patient is reporting that her low back pain is inhibiting her quality of life and activities of daily living and feels that this is a disability that she would like to be treated.     Current VAS Score: 5  Functional Deficits: Walking lifting bending back household chores  Duration of pain symptoms: Greater than 2 years  Specific Levels (Only 2 allowed): L4-5 L5-S1  Initial treatment or subsequent? (Per area of the body): Had facet injections in 2022  Radiculopathy & neurogenic claudication ruled out?:  Not applicable  Pre-procedure assessment completed on: Today  Post-procedure assessment completed on: Will be done 2 weeks post procedure  % of pain relief from previous procedure/s: Could not recall if facet injections helped in 2022  VAS Score during diagnostic phase: Not applicable  Duration of relief from previous procedure/s: Not applicable  Prior Conservative Treatment: Medications and physical therapy failed in past      She is not an NSAID candidate due to CKD.  Patient will update her opioid agreement today and urine drug screen.     Total visit time: 30 minutes  More than 50% spent coordinating care, providing patient education, reviewing imaging, discussing conservative vs surgical treatment options and counseling.        Plan:   > Recommend medial branch blocks at bilateral L4-5 L5-S1 level staging towards radiofrequency  > Follow-up 2 weeks after medial branch block  > Continue gabapentin 300 mg 3 times daily and hydrocodone 5/325 3 times daily as needed for pain: Call for refills  > Update opioid agreement and urine drug screen today  > Encouraged patient to follow-up with her primary care doctor regarding varicosities in lower extremities, bladder complaints and abdominal hernia complaints.  > Follow-up in 3 months for  medication follow-up and management      Orders Placed This Encounter   Procedures    Pain Management Drug Panel, Urine         Meds & Refills for this Visit:  Requested Prescriptions        No prescriptions requested or ordered in this encounter         Imaging & Consults:  None     The patient indicates understanding of these issues and agrees to the plan.        ID#[id number]

## 2024-12-17 ENCOUNTER — OFFICE VISIT (OUTPATIENT)
Dept: PAIN CLINIC | Facility: CLINIC | Age: 89
End: 2024-12-17
Payer: MEDICARE

## 2024-12-17 VITALS — SYSTOLIC BLOOD PRESSURE: 132 MMHG | DIASTOLIC BLOOD PRESSURE: 82 MMHG | OXYGEN SATURATION: 97 % | HEART RATE: 73 BPM

## 2024-12-17 DIAGNOSIS — Z79.891 LONG TERM (CURRENT) USE OF OPIATE ANALGESIC: ICD-10-CM

## 2024-12-17 DIAGNOSIS — Z79.891 CHRONIC PRESCRIPTION OPIATE USE: ICD-10-CM

## 2024-12-17 DIAGNOSIS — M47.816 LUMBAR SPONDYLOSIS: ICD-10-CM

## 2024-12-17 DIAGNOSIS — M43.07 LUMBOSACRAL SPONDYLOLYSIS: Primary | ICD-10-CM

## 2024-12-17 DIAGNOSIS — M47.819 ARTHROPATHY OF FACET JOINT: ICD-10-CM

## 2024-12-17 PROCEDURE — 99214 OFFICE O/P EST MOD 30 MIN: CPT | Performed by: NURSE PRACTITIONER

## 2024-12-17 PROCEDURE — 3075F SYST BP GE 130 - 139MM HG: CPT | Performed by: NURSE PRACTITIONER

## 2024-12-17 PROCEDURE — 3079F DIAST BP 80-89 MM HG: CPT | Performed by: NURSE PRACTITIONER

## 2024-12-17 PROCEDURE — 1159F MED LIST DOCD IN RCRD: CPT | Performed by: NURSE PRACTITIONER

## 2024-12-17 RX ORDER — MAGNESIUM 30 MG
30 TABLET ORAL DAILY
COMMUNITY

## 2024-12-17 NOTE — PROGRESS NOTES
Patient presents in office today with reported pain in low back radiating into legs, bilateral knees, bilateral feet    Current pain level reported = 7/10    Last reported dose of norco 6am this morning       Narcotic Contract renewal 9/24/24    Urine Drug screen 9/24/24

## 2024-12-17 NOTE — PROGRESS NOTES
HPI:   Dana Iraheta is a 88 year old female with a history of chronic low back pain here for medication review.  Patient was last seen 9/24/24.  At that visit/lumbar MBB were recommended but not done.     Last procedure for back pain: LESI/conducted 5/28/24.     Patient did have facet joint injections in 2022 she cannot recall if those helped.    Patient is reporting that she has some achiness in her legs.  She does have varicose veins that have been treated many years ago and today was requesting muscle relaxants to help her achy legs.  Patient had been on tizanidine in the past and this was weaned off due to complaints of hallucination.     Today: back pain is her worst symptom/complains of pain across the back which is deep and aching   This is managed with hydrocodone 5/325 3 times daily as well as gabapentin 300 mg 3 times daily.  Patient states hydrocodone continues to provide improved functional ability and analgesia.  The gabapentin also helps especially at nighttime.  Patient also reports that she started using ibuprofen between her hydrocodone tablets that has been helpful.  She recently had lab work and her kidney function is stable.  Patient does have a history of GERD and an ulcer and is on omeprazole.  Patient has not followed up with her primary care to assure that NSAIDs are appropriate at this time.    The pain is described as moderate aching, stabbing, grabbing, shooting, soreness that is constant .  The patient’s activity level has decreased since last visit.  The pain is worst unrelated to time of day.      The following activities will increase the patient’s pain: walking, standing, sitting, lifting, climbing stairs, household chores, outside yard work, staying in the same position for prolonged periods of time, transitioning, any prolonged activity    The following activities decrease the patient’s pain: medications, lying down    Functional Assessment: Patient reports that they are able  to complete all of their ADL's such as eating, bathing, using the toilet, dressing and getting up from a bed or a chair independently.    Patient does live alone.  She uses her walker in her home.  She states that her 2 daughters do help her on an as-needed basis.  Her daughter Karen is here today    Past Medical History:   Diagnosis Date    Asthma (HCC)     Diabetes mellitus (HCC)     GERD (gastroesophageal reflux disease)     Hypothyroid     MRSA (methicillin resistant staph aureus) culture positive 07/2016    Infection to left hand/arm    Osteoarthritis     Other and unspecified hyperlipidemia     Type II or unspecified type diabetes mellitus without mention of complication, not stated as uncontrolled     Unspecified essential hypertension         Past Surgical History:   Procedure Laterality Date    Appendectomy      Cholecystectomy      Hysterectomy  2005    and bl oophorectomy    Lumpectomy left      Other      bladder lift    Other      varicose vein surgery    Other  January 2014    bladder mesh repair    Other surgical history  7/10/2014    Procedure: LUMBAR FACET INJECTION;  Surgeon: Gayle Corral MD;  Location:  [facility]    Other surgical history  9/2/2014    Procedure: RADIOFREQUENCY ABLATION OF THORACIC OR LUMBAR MEDIAL BRANCH;  Surgeon: Gayle Corral MD;  Location:  [facility]    Other surgical history  9/18/2014    Procedure: RADIOFREQUENCY ABLATION OF THORACIC OR LUMBAR MEDIAL BRANCH;  Surgeon: Gayle Corral MD;  Location:  [facility] OR    Other surgical history Bilateral 3/9/2015    Procedure: SACROILIAC JOINT INJECTION BILATERAL;  Surgeon: Gayle Corral MD;  Location: [facility]    Other surgical history Right 9/3/2015    Procedure: RADIOFREQUENCY ABLATION OF THORACIC OR LUMBAR MEDIAL BRANCH;  Surgeon: Gayle Corral MD;  Location:  [facility] OR    Other surgical history Left 9/15/2015    Procedure: RADIOFREQUENCY ABLATION OF THORACIC OR LUMBAR MEDIAL BRANCH;  Surgeon: Ellis  Gayle DAVIS MD;  Location:  MAIN OR    Other surgical history Bilateral 6/20/2016    Procedure: TRANSFORAMINAL LUMBAR EPIDURAL STEROID INJECTION SINGLE LEVEL;  Surgeon: Gayle Corral MD;  Location:  [facility] OR    Stab phlebectomy, varicose veins, 1 extremity; <20 incisions Bilateral 2000    bl vein stripping        Current Medications:  Current Outpatient Medications   Medication Sig Dispense Refill    magnesium 30 MG Oral Tab Take 1 tablet (30 mg total) by mouth daily.      gabapentin 300 MG Oral Cap Take 1 capsule (300 mg total) by mouth 3 (three) times daily. 90 capsule 0    HYDROcodone-acetaminophen 5-325 MG Oral Tab Take 1 tablet by mouth every 8 (eight) hours as needed for Pain. 90 tablet 0    LEVOTHYROXINE 112 MCG Oral Tab TAKE 1 TABLET(112 MCG) BY MOUTH BEFORE BREAKFAST 90 tablet 0    atorvastatin 40 MG Oral Tab Take 1 tablet (40 mg total) by mouth every evening. 90 tablet 0    valsartan 80 MG Oral Tab Take 1 tablet (80 mg total) by mouth daily. 90 tablet 0    Omeprazole 40 MG Oral Capsule Delayed Release Take 1 capsule (40 mg total) by mouth every morning. 90 capsule 0    ofloxacin 0.3 % Otic Solution Take 5 drops twice a day 1 each 1    ketoconazole 2 % External Cream Apply 1 Application topically daily. 15 g 0    Ascorbic Acid (VITAMIN C OR) Take by mouth.      Glucosamine-Chondroit-Vit C-Mn (GLUCOSAMINE 1500 COMPLEX) Oral Cap Take 1,500 mg by mouth daily.      Cholecalciferol 125 MCG (5000 UT) Oral Tab Take 1 tablet (5,000 Units total) by mouth daily.      vitamin E 100 UNITS Oral Cap Take 1 capsule (100 Units total) by mouth daily.      acetaminophen 160 MG Oral Chew Tab Chew 1 tablet (160 mg total) by mouth daily as needed for Pain.      Albuterol Sulfate  (90 Base) MCG/ACT Inhalation Aero Soln Inhale 1 puff into the lungs every 6 (six) hours as needed for Wheezing.      nitrofurantoin monohydrate macro 100 MG Oral Cap Take 1 capsule (100 mg total) by mouth 2 (two) times daily. 10 capsule 0     doxycycline 100 MG Oral Cap Take 1 capsule (100 mg total) by mouth 2 (two) times daily. 14 capsule 0      Patient requires assistance with: driving, walking up or down stairs, shopping, household chores (laundry, dishes, vacuuming, etc), outdoor yard work (mowing lawn, trimming, raking, gardening, etc)    Patient lives alone/she has children that help her out.    Have you recently had any feelings of harming yourself or others? The patient's response was no.    Physical Exam:   /82 (BP Location: Left arm, Patient Position: Sitting, Cuff Size: adult)   Pulse 73   SpO2 97%   VAS Pain Score:  7/10  General Appearance: Well developed, well nourished, normal build, independent body habitus, no apparent physical disabilities, well groomed, Ambulates with cane    Neurological Exam: WNL-Orientation to time, place and person, normal mood & effect, normal concentration & attention span  Inspection:   Nonantalgic  Gross motor or sensory deficits  Ambulating with walker slow steady pace  Strength bilateral upper and lower extremities 5 out of 5  Negative swelling to bilateral lower extremities + varicosities noted left > right side  DPs 2+ bilaterally  + Axial low back pain with extension twisting bending side-to-side motions  + Tenderness to palpation over lower lumbar facets and paraspinals  Lumbar skin clean dry and intact  Radiology/Lab Test Reviewed: No new images to review  Last urine drug screen in 2/6/24  was within normal limits  PROCEDURE:  MRI SPINE LUMBAR (CPT=72148)     COMPARISON:  None.     INDICATIONS:  M54.16 Radiculopathy, lumbar region     TECHNIQUE:  Multiplanar T1 and T2 weighted images including fat suppression sequences.  Images acquired in sagittal and axial planes.       PATIENT STATED HISTORY: (As transcribed by Technologist)  Patient has severe low back pain that radiates down bilateral legs.      FINDINGS:  Rightward curvature of the thoracolumbar spine.  There is normal lumbar lordosis  with trace retrolisthesis of L1 on L2 by 4 mm and L2 on L3 by 3 mm.  There is trace anterolisthesis of L5 on S1 by 5 mm..  Vertebral body heights are well-maintained.    Degenerative disc space loss, disc desiccation, endplate spurring, and degenerative endplate marrow signal changes throughout the lumbar spine, most pronounced at L1-2.  No focal worrisome marrow signal abnormality is seen.     The distal spinal cord and conus medullaris have a normal signal and morphology.  The conus medullaris terminates at the lower L1 level.  The roots of the cauda equina are unremarkable.  No focal mass or fluid collection is seen in the lumbar spinal  canal.  The paraspinal soft tissues are unremarkable.  Partially imaged probable renal cysts.     T12-L1:  Diffuse disc bulge with endplate osteophytes and mild facet arthropathy. There is no significant spinal canal or neural foraminal stenosis.     L1-2:  Diffuse disc bulge with endplate osteophytes and a superimposed left foraminal/far lateral disc protrusion.  Mild facet arthropathy.  No significant spinal canal stenosis.  Mild right and moderate left neural foraminal stenosis.     L2-3:  Diffuse disc bulge with endplate osteophytes.  Mild facet arthropathy with thickening of ligamentum flavum.  No significant spinal canal stenosis.  Moderate bilateral neural foraminal stenosis.     L3-4:  Diffuse disc bulge with endplate osteophytes.  Mild to moderate facet arthropathy with thickening of ligamentum flavum.  No significant spinal canal stenosis.  Mild bilateral neural foraminal stenosis.     L4-5:  Diffuse disc bulge with endplate osteophytes.  Mild facet arthropathy with thickening of ligamentum flavum.  No significant spinal canal stenosis.  Mild bilateral neural foraminal stenosis.     L5-S1:  There is uncovering of the intervertebral disc with a diffuse disc bulge and endplate osteophytes.  There is moderate to severe facet arthropathy.  There is no significant spinal  canal stenosis.  There is mild to moderate right and mild left  neural foraminal stenosis.        Impression  CONCLUSION:       1. Multilevel degenerative changes in the lumbar spine are seen without significant spinal canal stenosis at any level.     2. Mild right and moderate left neural foraminal stenosis at L1-2.  Moderate bilateral neural foraminal stenosis at L2-3.  Mild bilateral neural foraminal stenosis at L3-4 and L4-5.  Mild to moderate right and mild left neural foraminal stenosis at  L5-S1.     3. Facet arthropathy throughout the lumbar spine, most pronounced at L5-S1.     Please see above for further details.        Dictated by: Navneet Mckay MD on 2/21/2020 at 14:24      Approved by: Navneet Mckay MD on 2/21/2020 at 14:28          PROCEDURE:  XR LUMBAR SPINE (MIN 4 VIEWS) (CPT=72110)     TECHNIQUE:  AP, lateral, oblique, and coned down L5-S1 views were obtained.     COMPARISON:  None.     INDICATIONS:  M51.36 DDD (degenerative disc disease), lumbar     PATIENT STATED HISTORY: (As transcribed by Technologist)  Patient has had chronic lower back pain for years. Patient has left sided pain down leg, no recent injury, and lumbar surgery three years ago.                      Impression  CONCLUSION:    No evidence for acute compression or other fracture.  Multilevel degenerative disc and facet changes, with disc space narrowing most notable L1-L2 and L2-L3 to a lesser extent, with degenerative grade 1 retrolisthesis of L1, and grade 1  degenerative retrolisthesis of L2.  No other subluxations.  Facet arthropathy is most notable L5-S1.  Aortic calcifications are seen.  LOCATION:  [facility]        Dictated by (CST): Dylon Jefferson MD on 8/10/2023 at 8:50 AM      Finalized by (CST): Dylon Jefferson MD on 8/10/2023 at 8:51 AM    No results found for: \"HEMOGLOBIN\"  Lab Results   Component Value Date    .0 10/05/2024    .0 04/27/2024    .0 10/02/2023         Patient Education: Patient was  advised to continue normal activities as tolerated and was advised against any form of bedrest, since recent guidelines promote and encourage physical activity for improvment of functionality and overall pain.  (Family Practice Vol. 16, No. 1, 84-20Â© [employer] 1999 ).    The patient is on long term opioids and reports  Analgesia: good (provide pain relief)  Anesthesia: none (sedation response)  Adverse effect: none  ADLs: improved (improved or increased dysfunction)  Aberrant use behavior: none (missed appointment/lost meds/early refills)      Complementary approaches to help manage pain: OTC/advil     Patient provided education on risks of chronic opioid use including but not limited to the risk for:   Constipation, nausea, vomiting, respiratory depression, sleep disordered breathing, impaired cognition, somnolence, mental cloudiness, sedation especially if used in combination with other sedating medications such as zdrugs, benzodiazepines, muscle relaxants, alcohol, THC products, and neuropathic medications.  In addition chronic opioid use can result in urinary retention, pruritis, endocrine alterations including weight gain, poor glycemic control, mood disorders, depression, altered sexual function, and weakened bones, making fractures a greater risk.  Also discussed risk for opioid induced neurotoxicity, increased risk for infections such as pneumonia as opioids have been known to reduce immune response.      Reference:  VITALIY Garcia, ELEANOR Gerard, ALEKS Gore (2022) Up-to-date: Prevention and management of side effects in patients receiving opioids for chronic pain     The patient had their urine drug screen 9/24/24 as required by their care contract and results were as expected based on medications prescribed, and this was reviewed with them today.    Discussion regarding the addictive nature of the opioid medications and the patients' likelihood of being physically vs. Psychologically  addicted  Discussion regarding current morphine equivalents which at this time is 15 MEDD and mutual goals addressed and implemented     The patient acknowledges the understanding of the addictive nature of this med AND the issue of possible tolerance and/or unintentional over use/dose  The patient acknowledges the concomitant use of other medications in particular anti-anxiety/benzodiazepines or sleeping medications can have untoward interactions with opiates and accepts that risks of the same up to and including possible hospitalization and or death .    The patient has been informed of the level of opiate currently being taken is not enough to warrant antidote medication/narcan     Suggested a weaning trial to see if we can remove the medication from the patients active meds, however at this time the patient does not want to pursue weaning or cessation of the medication at this time reporting that the medications are significantly improving their quality of life and ability to maintain all activities of daily living and are not resulting in any significant side effect.    The issue of addiction and dependence will be discussed routinely with the patient and the patient understands the need for this discussion and is agreeable to the same    Patient educated and verbalized understanding.  Medical Decision Making:   Diagnosis:    Encounter Diagnoses   Name Primary?    Lumbosacral spondylolysis Yes    Arthropathy of facet joint     Long term (current) use of opiate analgesic     Lumbar spondylosis     Chronic prescription opiate use            Impression:   Patient is an 88-year-old female with past medical history of MDD, type 2 diabetes with microalbumin being urea, hyperlipidemia, hypertension, delusional.  Cytosis, carotid atherosclerosis, hypothyroidism, history of stroke, at risk for falls, with chronic back pain bilateral radicular symptoms.  Patient uses hydrocodone 5/325 3 times daily as needed along with  gabapentin 300 mg 3 times daily.  Patient has longstanding history of chronic back pain and feels that the medications help manage her symptoms.    Patient is reporting her most significant pain is in her low back across the back.  She uses heat and stretching at home.  Patient did not feel that the lumbar epidural steroid injection helped her back symptoms.  She did have facet injections in 2022 but cannot recall if those were helpful.  She was reminded that medial branch blocks were recommended back in September 2024 however her and her daughter were unable to coordinate time to have these injections.    During the visit I did request that her daughter come in the office to discuss the medial branch blocks.  Risks and benefits of these blocks were discussed with patient and daughter and they do wish to proceed.  They were informed that these blocks are diagnostic in terms of relief we are wanting to know if patient obtains relief during the diagnostic phase of the local anesthetic so that we can proceed to the radiofrequency.  I informed him I will send through [employer] a pain diary for them to keep track the day of the procedure    They were also asked to follow-up with the primary care provider to see if patient can be on long-term NSAIDs/prescription strength due to history of GERD and ulcer being on omeprazole.  Patient denies having any bleeding in stool.  They will discuss with the primary care provider      Current VAS Score: 7  Functional Deficits: Walking lifting bending back household chores  Duration of pain symptoms: Greater than 2 years  Specific Levels (Only 2 allowed): L4-5 L5-S1  Initial treatment or subsequent? (Per area of the body): Had facet injections in 2022  Radiculopathy & neurogenic claudication ruled out?:  Not applicable  Pre-procedure assessment completed on: Today  Post-procedure assessment completed on: Will be done 2 weeks post procedure  % of pain relief from previous procedure/s:  Could not recall if facet injections helped in   VAS Score during diagnostic phase: Not applicable  Duration of relief from previous procedure/s: Not applicable  Prior Conservative Treatment: Medications and physical therapy failed in past     She is not an NSAID candidate due to CKD.  Patient will update her opioid agreement today and urine drug screen.    Total visit time: 30 minutes  More than 50% spent coordinating care, providing patient education, reviewing imaging, discussing conservative vs surgical treatment options and counseling.       Plan:   > Recommend medial branch blocks at bilateral L4-5 L5-S1 level staging towards radiofrequency discussed with daughter and patient today: Will discuss with team since prior authorization  in November however we may be able to utilize that referral to move forward with medial branch blocks  > Follow-up 2 weeks after medial branch block  > Continue gabapentin 300 mg 3 times daily and hydrocodone 5/325 3 times daily as needed for pain: Call for refills  > Encouraged patient to follow-up with her primary care doctor regarding use of chronic NSAIDs, bladder complaints and abdominal hernia complaints.  > Follow-up in 3 months for medication follow-up and management  No orders of the defined types were placed in this encounter.      Meds & Refills for this Visit:  Requested Prescriptions      No prescriptions requested or ordered in this encounter       Imaging & Consults:  None    The patient indicates understanding of these issues and agrees to the plan.      ID#[id number]

## 2024-12-17 NOTE — PATIENT INSTRUCTIONS
Refill policies:    Allow 2-3 business days for refills; controlled substances may take longer.  Contact your pharmacy at least 5 days prior to running out of medication and have them send an electronic request or submit request through the “request refill” option in your [employer] account.  Refills are not addressed on weekends; covering physicians do not authorize routine medications on weekends.  No narcotics or controlled substances are refilled after noon on Fridays or by on call physicians.  By law, narcotics must be electronically prescribed.  A 30 day supply with no refills is the maximum allowed.  If your prescription is due for a refill, you may be due for a follow up appointment.  To best provide you care, patients receiving routine medications need to be seen at least once a year.  Patients receiving narcotic/controlled substance medications need to be seen at least once every 3 months.  In the event that your preferred pharmacy does not have the requested medication in stock (e.g. Backordered), it is your responsibility to find another pharmacy that has the requested medication available.  We will gladly send a new prescription to that pharmacy at your request.    Scheduling Tests:    If your physician has ordered radiology tests such as MRI or CT scans, please contact Central Scheduling at [phone] right away to schedule the test.  Once scheduled, the [facility] Centralized Referral Team will work with your insurance carrier to obtain pre-certification or prior authorization.  Depending on your insurance carrier, approval may take 3-10 days.  It is highly recommended patients assure they have received an authorization before having a test performed.  If test is done without insurance authorization, patient may be responsible for the entire amount billed.      Precertification and Prior Authorizations:  If your physician has recommended that you have a procedure or additional testing performed the [facility]  Centralized Referral Team will contact your insurance carrier to obtain pre-certification or prior authorization.    You are strongly encouraged to contact your insurance carrier to verify that your procedure/test has been approved and is a COVERED benefit.  Although the [facility] Centralized Referral Team does its due diligence, the insurance carrier gives the disclaimer that \"Although the procedure is authorized, this does not guarantee payment.\"    Ultimately the patient is responsible for payment.   Thank you for your understanding in this matter.  Paperwork Completion:  If you require FMLA or disability paperwork for your recovery, please make sure to either drop it off or have it faxed to our office at [phone]. Be sure the form has your name and date of birth on it.  The form will be faxed to our Forms Department and they will complete it for you.  There is a 25$ fee for all forms that need to be filled out.  Please be aware there is a 10-14 day turnaround time.  You will need to sign a release of information (HARSHIL) form if your paperwork does not come with one.  You may call the Forms Department with any questions at [phone].  Their fax number is [phone].

## 2024-12-27 DIAGNOSIS — E11.29 TYPE 2 DIABETES MELLITUS WITH MICROALBUMINURIA, WITHOUT LONG-TERM CURRENT USE OF INSULIN (HCC): ICD-10-CM

## 2024-12-27 DIAGNOSIS — E78.2 MIXED HYPERLIPIDEMIA: ICD-10-CM

## 2024-12-27 DIAGNOSIS — R80.9 TYPE 2 DIABETES MELLITUS WITH MICROALBUMINURIA, WITHOUT LONG-TERM CURRENT USE OF INSULIN (HCC): ICD-10-CM

## 2024-12-28 DIAGNOSIS — K21.9 GASTROESOPHAGEAL REFLUX DISEASE WITHOUT ESOPHAGITIS: Primary | ICD-10-CM

## 2024-12-28 DIAGNOSIS — M47.816 LUMBAR FACET ARTHROPATHY: ICD-10-CM

## 2024-12-28 DIAGNOSIS — G89.29 CHRONIC PAIN: ICD-10-CM

## 2024-12-28 DIAGNOSIS — M51.379 DDD (DEGENERATIVE DISC DISEASE), LUMBOSACRAL: ICD-10-CM

## 2024-12-28 DIAGNOSIS — I10 ESSENTIAL HYPERTENSION: ICD-10-CM

## 2024-12-28 DIAGNOSIS — M54.16 LUMBAR RADICULITIS: ICD-10-CM

## 2024-12-28 DIAGNOSIS — M46.1 BILATERAL SACROILIITIS (HCC): ICD-10-CM

## 2024-12-28 RX ORDER — ATORVASTATIN CALCIUM 40 MG/1
40 TABLET, FILM COATED ORAL EVERY EVENING
Qty: 90 TABLET | Refills: 0 | Status: SHIPPED | OUTPATIENT
Start: 2024-12-28

## 2024-12-28 NOTE — TELEPHONE ENCOUNTER
Last time medication was refilled 09/10/2024  Last office visit  09/30/2024  Next office visit due/scheduled No future appointments.        Passed protocol, Medication sent.

## 2024-12-29 RX ORDER — OMEPRAZOLE 40 MG/1
40 CAPSULE, DELAYED RELEASE ORAL EVERY MORNING
Qty: 90 CAPSULE | Refills: 0 | Status: SHIPPED | OUTPATIENT
Start: 2024-12-29

## 2024-12-29 RX ORDER — VALSARTAN 80 MG/1
80 TABLET ORAL DAILY
Qty: 90 TABLET | Refills: 0 | Status: SHIPPED | OUTPATIENT
Start: 2024-12-29

## 2024-12-29 RX ORDER — ALBUTEROL SULFATE 90 UG/1
1 INHALANT RESPIRATORY (INHALATION) EVERY 6 HOURS PRN
Qty: 8 G | Refills: 0 | Status: SHIPPED | OUTPATIENT
Start: 2024-12-29

## 2024-12-29 RX ORDER — VALSARTAN 80 MG/1
80 TABLET ORAL DAILY
Qty: 90 TABLET | Refills: 0 | OUTPATIENT
Start: 2024-12-29

## 2024-12-29 NOTE — TELEPHONE ENCOUNTER
Last time medication was refilled: 4/11/24  Next office visit due/scheduled: no apt  Last office visit: 9/30/24  Last Labs: 10/5/24

## 2024-12-29 NOTE — TELEPHONE ENCOUNTER
Last time medication was refilled: 9/10/24  Next office visit due/scheduled: no apt  Last office visit: 9/30/24  Last Labs: 10/5/24

## 2025-01-03 DIAGNOSIS — F11.90 CHRONIC NARCOTIC USE: ICD-10-CM

## 2025-01-03 DIAGNOSIS — M47.816 LUMBAR SPONDYLOSIS: ICD-10-CM

## 2025-01-03 DIAGNOSIS — M54.16 LUMBAR RADICULOPATHY: ICD-10-CM

## 2025-01-03 DIAGNOSIS — M51.369 DDD (DEGENERATIVE DISC DISEASE), LUMBAR: ICD-10-CM

## 2025-01-03 DIAGNOSIS — M43.07 LUMBOSACRAL SPONDYLOLYSIS: ICD-10-CM

## 2025-01-03 DIAGNOSIS — Z79.891 CHRONIC PRESCRIPTION OPIATE USE: ICD-10-CM

## 2025-01-03 DIAGNOSIS — M47.819 ARTHROPATHY OF FACET JOINT: ICD-10-CM

## 2025-01-03 DIAGNOSIS — Z79.891 LONG TERM (CURRENT) USE OF OPIATE ANALGESIC: ICD-10-CM

## 2025-01-03 NOTE — TELEPHONE ENCOUNTER
Patient calling to request refill of HYDROcodone-acetaminophen 5-325 MG Oral Tab AND gabapentin 300 MG Oral Cap   Pharmacy [facility] #[id number] [place] [address] [address] [facility] [address], [phone], [phone]     Patient informed of 48 hour refill policy excluding weekends and holidays.   Informed patient prescription is sent directly to pharmacy.    Further explained patient will not receive a call back once prescription is ready.

## 2025-01-03 NOTE — TELEPHONE ENCOUNTER
Medication:   gabapentin 300 MG Oral Cap     Date of last refill: 12/4/24      Medication:   HYDROcodone-acetaminophen 5-325 MG Oral Tab     Date of last refill: 12/6/24  [place] 12[place]5/24    Last office visit: 9/24/24  Due back to clinic per last office note:  3 months  Date next office visit scheduled:  NA    Last URINE Screen: 9/24/24  Screen Results:    Results as expected based on medication prescribed     Narcotic Contract EXPIRATION date: 9/24/25    Last OV note recommendation: Plan:   > Recommend medial branch blocks at bilateral L4-5 L5-S1 level staging towards radiofrequency  > Follow-up 2 weeks after medial branch block  > Continue gabapentin 300 mg 3 times daily and hydrocodone 5/325 3 times daily as needed for pain: Call for refills  > Update opioid agreement and urine drug screen today  > Encouraged patient to follow-up with her primary care doctor regarding varicosities in lower extremities, bladder complaints and abdominal hernia complaints.  > Follow-up in 3 months for medication follow-up and management

## 2025-01-06 RX ORDER — HYDROCODONE BITARTRATE AND ACETAMINOPHEN 5; 325 MG/1; MG/1
1 TABLET ORAL EVERY 8 HOURS PRN
Qty: 90 TABLET | Refills: 0 | Status: SHIPPED | OUTPATIENT
Start: 2025-01-06 | End: 2025-02-05

## 2025-01-06 RX ORDER — GABAPENTIN 300 MG/1
300 CAPSULE ORAL 3 TIMES DAILY
Qty: 90 CAPSULE | Refills: 0 | Status: SHIPPED | OUTPATIENT
Start: 2025-01-06

## 2025-01-20 DIAGNOSIS — E03.9 ACQUIRED HYPOTHYROIDISM: ICD-10-CM

## 2025-01-21 RX ORDER — LEVOTHYROXINE SODIUM 112 UG/1
112 TABLET ORAL
Qty: 90 TABLET | Refills: 0 | Status: SHIPPED | OUTPATIENT
Start: 2025-01-21

## 2025-01-25 DIAGNOSIS — E03.9 ACQUIRED HYPOTHYROIDISM: ICD-10-CM

## 2025-01-25 RX ORDER — LEVOTHYROXINE SODIUM 112 UG/1
112 TABLET ORAL
Qty: 90 TABLET | Refills: 0 | OUTPATIENT
Start: 2025-01-25

## 2025-01-30 NOTE — PROGRESS NOTES
Dana Iraheta is a 89 year old female.  HPI:   Patient presents today with complaints of urinary frequency/urgency. States she has been experiencing for few years but recently has increased. Frequently when patient get's the urinary sensation she experiences urinary incontinence.   Pt has been experiencing nocturia at least 7 times per night.  States she does experience suprapubic pain.  Denies back pain, fever, hematuria, dysuria, flank pain.  Pt states she has had bladder lift in the past for bladder prolapse.    HTN-does not monitor BP at home. Adherent to medication, denies side effects.     DM is diet controlled. Last A1C was 6.7.    Current Outpatient Medications   Medication Sig Dispense Refill    LEVOTHYROXINE 112 MCG Oral Tab TAKE 1 TABLET(112 MCG) BY MOUTH BEFORE BREAKFAST 90 tablet 0    HYDROcodone-acetaminophen 5-325 MG Oral Tab Take 1 tablet by mouth every 8 (eight) hours as needed for Pain. 90 tablet 0    gabapentin 300 MG Oral Cap Take 1 capsule (300 mg total) by mouth 3 (three) times daily. 90 capsule 0    albuterol 108 (90 Base) MCG/ACT Inhalation Aero Soln Inhale 1 puff into the lungs every 6 (six) hours as needed for Wheezing. 8 g 0    valsartan 80 MG Oral Tab Take 1 tablet (80 mg total) by mouth daily. 90 tablet 0    Omeprazole 40 MG Oral Capsule Delayed Release Take 1 capsule (40 mg total) by mouth every morning. 90 capsule 0    ATORVASTATIN 40 MG Oral Tab TAKE 1 TABLET(40 MG) BY MOUTH EVERY EVENING 90 tablet 0    magnesium 30 MG Oral Tab Take 1 tablet (30 mg total) by mouth daily.      ofloxacin 0.3 % Otic Solution Take 5 drops twice a day 1 each 1    doxycycline 100 MG Oral Cap Take 1 capsule (100 mg total) by mouth 2 (two) times daily. 14 capsule 0    ketoconazole 2 % External Cream Apply 1 Application topically daily. 15 g 0    Ascorbic Acid (VITAMIN C OR) Take by mouth.      Glucosamine-Chondroit-Vit C-Mn (GLUCOSAMINE 1500 COMPLEX) Oral Cap Take 1,500 mg by mouth daily.       Cholecalciferol 125 MCG (5000 UT) Oral Tab Take 1 tablet (5,000 Units total) by mouth daily.      vitamin E 100 UNITS Oral Cap Take 1 capsule (100 Units total) by mouth daily.      acetaminophen 160 MG Oral Chew Tab Chew 1 tablet (160 mg total) by mouth daily as needed for Pain.      nitrofurantoin monohydrate macro 100 MG Oral Cap Take 1 capsule (100 mg total) by mouth 2 (two) times daily for 5 days. (Patient not taking: Reported on 2/1/2025) 10 capsule 0      Past Medical History:    Asthma (HCC)    Diabetes mellitus (HCC)    GERD (gastroesophageal reflux disease)    Hypothyroid    MRSA (methicillin resistant staph aureus) culture positive    Infection to left hand/arm    Osteoarthritis    Other and unspecified hyperlipidemia    Type II or unspecified type diabetes mellitus without mention of complication, not stated as uncontrolled    Unspecified essential hypertension      Social History:  Social History     Socioeconomic History    Marital status:    Tobacco Use    Smoking status: Never    Smokeless tobacco: Never   Vaping Use    Vaping status: Never Used   Substance and Sexual Activity    Alcohol use: No     Alcohol/week: 0.0 standard drinks of alcohol    Drug use: No   Other Topics Concern    Caffeine Concern Yes     Comment: 1-2 cups daily    Exercise No     Comment: rare     Social Drivers of Health      Received [facility], [facility]    Social Connections    Received from [facility], [facility]    Housing Stability         REVIEW OF SYSTEMS:   GENERAL HEALTH: feels well otherwise  SKIN: denies any unusual skin lesions or rashes  RESPIRATORY: no shortness of breath with exertion  CARDIOVASCULAR: denies chest pain on exertion  GI: no nausea or vomiting  NEURO: denies headaches    EXAM:   /80   Pulse 67   Temp 97 °F (36.1 °C) (Temporal)   Resp 18   Ht 4' 11\" (1.499 m)   Wt 153 lb (69.4 kg)   SpO2 97%    BMI 30.90 kg/m²   GENERAL: well developed, well nourished,in no apparent distress  SKIN: no rashes,no suspicious lesions  CV: RRR no murmur  PULM: cta b/l breathing is unlabored  GI: good BS's,no masses, HSM; suprapubic tenderness, no CVAT  Bilateral barefoot skin diabetic exam is normal, visualized feet and the appearance is normal.  Bilateral monofilament/sensation of both feet is normal.  Pulsation pedal pulse exam of both lower legs/feet is normal as well.     ASSESSMENT AND PLAN:     Encounter Diagnoses   Name Primary?    Urinary frequency Yes    Urinary urgency     Acute cystitis without hematuria     History of bladder surgery     Hypertension associated with diabetes (HCC)     Type 2 diabetes mellitus with microalbuminuria, without long-term current use of insulin (HCC)         UA in office +leuks, blood. Urine culture sent. Start Macrobid.   Referral to urology placed.  HTN- controlled  DM- diet controlled. Check microalbumin/creatine ratio  Requested Prescriptions     Signed Prescriptions Disp Refills    nitrofurantoin monohydrate macro 100 MG Oral Cap 10 capsule 0     Sig: Take 1 capsule (100 mg total) by mouth 2 (two) times daily for 5 days.     Patient not taking: Reported on 2/1/2025        Instructions given on increasing fluid intake, bladder emptying after intercourse.   The patient indicates understanding of these issues and agrees to the plan.  The patient is asked to return in 3 days if not better. Call if fever, vomiting, worsening symptoms.   Return in 4 wks for medicare visit.

## 2025-02-01 ENCOUNTER — OFFICE VISIT (OUTPATIENT)
Dept: INTERNAL MEDICINE CLINIC | Facility: CLINIC | Age: 89
End: 2025-02-01
Payer: MEDICARE

## 2025-02-01 VITALS
HEART RATE: 67 BPM | OXYGEN SATURATION: 97 % | WEIGHT: 153 LBS | BODY MASS INDEX: 30.84 KG/M2 | TEMPERATURE: 97 F | SYSTOLIC BLOOD PRESSURE: 135 MMHG | HEIGHT: 59 IN | DIASTOLIC BLOOD PRESSURE: 80 MMHG | RESPIRATION RATE: 18 BRPM

## 2025-02-01 DIAGNOSIS — R80.9 TYPE 2 DIABETES MELLITUS WITH MICROALBUMINURIA, WITHOUT LONG-TERM CURRENT USE OF INSULIN (HCC): ICD-10-CM

## 2025-02-01 DIAGNOSIS — E11.29 TYPE 2 DIABETES MELLITUS WITH MICROALBUMINURIA, WITHOUT LONG-TERM CURRENT USE OF INSULIN (HCC): ICD-10-CM

## 2025-02-01 DIAGNOSIS — N30.00 ACUTE CYSTITIS WITHOUT HEMATURIA: ICD-10-CM

## 2025-02-01 DIAGNOSIS — Z98.890 HISTORY OF BLADDER SURGERY: ICD-10-CM

## 2025-02-01 DIAGNOSIS — E11.59 HYPERTENSION ASSOCIATED WITH DIABETES (HCC): ICD-10-CM

## 2025-02-01 DIAGNOSIS — R39.15 URINARY URGENCY: ICD-10-CM

## 2025-02-01 DIAGNOSIS — R35.0 URINARY FREQUENCY: Primary | ICD-10-CM

## 2025-02-01 DIAGNOSIS — I15.2 HYPERTENSION ASSOCIATED WITH DIABETES (HCC): ICD-10-CM

## 2025-02-01 LAB
BILIRUBIN: NEGATIVE
CREAT UR-SCNC: 128.6 MG/DL
GLUCOSE (URINE DIPSTICK): NEGATIVE MG/DL
KETONES (URINE DIPSTICK): NEGATIVE MG/DL
MICROALBUMIN UR-MCNC: 24.5 MG/DL
MICROALBUMIN/CREAT 24H UR-RTO: 190.5 UG/MG (ref ?–30)
MULTISTIX LOT#: ABNORMAL NUMERIC
NITRITE, URINE: NEGATIVE
PH, URINE: 5 (ref 4.5–8)
PROTEIN (URINE DIPSTICK): 100 MG/DL
SPECIFIC GRAVITY: >=1.03 (ref 1–1.03)
UROBILINOGEN,SEMI-QN: 0.2 MG/DL (ref 0–1.9)

## 2025-02-01 PROCEDURE — 87086 URINE CULTURE/COLONY COUNT: CPT

## 2025-02-01 PROCEDURE — 81003 URINALYSIS AUTO W/O SCOPE: CPT

## 2025-02-01 PROCEDURE — 90662 IIV NO PRSV INCREASED AG IM: CPT

## 2025-02-01 PROCEDURE — 3075F SYST BP GE 130 - 139MM HG: CPT

## 2025-02-01 PROCEDURE — 82043 UR ALBUMIN QUANTITATIVE: CPT

## 2025-02-01 PROCEDURE — 82570 ASSAY OF URINE CREATININE: CPT

## 2025-02-01 PROCEDURE — 3008F BODY MASS INDEX DOCD: CPT

## 2025-02-01 PROCEDURE — 1160F RVW MEDS BY RX/DR IN RCRD: CPT

## 2025-02-01 PROCEDURE — 1170F FXNL STATUS ASSESSED: CPT

## 2025-02-01 PROCEDURE — 1159F MED LIST DOCD IN RCRD: CPT

## 2025-02-01 PROCEDURE — 99214 OFFICE O/P EST MOD 30 MIN: CPT

## 2025-02-01 PROCEDURE — G0008 ADMIN INFLUENZA VIRUS VAC: HCPCS

## 2025-02-01 PROCEDURE — G2211 COMPLEX E/M VISIT ADD ON: HCPCS

## 2025-02-01 PROCEDURE — 3079F DIAST BP 80-89 MM HG: CPT

## 2025-02-01 RX ORDER — NITROFURANTOIN 25; 75 MG/1; MG/1
100 CAPSULE ORAL 2 TIMES DAILY
Qty: 10 CAPSULE | Refills: 0 | Status: SHIPPED | OUTPATIENT
Start: 2025-02-01 | End: 2025-02-06

## 2025-02-03 DIAGNOSIS — F11.90 CHRONIC NARCOTIC USE: ICD-10-CM

## 2025-02-03 DIAGNOSIS — M47.819 ARTHROPATHY OF FACET JOINT: ICD-10-CM

## 2025-02-03 DIAGNOSIS — Z79.891 CHRONIC PRESCRIPTION OPIATE USE: ICD-10-CM

## 2025-02-03 DIAGNOSIS — Z79.891 LONG TERM (CURRENT) USE OF OPIATE ANALGESIC: ICD-10-CM

## 2025-02-03 DIAGNOSIS — M54.16 LUMBAR RADICULOPATHY: ICD-10-CM

## 2025-02-03 DIAGNOSIS — M51.369 DDD (DEGENERATIVE DISC DISEASE), LUMBAR: ICD-10-CM

## 2025-02-03 DIAGNOSIS — M43.07 LUMBOSACRAL SPONDYLOLYSIS: ICD-10-CM

## 2025-02-03 DIAGNOSIS — M47.816 LUMBAR SPONDYLOSIS: ICD-10-CM

## 2025-02-03 RX ORDER — HYDROCODONE BITARTRATE AND ACETAMINOPHEN 5; 325 MG/1; MG/1
1 TABLET ORAL EVERY 8 HOURS PRN
Qty: 90 TABLET | Refills: 0 | Status: SHIPPED | OUTPATIENT
Start: 2025-02-03 | End: 2025-03-05

## 2025-02-03 RX ORDER — GABAPENTIN 300 MG/1
300 CAPSULE ORAL 3 TIMES DAILY
Qty: 90 CAPSULE | Refills: 0 | Status: SHIPPED | OUTPATIENT
Start: 2025-02-03

## 2025-02-03 NOTE — TELEPHONE ENCOUNTER
Medication:   gabapentin 300 MG Oral Cap     Date of last refill: 25      Medication:   HYDROcodone-acetaminophen 5-325 MG Oral Tab     Date of last refill: 25  [place]    Last office visit: 24  Due back to clinic per last office note:  3 months  Date next office visit scheduled:  None    Last URINE Screen: 24  Screen Results:    Results as expected based on medication prescribed     Narcotic Contract EXPIRATION date: 25    Last OV note recommendation:    Plan:   > Recommend medial branch blocks at bilateral L4-5 L5-S1 level staging towards radiofrequency discussed with daughter and patient today: Will discuss with team since prior authorization  in November however we may be able to utilize that referral to move forward with medial branch blocks  > Follow-up 2 weeks after medial branch block  > Continue gabapentin 300 mg 3 times daily and hydrocodone 5/325 3 times daily as needed for pain: Call for refills  > Encouraged patient to follow-up with her primary care doctor regarding use of chronic NSAIDs, bladder complaints and abdominal hernia complaints.  > Follow-up in 3 months for medication follow-up and management  No orders of the defined types were placed in this encounter.

## 2025-02-03 NOTE — TELEPHONE ENCOUNTER
Patient calling to request refill of gabapentin 300 MG Oral Cap AND HYDROcodone-acetaminophen 5-325 MG Oral Tab   Pharmacy [facility] #[id number] [place] [address] [address] AT [facility] [address], [phone], [phone]     Patient informed of 48 hour refill policy excluding weekends and holidays.   Informed patient prescription is sent directly to pharmacy.    Further explained patient will not receive a call back once prescription is ready.

## 2025-02-28 NOTE — PROGRESS NOTES
Subjective:   Dana Iraheta is a 89 year old female who presents for a MA AHA (Medicare Advantage Annual Health Assessment) and Subsequent Annual Wellness visit (Pt already had Initial Annual Wellness) and scheduled follow up of multiple significant but stable problems.     Normal states of health. Denies any chest pain, shortness of breath.  Is scheduled to see urogyne next month for frequent urination.    History/Other:   Fall Risk Assessment:   She has been screened for Falls and is low risk.      Cognitive Assessment:   She had a completely normal cognitive assessment - see flowsheet entries       Functional Ability/Status:   Dana Iraheta has some abnormal functions as listed below:  She has Driving difficulties based on screening of functional status. She has difficulties Managing Money/Bills based on screening of functional status.She has difficulties Shopping for Groceries based on screening of functional status. She has difficulties Taking Meds as Rx'd based on screening of functional status. She has difficulties Affording Meds based on screening of functional status. She has Vision problems based on screening of functional status. She has Walking problems based on screening of functional status. She has problems with Memory based on screening of functional status.       Depression Screening (PHQ):  PHQ-2 SCORE: 0  , done 3/1/2025            Advanced Directives:   She does NOT have a Living Will. [Do you have a living will?: No]  She does NOT have a Power of  for Health Care. [Do you have a healthcare power of ?: No]  Discussed Advance Care Planning with patient (and family/surrogate if present). Standard forms made available to patient in After Visit Summary.      Patient Active Problem List   Diagnosis    Mild persistent asthma without complication (HCC)    Major depressive disorder, single episode, moderate (HCC)    Gastroesophageal reflux disease without esophagitis    Acquired  hypothyroidism    History of stroke    Long term (current) use of opiate analgesic    Type 2 diabetes mellitus with microalbuminuria, without long-term current use of insulin (HCC)    At high risk for falls    Atherosclerosis of both carotid arteries    Hammer toes of both feet    Hyperlipidemia associated with type 2 diabetes mellitus (HCC)    Hypertension associated with diabetes (HCC)    Ekbom's delusional parasitosis (HCC)     Allergies:  She is allergic to iodine (topical), penicillin v, penicillins, radiology contrast iodinated dyes, sulfa antibiotics, and meloxicam.    Current Medications:  Outpatient Medications Marked as Taking for the 3/1/25 encounter (Office Visit) with Shannon Herring APRN   Medication Sig    gabapentin 300 MG Oral Cap Take 1 capsule (300 mg total) by mouth 3 (three) times daily.    HYDROcodone-acetaminophen 5-325 MG Oral Tab Take 1 tablet by mouth every 8 (eight) hours as needed for Pain.    LEVOTHYROXINE 112 MCG Oral Tab TAKE 1 TABLET(112 MCG) BY MOUTH BEFORE BREAKFAST    albuterol 108 (90 Base) MCG/ACT Inhalation Aero Soln Inhale 1 puff into the lungs every 6 (six) hours as needed for Wheezing.    valsartan 80 MG Oral Tab Take 1 tablet (80 mg total) by mouth daily.    Omeprazole 40 MG Oral Capsule Delayed Release Take 1 capsule (40 mg total) by mouth every morning.    ATORVASTATIN 40 MG Oral Tab TAKE 1 TABLET(40 MG) BY MOUTH EVERY EVENING    magnesium 30 MG Oral Tab Take 1 tablet (30 mg total) by mouth daily.    ofloxacin 0.3 % Otic Solution Take 5 drops twice a day    ketoconazole 2 % External Cream Apply 1 Application topically daily.    Ascorbic Acid (VITAMIN C OR) Take by mouth.    Glucosamine-Chondroit-Vit C-Mn (GLUCOSAMINE 1500 COMPLEX) Oral Cap Take 1,500 mg by mouth daily.    Cholecalciferol 125 MCG (5000 UT) Oral Tab Take 1 tablet (5,000 Units total) by mouth daily.    vitamin E 100 UNITS Oral Cap Take 1 capsule (100 Units total) by mouth daily.    acetaminophen 160 MG Oral  Chew Tab Chew 1 tablet (160 mg total) by mouth daily as needed for Pain.       Medical History:  She  has a past medical history of Asthma (HCC), Diabetes mellitus (HCC), GERD (gastroesophageal reflux disease), Hypothyroid, MRSA (methicillin resistant staph aureus) culture positive (07/2016), Osteoarthritis, Other and unspecified hyperlipidemia, Type II or unspecified type diabetes mellitus without mention of complication, not stated as uncontrolled, and Unspecified essential hypertension.  Surgical History:  She  has a past surgical history that includes cholecystectomy; lumpectomy left; other; appendectomy; other; other (January 2014); other surgical history (7/10/2014); other surgical history (9/2/2014); other surgical history (9/18/2014); other surgical history (Bilateral, 3/9/2015); other surgical history (Right, 9/3/2015); other surgical history (Left, 9/15/2015); other surgical history (Bilateral, 6/20/2016); hysterectomy (2005); and stab phlebectomy, varicose veins, 1 extremity; <20 incisions (Bilateral, 2000).   Family History:  Her family history is not on file.  Social History:  She  reports that she has never smoked. She has never used smokeless tobacco. She reports that she does not drink alcohol and does not use drugs.    Tobacco:  She has never smoked tobacco.    CAGE Alcohol Screen:   CAGE screening score of 0 on 3/1/2025, showing low risk of alcohol abuse.      Patient Care Team:  Facundo Choudhury MD as PCP - General (Internal Medicine)  Gayle Corral MD as Consulting Physician (Anesthesiology)  Franchesca Aviles APRN (Nurse Practitioner)  Gabby Mace PA-C (Physician Assistant)  Lolis Kraus APRN (Nurse Practitioner)  Anshu Siddiqui MD (Anesthesiology Pain Medicine)  Andrew Durant DO as Consulting Physician (NEUROLOGY)    Review of Systems  GENERAL: feels well otherwise  SKIN: denies any unusual skin lesions  EYES: denies blurred vision or double vision  HEENT: denies nasal congestion,  sinus pain or ST  LUNGS: denies shortness of breath with exertion  CARDIOVASCULAR: denies chest pain on exertion  GI: denies abdominal pain, denies heartburn  : denies dysuria, vaginal discharge or itching, no complaint of urinary incontinence   MUSCULOSKELETAL: denies back pain  NEURO: denies headaches  PSYCHE: denies depression or anxiety  HEMATOLOGIC: denies hx of anemia  ENDOCRINE: denies thyroid history  ALL/ASTHMA: denies hx of allergy or asthma    Objective:   Physical Exam  General Appearance:  Alert, cooperative, no distress, appears stated age   Head:  Normocephalic, without obvious abnormality, atraumatic   Eyes:  PERRL, conjunctiva/corneas clear, EOM's intact both eyes   Ears:  Normal TM's and external ear canals, both ears   Nose: Nares normal, septum midline,mucosa normal, no drainage or sinus tenderness   Throat: Lips, mucosa, and tongue normal; teeth and gums normal   Neck: Supple, symmetrical, trachea midline, no adenopathy;  thyroid: not enlarged, symmetric, no tenderness/mass/nodules; no carotid bruit or JVD   Back:   Symmetric, no curvature, ROM normal, no CVA tenderness   Lungs:   Clear to auscultation bilaterally, respirations unlabored   Heart:  Regular rate and rhythm, S1 and S2 normal, no murmur, rub, or gallop   Abdomen:   Soft, non-tender, bowel sounds active all four quadrants,  no masses, no organomegaly   Pelvic: Deferred   Extremities: Extremities normal, atraumatic, no cyanosis or edema   Pulses: 2+ and symmetric   Skin: Skin color, texture, turgor normal, no rashes or lesions   Lymph nodes: Cervical, supraclavicular, and axillary nodes normal   Neurologic: Normal   Bilateral barefoot skin diabetic exam is normal, visualized feet and the appearance is normal.  Bilateral monofilament/sensation of both feet is normal.  Pulsation pedal pulse exam of both lower legs/feet is normal as well.       /88   Pulse 71   Temp 97 °F (36.1 °C) (Temporal)   Resp 18   Ht 4' 11\" (1.499 m)    Wt 148 lb 6.4 oz (67.3 kg)   SpO2 97%   BMI 29.97 kg/m²  Estimated body mass index is 29.97 kg/m² as calculated from the following:    Height as of this encounter: 4' 11\" (1.499 m).    Weight as of this encounter: 148 lb 6.4 oz (67.3 kg).    Medicare Hearing Assessment:   Hearing Screening    Time taken: 3/1/2025 10:15 AM  Screening Method: Finger Rub  Finger Rub Result: Pass         Visual Acuity:   Right Eye Visual Acuity: Uncorrected Right Eye Chart Acuity: 20/70   Left Eye Visual Acuity: Uncorrected Left Eye Chart Acuity: 20/40   Both Eyes Visual Acuity: Uncorrected Both Eyes Chart Acuity: 20/50   Able To Tolerate Visual Acuity: Yes        Assessment & Plan:   Dana Iraheta is a 89 year old female who presents for a Medicare Assessment.     1. Medicare annual wellness visit, initial (Primary)  2. Ekbom's delusional parasitosis (HCC)  3. Hyperlipidemia associated with type 2 diabetes mellitus (HCC)  4. Hypertension associated with diabetes (HCC)  -     CBC With Differential With Platelet; Future; Expected date: 03/01/2025  -     Comp Metabolic Panel (14); Future; Expected date: 03/01/2025  5. Major depressive disorder, single episode, moderate (HCC)  6. Type 2 diabetes mellitus with microalbuminuria, without long-term current use of insulin (HCC)  -     Hemoglobin A1C; Future; Expected date: 03/01/2025  7. Atherosclerosis of both carotid arteries  -     Lipid Panel; Future; Expected date: 03/01/2025  8. Mild persistent asthma without complication ([facility])  9. Acquired hypothyroidism  -     TSH W Reflex To Free T4; Future; Expected date: 03/01/2025  10. History of stroke  Overview:  2007. Sees neuro Dr. Durant. Chronic infarct R occipital lobe on MRI.  11. Long term (current) use of opiate analgesic  12. Gastroesophageal reflux disease without esophagitis  13. Hammer toes of both feet  14. At high risk for falls  Other orders  -     TdaP (Adacel, Boostrix) [12013]       Mild persistent asthma without  complication- stable. Cont current inhaler therapy     Major depressive disorder, single episode, moderate (HCC)-controlled. Cont current med therapy     Gastroesophageal reflux disease without esophagitis-stable cont PPI     Acquired hypothyroidism-euthyroid. Cont replacement therapy     History of stroke- no residual.  continue control of cad risk factors     Long term (current) use of opiate analgesic- sees pain service     Type 2 diabetes mellitus with microalbuminuria, without long-term current use of insulin -controlled. Cont current med therapy. Cont ARB for renal protection. Schedule eye exam.     At high risk for falls- cont use of walker. Fall precaution educ given to pt     Atherosclerosis of both carotid arteries-stable. continue control of cad risk factors     Hyperlipidemia associated with type 2 diabetes mellitus -controlled. Cont current med therapy     Hypertension associated with diabetes -controlled. Cont current med therapy   Ekbom's delusional parasitosis- seems to be well controlled with Gabapentin    The patient indicates understanding of these issues and agrees to the plan.  Reinforced healthy diet, lifestyle, and exercise.      Return in about 6 months (around 9/1/2025) for med check.     SUZAN Ambriz, 2/28/2025     Supplementary Documentation:   General Health:  In the past six months, have you lost more than 10 pounds without trying?: 2 - No  Has your appetite been poor?: No  Type of Diet: Balanced  How does the patient maintain a good energy level?: Daily Walks  How would you describe your daily physical activity?: Light  How would you describe your current health state?: Fair  How do you maintain positive mental well-being?: Social Interaction;Visiting Family  On a scale of 0 to 10, with 0 being no pain and 10 being severe pain, what is your pain level?: 7 - (Severe)  In the past six months, have you experienced urine leakage?: 1-Yes  At any time do you feel concerned for the  safety/well-being of yourself and/or your children, in your home or elsewhere?: No  Have you had any immunizations at another office such as Influenza, Hepatitis B, Tetanus, or Pneumococcal?: No    Health Maintenance   Topic Date Due    Zoster Vaccines (1 of 2) Never done    Diabetes Care Dilated Eye Exam  03/22/2023    COVID-19 Vaccine (4 - 2024-25 season) 09/01/2024    Annual Well Visit  01/01/2025    Diabetes Care A1C  04/05/2025    Diabetes Care: GFR  10/05/2025    Influenza Vaccine  Completed    Annual Depression Screening  Completed    Fall Risk Screening (Annual)  Completed    Diabetes Care: Foot Exam (Annual)  Completed    Diabetes Care: Microalb/Creat Ratio (Annual)  Completed    Pneumococcal Vaccine: 50+ Years  Completed    Meningococcal B Vaccine  Aged Out

## 2025-03-01 ENCOUNTER — LAB ENCOUNTER (OUTPATIENT)
Dept: LAB | Age: 89
End: 2025-03-01
Payer: MEDICARE

## 2025-03-01 ENCOUNTER — OFFICE VISIT (OUTPATIENT)
Dept: INTERNAL MEDICINE CLINIC | Facility: CLINIC | Age: 89
End: 2025-03-01
Payer: MEDICARE

## 2025-03-01 VITALS
WEIGHT: 148.38 LBS | DIASTOLIC BLOOD PRESSURE: 88 MMHG | OXYGEN SATURATION: 97 % | BODY MASS INDEX: 29.91 KG/M2 | HEART RATE: 71 BPM | TEMPERATURE: 97 F | HEIGHT: 59 IN | SYSTOLIC BLOOD PRESSURE: 134 MMHG | RESPIRATION RATE: 18 BRPM

## 2025-03-01 DIAGNOSIS — E11.29 TYPE 2 DIABETES MELLITUS WITH MICROALBUMINURIA, WITHOUT LONG-TERM CURRENT USE OF INSULIN (HCC): ICD-10-CM

## 2025-03-01 DIAGNOSIS — E11.59 HYPERTENSION ASSOCIATED WITH DIABETES (HCC): ICD-10-CM

## 2025-03-01 DIAGNOSIS — I65.23 ATHEROSCLEROSIS OF BOTH CAROTID ARTERIES: ICD-10-CM

## 2025-03-01 DIAGNOSIS — I15.2 HYPERTENSION ASSOCIATED WITH DIABETES (HCC): ICD-10-CM

## 2025-03-01 DIAGNOSIS — E78.5 HYPERLIPIDEMIA ASSOCIATED WITH TYPE 2 DIABETES MELLITUS (HCC): ICD-10-CM

## 2025-03-01 DIAGNOSIS — Z91.81 AT HIGH RISK FOR FALLS: ICD-10-CM

## 2025-03-01 DIAGNOSIS — F32.1 MAJOR DEPRESSIVE DISORDER, SINGLE EPISODE, MODERATE (HCC): ICD-10-CM

## 2025-03-01 DIAGNOSIS — R80.9 TYPE 2 DIABETES MELLITUS WITH MICROALBUMINURIA, WITHOUT LONG-TERM CURRENT USE OF INSULIN (HCC): ICD-10-CM

## 2025-03-01 DIAGNOSIS — J45.30 MILD PERSISTENT ASTHMA WITHOUT COMPLICATION (HCC): ICD-10-CM

## 2025-03-01 DIAGNOSIS — M20.41 HAMMER TOES OF BOTH FEET: ICD-10-CM

## 2025-03-01 DIAGNOSIS — E03.9 ACQUIRED HYPOTHYROIDISM: ICD-10-CM

## 2025-03-01 DIAGNOSIS — Z00.00 MEDICARE ANNUAL WELLNESS VISIT, INITIAL: Primary | ICD-10-CM

## 2025-03-01 DIAGNOSIS — Z79.891 LONG TERM (CURRENT) USE OF OPIATE ANALGESIC: ICD-10-CM

## 2025-03-01 DIAGNOSIS — F22 EKBOM'S DELUSIONAL PARASITOSIS (HCC): ICD-10-CM

## 2025-03-01 DIAGNOSIS — M20.42 HAMMER TOES OF BOTH FEET: ICD-10-CM

## 2025-03-01 DIAGNOSIS — Z86.73 HISTORY OF STROKE: ICD-10-CM

## 2025-03-01 DIAGNOSIS — K21.9 GASTROESOPHAGEAL REFLUX DISEASE WITHOUT ESOPHAGITIS: ICD-10-CM

## 2025-03-01 DIAGNOSIS — E11.69 HYPERLIPIDEMIA ASSOCIATED WITH TYPE 2 DIABETES MELLITUS (HCC): ICD-10-CM

## 2025-03-01 LAB
ALBUMIN SERPL-MCNC: 4.5 G/DL (ref 3.2–4.8)
ALBUMIN/GLOB SERPL: 1.8 {RATIO} (ref 1–2)
ALP LIVER SERPL-CCNC: 74 U/L
ALT SERPL-CCNC: 16 U/L
ANION GAP SERPL CALC-SCNC: 4 MMOL/L (ref 0–18)
AST SERPL-CCNC: 20 U/L (ref ?–34)
BASOPHILS # BLD AUTO: 0.05 X10(3) UL (ref 0–0.2)
BASOPHILS NFR BLD AUTO: 1 %
BILIRUB SERPL-MCNC: 0.5 MG/DL (ref 0.2–1.1)
BUN BLD-MCNC: 14 MG/DL (ref 9–23)
CALCIUM BLD-MCNC: 9.7 MG/DL (ref 8.7–10.6)
CHLORIDE SERPL-SCNC: 105 MMOL/L (ref 98–112)
CHOLEST SERPL-MCNC: 111 MG/DL (ref ?–200)
CO2 SERPL-SCNC: 29 MMOL/L (ref 21–32)
CREAT BLD-MCNC: 0.7 MG/DL
EGFRCR SERPLBLD CKD-EPI 2021: 84 ML/MIN/1.73M2 (ref 60–?)
EOSINOPHIL # BLD AUTO: 0.15 X10(3) UL (ref 0–0.7)
EOSINOPHIL NFR BLD AUTO: 2.9 %
ERYTHROCYTE [DISTWIDTH] IN BLOOD BY AUTOMATED COUNT: 12.5 %
EST. AVERAGE GLUCOSE BLD GHB EST-MCNC: 146 MG/DL (ref 68–126)
FASTING PATIENT LIPID ANSWER: YES
FASTING STATUS PATIENT QL REPORTED: YES
GLOBULIN PLAS-MCNC: 2.5 G/DL (ref 2–3.5)
GLUCOSE BLD-MCNC: 103 MG/DL (ref 70–99)
HBA1C MFR BLD: 6.7 % (ref ?–5.7)
HCT VFR BLD AUTO: 39.3 %
HDLC SERPL-MCNC: 58 MG/DL (ref 40–59)
HGB BLD-MCNC: 13 G/DL
IMM GRANULOCYTES # BLD AUTO: 0.01 X10(3) UL (ref 0–1)
IMM GRANULOCYTES NFR BLD: 0.2 %
LDLC SERPL CALC-MCNC: 34 MG/DL (ref ?–100)
LYMPHOCYTES # BLD AUTO: 1.43 X10(3) UL (ref 1–4)
LYMPHOCYTES NFR BLD AUTO: 27.9 %
MCH RBC QN AUTO: 29.5 PG (ref 26–34)
MCHC RBC AUTO-ENTMCNC: 33.1 G/DL (ref 31–37)
MCV RBC AUTO: 89.1 FL
MONOCYTES # BLD AUTO: 0.43 X10(3) UL (ref 0.1–1)
MONOCYTES NFR BLD AUTO: 8.4 %
NEUTROPHILS # BLD AUTO: 3.05 X10 (3) UL (ref 1.5–7.7)
NEUTROPHILS # BLD AUTO: 3.05 X10(3) UL (ref 1.5–7.7)
NEUTROPHILS NFR BLD AUTO: 59.6 %
NONHDLC SERPL-MCNC: 53 MG/DL (ref ?–130)
OSMOLALITY SERPL CALC.SUM OF ELEC: 287 MOSM/KG (ref 275–295)
PLATELET # BLD AUTO: 191 10(3)UL (ref 150–450)
POTASSIUM SERPL-SCNC: 4.2 MMOL/L (ref 3.5–5.1)
PROT SERPL-MCNC: 7 G/DL (ref 5.7–8.2)
RBC # BLD AUTO: 4.41 X10(6)UL
SODIUM SERPL-SCNC: 138 MMOL/L (ref 136–145)
T3FREE SERPL-MCNC: 2.81 PG/ML (ref 2.4–4.2)
T4 FREE SERPL-MCNC: 1.4 NG/DL (ref 0.8–1.7)
TRIGL SERPL-MCNC: 106 MG/DL (ref 30–149)
TSI SER-ACNC: 0.54 UIU/ML (ref 0.55–4.78)
VLDLC SERPL CALC-MCNC: 14 MG/DL (ref 0–30)
WBC # BLD AUTO: 5.1 X10(3) UL (ref 4–11)

## 2025-03-01 PROCEDURE — 80053 COMPREHEN METABOLIC PANEL: CPT

## 2025-03-01 PROCEDURE — 84481 FREE ASSAY (FT-3): CPT

## 2025-03-01 PROCEDURE — 36415 COLL VENOUS BLD VENIPUNCTURE: CPT

## 2025-03-01 PROCEDURE — 85025 COMPLETE CBC W/AUTO DIFF WBC: CPT

## 2025-03-01 PROCEDURE — 80061 LIPID PANEL: CPT

## 2025-03-01 PROCEDURE — 83036 HEMOGLOBIN GLYCOSYLATED A1C: CPT

## 2025-03-01 PROCEDURE — 84439 ASSAY OF FREE THYROXINE: CPT

## 2025-03-01 PROCEDURE — 84443 ASSAY THYROID STIM HORMONE: CPT

## 2025-03-03 DIAGNOSIS — M43.07 LUMBOSACRAL SPONDYLOLYSIS: ICD-10-CM

## 2025-03-03 DIAGNOSIS — M54.16 LUMBAR RADICULOPATHY: ICD-10-CM

## 2025-03-03 DIAGNOSIS — Z79.891 CHRONIC PRESCRIPTION OPIATE USE: ICD-10-CM

## 2025-03-03 DIAGNOSIS — Z79.891 LONG TERM (CURRENT) USE OF OPIATE ANALGESIC: ICD-10-CM

## 2025-03-03 DIAGNOSIS — M47.819 ARTHROPATHY OF FACET JOINT: ICD-10-CM

## 2025-03-03 DIAGNOSIS — F11.90 CHRONIC NARCOTIC USE: ICD-10-CM

## 2025-03-03 DIAGNOSIS — M47.816 LUMBAR SPONDYLOSIS: ICD-10-CM

## 2025-03-03 DIAGNOSIS — M51.369 DDD (DEGENERATIVE DISC DISEASE), LUMBAR: ICD-10-CM

## 2025-03-03 RX ORDER — GABAPENTIN 300 MG/1
300 CAPSULE ORAL 3 TIMES DAILY
Qty: 90 CAPSULE | Refills: 0 | Status: CANCELLED | OUTPATIENT
Start: 2025-03-03

## 2025-03-03 NOTE — TELEPHONE ENCOUNTER
Patient calling to request refill of gabapentin 300 MG Oral Cap AND HYDROcodone-acetaminophen 5-325 MG Oral Tab   Pharmacy [id number] [place] [address] [address] [facility] [address], [phone], [phone]     Patient informed of 48 hour refill policy excluding weekends and holidays.   Informed patient prescription is sent directly to pharmacy.    Further explained patient will not receive a call back once prescription is ready.

## 2025-03-04 NOTE — TELEPHONE ENCOUNTER
Pt is due for follow-up with Loida in mid-march, called daughter to schedule. No answer, left message to call back.

## 2025-03-04 NOTE — TELEPHONE ENCOUNTER
Medication: gabapentin 300 MG Oral Cap     Date of last refill: 2/3/25      Medication: HYDROcodone-acetaminophen 5-325 MG Oral Tab     Date of last refill: 2/3/25  [place] 2[place]3/25    Last office visit: 24  Due back to clinic per last office note:  3 months  Date next office visit scheduled:  none    Last URINE Screen: 24  Screen Results:    Loida Bustamante, APRN  2024  8:00 AM CDT Back to Top      Results as expected based on medication prescribed         Narcotic Contract EXPIRATION date: 25    Last OV note recommendation: Plan:   > Recommend medial branch blocks at bilateral L4-5 L5-S1 level staging towards radiofrequency discussed with daughter and patient today: Will discuss with team since prior authorization  in November however we may be able to utilize that referral to move forward with medial branch blocks  > Follow-up 2 weeks after medial branch block  > Continue gabapentin 300 mg 3 times daily and hydrocodone 5/325 3 times daily as needed for pain: Call for refills  > Encouraged patient to follow-up with her primary care doctor regarding use of chronic NSAIDs, bladder complaints and abdominal hernia complaints.  > Follow-up in 3 months for medication follow-up and management

## 2025-03-06 DIAGNOSIS — M54.16 LUMBAR RADICULOPATHY: ICD-10-CM

## 2025-03-06 DIAGNOSIS — M47.819 ARTHROPATHY OF FACET JOINT: ICD-10-CM

## 2025-03-06 DIAGNOSIS — M43.07 LUMBOSACRAL SPONDYLOLYSIS: ICD-10-CM

## 2025-03-06 RX ORDER — HYDROCODONE BITARTRATE AND ACETAMINOPHEN 5; 325 MG/1; MG/1
1 TABLET ORAL EVERY 8 HOURS PRN
Qty: 90 TABLET | Refills: 0 | Status: SHIPPED | OUTPATIENT
Start: 2025-03-06 | End: 2025-04-05

## 2025-03-06 RX ORDER — GABAPENTIN 300 MG/1
300 CAPSULE ORAL 3 TIMES DAILY
Qty: 90 CAPSULE | Refills: 0 | Status: SHIPPED | OUTPATIENT
Start: 2025-03-06

## 2025-03-06 NOTE — TELEPHONE ENCOUNTER
Norco and Gabapentin sent to pharmacy:  E-Prescribing Status: Receipt confirmed by pharmacy (3/6/2025 12:48 PM CST)   E-Prescribing Status: Receipt confirmed by pharmacy (3/6/2025 10:25 AM CST)

## 2025-03-06 NOTE — TELEPHONE ENCOUNTER
Medication: gabapentin 300 MG Oral Cap     Date of last refill: 2/3/25    Last office visit: 12/17/24  Due back to clinic per last office note:  3 months  Date next office visit scheduled:  3/6/25 @ 1330 with Pepe      Last OV note recommendation:   Medical Decision Making:   Diagnosis:         Encounter Diagnoses   Name Primary?    Lumbosacral spondylolysis Yes    Arthropathy of facet joint      Long term (current) use of opiate analgesic      Lumbar spondylosis      Chronic prescription opiate use                 Impression:   Patient is an 88-year-old female with past medical history of MDD, type 2 diabetes with microalbumin being urea, hyperlipidemia, hypertension, delusional.  Cytosis, carotid atherosclerosis, hypothyroidism, history of stroke, at risk for falls, with chronic back pain bilateral radicular symptoms.  Patient uses hydrocodone 5/325 3 times daily as needed along with gabapentin 300 mg 3 times daily.  Patient has longstanding history of chronic back pain and feels that the medications help manage her symptoms.     Patient is reporting her most significant pain is in her low back across the back.  She uses heat and stretching at home.  Patient did not feel that the lumbar epidural steroid injection helped her back symptoms.  She did have facet injections in 2022 but cannot recall if those were helpful.  She was reminded that medial branch blocks were recommended back in September 2024 however her and her daughter were unable to coordinate time to have these injections.     During the visit I did request that her daughter come in the office to discuss the medial branch blocks.  Risks and benefits of these blocks were discussed with patient and daughter and they do wish to proceed.  They were informed that these blocks are diagnostic in terms of relief we are wanting to know if patient obtains relief during the diagnostic phase of the local anesthetic so that we can proceed to the radiofrequency.  I  informed him I will send through [facility] a pain diary for them to keep track the day of the procedure     They were also asked to follow-up with the primary care provider to see if patient can be on long-term NSAIDs/prescription strength due to history of GERD and ulcer being on omeprazole.  Patient denies having any bleeding in stool.  They will discuss with the primary care provider        Current VAS Score: 7  Functional Deficits: Walking lifting bending back household chores  Duration of pain symptoms: Greater than 2 years  Specific Levels (Only 2 allowed): L4-5 L5-S1  Initial treatment or subsequent? (Per area of the body): Had facet injections in   Radiculopathy & neurogenic claudication ruled out?:  Not applicable  Pre-procedure assessment completed on: Today  Post-procedure assessment completed on: Will be done 2 weeks post procedure  % of pain relief from previous procedure/s: Could not recall if facet injections helped in   VAS Score during diagnostic phase: Not applicable  Duration of relief from previous procedure/s: Not applicable  Prior Conservative Treatment: Medications and physical therapy failed in past      She is not an NSAID candidate due to CKD.  Patient will update her opioid agreement today and urine drug screen.     Total visit time: 30 minutes  More than 50% spent coordinating care, providing patient education, reviewing imaging, discussing conservative vs surgical treatment options and counseling.        Plan:   > Recommend medial branch blocks at bilateral L4-5 L5-S1 level staging towards radiofrequency discussed with daughter and patient today: Will discuss with team since prior authorization  in November however we may be able to utilize that referral to move forward with medial branch blocks  > Follow-up 2 weeks after medial branch block  > Continue gabapentin 300 mg 3 times daily and hydrocodone 5/325 3 times daily as needed for pain: Call for refills  > Encouraged  patient to follow-up with her primary care doctor regarding use of chronic NSAIDs, bladder complaints and abdominal hernia complaints.  > Follow-up in 3 months for medication follow-up and management  No orders of the defined types were placed in this encounter.

## 2025-03-10 ENCOUNTER — OFFICE VISIT (OUTPATIENT)
Dept: PAIN CLINIC | Facility: CLINIC | Age: 89
End: 2025-03-10
Payer: MEDICARE

## 2025-03-10 ENCOUNTER — LAB ENCOUNTER (OUTPATIENT)
Dept: LAB | Age: 89
End: 2025-03-10
Attending: NURSE PRACTITIONER
Payer: MEDICARE

## 2025-03-10 VITALS — SYSTOLIC BLOOD PRESSURE: 114 MMHG | DIASTOLIC BLOOD PRESSURE: 66 MMHG | HEART RATE: 64 BPM | OXYGEN SATURATION: 96 %

## 2025-03-10 DIAGNOSIS — Z79.891 LONG TERM (CURRENT) USE OF OPIATE ANALGESIC: ICD-10-CM

## 2025-03-10 DIAGNOSIS — M54.16 LUMBAR RADICULOPATHY: Primary | ICD-10-CM

## 2025-03-10 DIAGNOSIS — F11.90 CHRONIC NARCOTIC USE: ICD-10-CM

## 2025-03-10 DIAGNOSIS — Z79.891 CHRONIC PRESCRIPTION OPIATE USE: ICD-10-CM

## 2025-03-10 DIAGNOSIS — M43.07 LUMBOSACRAL SPONDYLOLYSIS: ICD-10-CM

## 2025-03-10 DIAGNOSIS — M54.16 LUMBAR RADICULOPATHY: ICD-10-CM

## 2025-03-10 PROCEDURE — 80307 DRUG TEST PRSMV CHEM ANLYZR: CPT

## 2025-03-10 NOTE — PROGRESS NOTES
Patient presents in office today with reported pain in back and legs    Current pain level reported = 7/10    Last reported dose of Norco: 30 mins ago, gabapentin: 6 am this morning      Narcotic Contract renewal 9/24/24    Urine Drug screen 3/10/25
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
## 2025-03-10 NOTE — PATIENT INSTRUCTIONS

## 2025-03-31 DIAGNOSIS — E03.9 ACQUIRED HYPOTHYROIDISM: ICD-10-CM

## 2025-03-31 RX ORDER — LEVOTHYROXINE SODIUM 100 UG/1
100 TABLET ORAL
Qty: 30 TABLET | Refills: 1 | Status: SHIPPED | OUTPATIENT
Start: 2025-03-31

## 2025-04-01 NOTE — TELEPHONE ENCOUNTER
Last time medication was refilled: 3/3/25  Next office visit due/scheduled: 9/6/25  Last office visit: 3/1/25  Last Labs: 3/1/25

## 2025-04-04 DIAGNOSIS — Z79.891 LONG TERM (CURRENT) USE OF OPIATE ANALGESIC: ICD-10-CM

## 2025-04-04 DIAGNOSIS — M43.07 LUMBOSACRAL SPONDYLOLYSIS: ICD-10-CM

## 2025-04-04 DIAGNOSIS — M54.16 LUMBAR RADICULOPATHY: ICD-10-CM

## 2025-04-04 DIAGNOSIS — F11.90 CHRONIC NARCOTIC USE: ICD-10-CM

## 2025-04-04 DIAGNOSIS — M47.816 LUMBAR SPONDYLOSIS: ICD-10-CM

## 2025-04-04 DIAGNOSIS — M51.369 DDD (DEGENERATIVE DISC DISEASE), LUMBAR: ICD-10-CM

## 2025-04-04 DIAGNOSIS — M47.819 ARTHROPATHY OF FACET JOINT: ICD-10-CM

## 2025-04-04 DIAGNOSIS — Z79.891 CHRONIC PRESCRIPTION OPIATE USE: ICD-10-CM

## 2025-04-04 RX ORDER — HYDROCODONE BITARTRATE AND ACETAMINOPHEN 5; 325 MG/1; MG/1
1 TABLET ORAL EVERY 8 HOURS PRN
Qty: 90 TABLET | Refills: 0 | Status: SHIPPED | OUTPATIENT
Start: 2025-04-05 | End: 2025-05-06

## 2025-04-04 RX ORDER — GABAPENTIN 300 MG/1
300 CAPSULE ORAL 3 TIMES DAILY
Qty: 90 CAPSULE | Refills: 0 | Status: SHIPPED | OUTPATIENT
Start: 2025-04-05 | End: 2025-05-08

## 2025-04-04 NOTE — TELEPHONE ENCOUNTER
Medication: HYDROcodone-acetaminophen 5-325 MG Oral Tab   Date of last refill: 3/6/25  [place] 3[place]6/25    Medication: GABAPENTIN 300 MG Oral Cap   Date of last refill: 3/6/25  [place] 3[place]6/25    Last office visit: 3/10/25  Due back to clinic per last office note:  3 months  Date next office visit scheduled:  6/9/25    Last URINE Screen: 3/14/25  Screen Results:    Loida Bustamante, APRN  3/17/2025  7:48 AM CDT Back to Top      As expected         Narcotic Contract EXPIRATION date: 3/10/26    Last OV note recommendation:   Medical Decision Making:   Diagnosis:         Encounter Diagnoses   Name Primary?    Lumbar radiculopathy Yes    Chronic narcotic use      Lumbosacral spondylolysis      Long term (current) use of opiate analgesic      Chronic prescription opiate use                 Impression:   Patient is an 88-year-old female with past medical history of MDD, type 2 diabetes with microalbumin being urea, hyperlipidemia, hypertension, delusional.  Cytosis, carotid atherosclerosis, hypothyroidism, history of stroke, at risk for falls, with chronic back pain bilateral radicular symptoms.  Patient uses hydrocodone 5/325 3 times daily as needed along with gabapentin 300 mg 3 times daily.  Patient has longstanding history of chronic back pain and feels that the medications help manage her symptoms.        Patient is reporting her most significant pain is in her low back across the back and down her legs left greater than right..  She uses heat and stretching at home.  Patient did not feel that the lumbar epidural steroid injection helped her back symptoms.  She did have facet injections in 2022 but cannot recall if those were helpful.  She is not interested in injection therapy.  She would like to just continue her medications to help with her pain symptoms.          Prior Conservative Treatment: Medications and physical therapy failed in past       She is not an NSAID candidate due to CKD.  Patient will update her  urine drug screen.     Total visit time: 30 minutes  More than 50% spent coordinating care, providing patient education, reviewing imaging, discussing conservative vs surgical treatment options and counseling.        Plan:   > No further injections at this time: Patient has had medial branch blocks and lumbar epidural steroid injections without significant relief  > Continue gabapentin 300 mg 3 times daily and hydrocodone 5/325 3 times daily as needed for pain: Call for refills  > Encouraged patient to follow-up with her primary care doctor regarding use of chronic NSAIDs, bladder complaints and abdominal hernia complaints.  > Follow-up in 3 months for medication follow-up and management  > UDS needs to be completed/ordered today patient aware      Orders Placed This Encounter   Procedures    Pain Management Drug Panel, Urine

## 2025-04-04 NOTE — TELEPHONE ENCOUNTER
Patient calling, requesting refill on:    Gabapentin   AND  Hydrocodone     Requesting medications to be filled at:    [id number] - [place], [place] - [address] AT [facility] [address], [phone], [phone]

## 2025-04-08 DIAGNOSIS — R80.9 TYPE 2 DIABETES MELLITUS WITH MICROALBUMINURIA, WITHOUT LONG-TERM CURRENT USE OF INSULIN (HCC): ICD-10-CM

## 2025-04-08 DIAGNOSIS — E78.2 MIXED HYPERLIPIDEMIA: ICD-10-CM

## 2025-04-08 DIAGNOSIS — E11.29 TYPE 2 DIABETES MELLITUS WITH MICROALBUMINURIA, WITHOUT LONG-TERM CURRENT USE OF INSULIN (HCC): ICD-10-CM

## 2025-04-08 DIAGNOSIS — K21.9 GASTROESOPHAGEAL REFLUX DISEASE WITHOUT ESOPHAGITIS: ICD-10-CM

## 2025-04-08 DIAGNOSIS — I10 ESSENTIAL HYPERTENSION: ICD-10-CM

## 2025-04-08 RX ORDER — OMEPRAZOLE 40 MG/1
40 CAPSULE, DELAYED RELEASE ORAL EVERY MORNING
Qty: 90 CAPSULE | Refills: 0 | Status: SHIPPED | OUTPATIENT
Start: 2025-04-08

## 2025-04-08 RX ORDER — VALSARTAN 80 MG/1
80 TABLET ORAL DAILY
Qty: 90 TABLET | Refills: 0 | Status: SHIPPED | OUTPATIENT
Start: 2025-04-08

## 2025-04-08 RX ORDER — ATORVASTATIN CALCIUM 40 MG/1
40 TABLET, FILM COATED ORAL EVERY EVENING
Qty: 90 TABLET | Refills: 0 | Status: SHIPPED | OUTPATIENT
Start: 2025-04-08

## 2025-04-08 NOTE — TELEPHONE ENCOUNTER
Last time medication was refilled 12/29/2024  Last office visit  03/01/2025  Next office visit due/scheduled   Future Appointments   Date Time Provider Department Center   4/9/2025  9:00 AM Paige Cottrell PA-C [facility]   4/22/2025  8:30 AM Ayesha Gay DO [facility] None   6/9/2025  8:15 AM Anshu Siddiqui MD [facility]   9/6/2025  9:30 AM Shannon Herring APRN [facility]     Medication passed protocol, refill sent.

## 2025-04-08 NOTE — TELEPHONE ENCOUNTER
ATORVASTATIN 40 MG Oral Tab   Last time medication was refilled 12/28/2024  Last office visit  03/01/2025  Next office visit due/scheduled   Future Appointments   Date Time Provider Department Center   4/9/2025  9:00 AM Paige Cottrell PA-C [facility]   4/22/2025  8:30 AM Ayesha Gay DO [facility] None   6/9/2025  8:15 AM Anshu Siddiqui MD [facility]   9/6/2025  9:30 AM Shannon Herring APRN [facility]     Medication passed protocol, refill sent.    Omeprazole 40 MG Oral Capsule Delayed Release   Last time medication was refilled 12/28/2024  Last office visit  03/01/2025  Next office visit due/scheduled   Future Appointments   Date Time Provider Department Center   4/9/2025  9:00 AM Paige Cottrell PA-C [facility]   4/22/2025  8:30 AM Ayesha Gay DO [facility] None   6/9/2025  8:15 AM Anshu Siddiqui MD [facility]   9/6/2025  9:30 AM Shannon Herring [facility]     Medication not on protocol.

## 2025-04-09 ENCOUNTER — TELEMEDICINE (OUTPATIENT)
Dept: INTERNAL MEDICINE CLINIC | Facility: CLINIC | Age: 89
End: 2025-04-09
Payer: MEDICARE

## 2025-04-09 DIAGNOSIS — J45.30 MILD PERSISTENT ASTHMA WITHOUT COMPLICATION (HCC): Primary | ICD-10-CM

## 2025-04-09 RX ORDER — MONTELUKAST SODIUM 10 MG/1
10 TABLET ORAL NIGHTLY
Qty: 90 TABLET | Refills: 1 | Status: SHIPPED | OUTPATIENT
Start: 2025-04-09

## 2025-04-09 NOTE — PROGRESS NOTES
Dana Iraheta is a 89 year old female.  HPI:   This visit is conducted using Telemedicine with live, interactive video and audio.     Patient consents to video visit today to discuss f/up on asthma  Has been using albuterol inhaler around twice a day, would like to restart montelukast. Thinks it's flared up dt recent allergy season. Has tolerated well in the past.    Current Medications[1]   Past Medical History[2]   Social History:  Short Social Hx on File[3]     REVIEW OF SYSTEMS:   GENERAL HEALTH: feels well otherwise. Denies fever, chills, unintentional weight change  SKIN: denies any unusual skin lesions or rashes  RESPIRATORY: denies hemoptysis, shortness of breath with exertion, wheezing or cough  CARDIOVASCULAR: denies chest pain or palpitations, denies leg swelling  GI: denies abdominal pain and denies heartburn. Denies nausea, vomiting, diarrhea, constipation  NEURO: denies headaches, dizziness, weakness, syncope    EXAM:   No vitals for video visit  Physical Exam  - well appearing via video visit  - no visible rash or diaphoresis  - no respiratory distress or cough observed  - able to speak in full sentences  - alert and oriented        ASSESSMENT AND PLAN:   1. Mild persistent asthma without complication (HCC) (Primary)  -     Montelukast Sodium; Take 1 tablet (10 mg total) by mouth nightly.  Dispense: 90 tablet; Refill: 1   -continue prn albuterol, restart singulair daily. Monitor; discussed adding add'l inhaler such as asmanex if still depends on daily albuterol, will update us on asthma control in a few weeks.     The patient indicates understanding of these issues and agrees to the plan.  No follow-ups on file.        [1]   Current Outpatient Medications   Medication Sig Dispense Refill    montelukast 10 MG Oral Tab Take 1 tablet (10 mg total) by mouth nightly. 90 tablet 1    atorvastatin 40 MG Oral Tab Take 1 tablet (40 mg total) by mouth every evening. 90 tablet 0    HYDROcodone-acetaminophen  5-325 MG Oral Tab Take 1 tablet by mouth every 8 (eight) hours as needed for Pain. 90 tablet 0    gabapentin 300 MG Oral Cap Take 1 capsule (300 mg total) by mouth 3 (three) times daily. 90 capsule 0    valsartan 80 MG Oral Tab Take 1 tablet (80 mg total) by mouth daily. 90 tablet 0    Omeprazole 40 MG Oral Capsule Delayed Release Take 1 capsule (40 mg total) by mouth every morning. 90 capsule 0    levothyroxine 100 MCG Oral Tab Take 1 tablet (100 mcg total) by mouth before breakfast. 30 tablet 1    albuterol 108 (90 Base) MCG/ACT Inhalation Aero Soln Inhale 1 puff into the lungs every 6 (six) hours as needed for Wheezing. 8 g 0    magnesium 30 MG Oral Tab Take 1 tablet (30 mg total) by mouth daily.      ketoconazole 2 % External Cream Apply 1 Application topically daily. 15 g 0    Ascorbic Acid (VITAMIN C OR) Take by mouth.      Glucosamine-Chondroit-Vit C-Mn (GLUCOSAMINE 1500 COMPLEX) Oral Cap Take 1,500 mg by mouth daily.      Cholecalciferol 125 MCG (5000 UT) Oral Tab Take 1 tablet (5,000 Units total) by mouth daily.      vitamin E 100 UNITS Oral Cap Take 1 capsule (100 Units total) by mouth daily.      acetaminophen 160 MG Oral Chew Tab Chew 1 tablet (160 mg total) by mouth daily as needed for Pain.     [2]   Past Medical History:   Asthma (HCC)    Diabetes mellitus (HCC)    GERD (gastroesophageal reflux disease)    Hypothyroid    MRSA (methicillin resistant staph aureus) culture positive    Infection to left hand/arm    Osteoarthritis    Other and unspecified hyperlipidemia    Type II or unspecified type diabetes mellitus without mention of complication, not stated as uncontrolled    Unspecified essential hypertension   [3]   Social History  Socioeconomic History    Marital status:    Tobacco Use    Smoking status: Never    Smokeless tobacco: Never   Vaping Use    Vaping status: Never Used   Substance and Sexual Activity    Alcohol use: No     Alcohol/week: 0.0 standard drinks of alcohol    Drug use:  No   Other Topics Concern    Caffeine Concern Yes     Comment: 1-2 cups daily    Exercise No     Comment: rare     Social Drivers of Health      Received from [facility]    Housing Stability

## 2025-04-10 NOTE — TELEPHONE ENCOUNTER
Detail Level: Detailed Patient calling to request refill of gabapentin 300 MG Oral Cap AND HYDROcodone-acetaminophen 5-325 MG Oral Tab   Pharmacy [id number] [place] [address] [address] [facility] [address], [phone], [phone]     Patient informed of 48 hour refill policy excluding weekends and holidays.  Informed patient prescription is sent directly to pharmacy.    Further explained patient will not receive a call back once prescription is ready.

## 2025-04-15 ENCOUNTER — TELEPHONE (OUTPATIENT)
Dept: UROLOGY | Facility: CLINIC | Age: 89
End: 2025-04-15

## 2025-04-16 ENCOUNTER — TELEPHONE (OUTPATIENT)
Dept: UROLOGY | Facility: CLINIC | Age: 89
End: 2025-04-16

## 2025-04-22 ENCOUNTER — TELEPHONE (OUTPATIENT)
Dept: ADMINISTRATIVE | Age: 89
End: 2025-04-22

## 2025-04-22 ENCOUNTER — OFFICE VISIT (OUTPATIENT)
Dept: UROLOGY | Facility: CLINIC | Age: 89
End: 2025-04-22
Attending: OBSTETRICS & GYNECOLOGY
Payer: MEDICARE

## 2025-04-22 VITALS — TEMPERATURE: 98 F | BODY MASS INDEX: 30.68 KG/M2 | WEIGHT: 152.19 LBS | HEIGHT: 59 IN

## 2025-04-22 DIAGNOSIS — R35.1 NOCTURIA: ICD-10-CM

## 2025-04-22 DIAGNOSIS — N39.41 URGE INCONTINENCE: ICD-10-CM

## 2025-04-22 DIAGNOSIS — N81.84 PELVIC MUSCLE WASTING: ICD-10-CM

## 2025-04-22 DIAGNOSIS — N39.3 FEMALE STRESS INCONTINENCE: ICD-10-CM

## 2025-04-22 DIAGNOSIS — N81.11 CYSTOCELE, MIDLINE: Primary | ICD-10-CM

## 2025-04-22 DIAGNOSIS — N95.2 POSTMENOPAUSAL ATROPHIC VAGINITIS: ICD-10-CM

## 2025-04-22 DIAGNOSIS — Z09 FOLLOW-UP EXAM AFTER TREATMENT: ICD-10-CM

## 2025-04-22 DIAGNOSIS — R32 URINARY INCONTINENCE, UNSPECIFIED TYPE: Primary | ICD-10-CM

## 2025-04-22 LAB
BILIRUB UR QL STRIP.AUTO: NEGATIVE
CLARITY UR REFRACT.AUTO: CLEAR
CONTROL RUN WITHIN 24 HOURS?: YES
GLUCOSE UR STRIP.AUTO-MCNC: NORMAL MG/DL
KETONES UR STRIP.AUTO-MCNC: NEGATIVE MG/DL
LEUKOCYTE ESTERASE UR QL STRIP.AUTO: NEGATIVE
LEUKOCYTE ESTERASE URINE: NEGATIVE
NITRITE UR QL STRIP.AUTO: NEGATIVE
NITRITE URINE: NEGATIVE
PH UR STRIP.AUTO: 5 [PH] (ref 5–8)
PROT UR STRIP.AUTO-MCNC: NEGATIVE MG/DL
RBC UR QL AUTO: NEGATIVE
SP GR UR STRIP.AUTO: 1.02 (ref 1–1.03)
UROBILINOGEN UR STRIP.AUTO-MCNC: NORMAL MG/DL

## 2025-04-22 PROCEDURE — 99212 OFFICE O/P EST SF 10 MIN: CPT

## 2025-04-22 PROCEDURE — 87086 URINE CULTURE/COLONY COUNT: CPT | Performed by: OBSTETRICS & GYNECOLOGY

## 2025-04-22 PROCEDURE — 81002 URINALYSIS NONAUTO W/O SCOPE: CPT | Performed by: OBSTETRICS & GYNECOLOGY

## 2025-04-22 PROCEDURE — 81003 URINALYSIS AUTO W/O SCOPE: CPT | Performed by: OBSTETRICS & GYNECOLOGY

## 2025-04-22 RX ORDER — ESTRADIOL 0.1 MG/G
CREAM VAGINAL
Qty: 42 G | Refills: 3 | Status: SHIPPED | OUTPATIENT
Start: 2025-04-22

## 2025-04-22 NOTE — TELEPHONE ENCOUNTER
Patient request  Referral  to see  Urogynecology(Ann)please review and sign plan and care if you agree Thank you.                            Salena SILVERMAN           [facility].

## 2025-04-22 NOTE — PROGRESS NOTES
Ayesha Gay, DO  2025     Referred by Shannon Herring  Pt here with daughter    Chief Complaint   Patient presents with    Urinary Symptoms     Referred by SUZAN Ambriz for ALANIS, UUI, pelvic discomfort with sensation to urinate, prolapse       HPI:  +ALANIS  +UUI  Nocturia x4-6  +prolapse sx  Hyst in  for birth control  Not sexually active, n/a dyspareunia  Constipated bowels    PRIOR TREATMENTS:    Surgery:  bladder surgeries x3 per pt  Last  with Dr Newman    no UTIs  No gross hematuria    , vdx3    Biggest bother is UUI    Vitals:  Temp 98.2 °F (36.8 °C)   Ht 59\"   Wt 152 lb 3.2 oz (69 kg)   BMI 30.74 kg/m²      HISTORY:  Past Medical History[1]   Past Surgical History[2]   Family History[3]   Short Social Hx on File[4]     Allergies:  Allergies[5]    Medications:  Medications Prior to Visit[6]    Urogynecology Summary:  Urogynecology Summary  Prolapse: Yes  ALANIS: Yes  Urge Incontinence: Yes  Nocturia Frequency: 2 (2-3)  Frequency: Greater than 3 hours (4-6)  Incomplete emptying: No  Constipation: Yes  Wears pad day?: 3 (3-5 heavy)  Wears Pad Night?: 3 (3-5 heavy)  Activities are limited by UI/POP?: No  Currently Sexually Active: No    Review of Systems:    A comprehensive 12 point review of systems was completed.  Pertinent positives noted in the the HPI.  No CP  No SOB    GENERAL EXAM:  GENERAL:  Alert and Oriented, and NAD  HEENT:  Normal, no lesions  LUNGS:  Normal effort  HEART:  RRR  ABDOMEN: soft, no mass, no hernia  EXTREM:  Normal, no edema  SKIN:  Normal, no lesions    PELVIC EXAM:  Ext. Gen: +atrophy, no lesions  Urethra: +atrophy, nontender  Bladder:+fullness, nontender  Vagina: +atrophy  Cervix & Uterus: absent  Adnexa:no masses, nontender  Perineum: nontender  Anus: wnl  Rectum: defer    PELVIS FLOOR NEUROMUSCULAR FUNCTION:  Strength:  1 and Unable to hold greater than 3 sec  Perineal Sensation:  Normal      PELVIC SUPPORT:  [place]:  0  Ant:  3  Post:  2  CST:  negative  UVJ:  +hypermobile    Pt examined with chaperone, RN    Verbal consent obtained  Sterile technique used to empty bladder, specimen sent    Impression/Plan:    ICD-10-CM    1. Cystocele, midline  N81.11       2. Pelvic muscle wasting  N81.84       3. Postmenopausal atrophic vaginitis  N95.2 estradiol (ESTRACE) 0.1 MG/GM Vaginal Cream      4. Nocturia  R35.1       5. Urge incontinence  N39.41       6. Female stress incontinence  N39.3           Discussion Items:   Urodynamics and cystoscopy for evaluation of LUTS  Behavioral and pharmacologic treatments for OAB  Nonsurgical and surgical treatments for Stress Urinary Incontinence  Nonsurgical and surgical treatments for POP  Pelvic muscle rehabilitation including pelvic floor PT  Topical estrogen therapy for treating UGA  Bowel routine/constipation regimen  Discussed dietary and behavioral modification, discussed pharmacologic and nonpharmacologic mgmt options for urinary symptoms. Discussed dietary & weight management with potential improvements in symptoms with weight loss.    Discussed mgmt of vulvovaginal atrophy with vaginal estrogen cream. Reviewed associated benefits, risks, alternatives, and goals. Recommend low dose twice weekly mgmt     Bowel management reviewed. Discussed using fiber daily w/ addition of miralax as needed    Discussed management of pelvic organ prolapse including but not limited to behavioral modifications, conservative options, and surgical management.   Discussed pessary management including benefits and risks. Discussed importance of keeping regularly scheduled pessary checks in prevention of complications related to pessary use.     Discussed dietary and behavioral modifications for mgmt of urinary symptoms  Discussed weight management and benefits of weight loss on urinary symptoms  Reviewed AUA/SUFU guidelines on mgmt of non-neurogenic OAB  Discussed pharmacologic and nonpharmacologic mgmt options of urinary symptoms - reviewed risks,  benefits, alternatives, and goals of treatment  Discussed specific risks related to OAB meds including, but not limited to dry mouth, constipation, blurry vision, cognitive changes, and BP elevation.    Discussed management options for ALANIS including expectant management, pelvic floor PT, pessary, and surgery  Discussed risks and benefits associated with each option  Discussed urodynamic testing    Diagnostic Items:  Urodynamics  Urine testing    Medications Discussed:  Estrace Cream  Fiber  Miralax    Treatment Plan, Non-surgical:   RN teaching/pt education done  Fiber supplement  Stimulant:  MOM, Miralax  Estrace / Premarin cream    Treatment Plan, Surgical:   None    Pt verbalizes understanding of all above discussed information. All questions answered. She agrees to plan    Return for UDS, f/u.    Ayesha Gay, DO, FACOG, FACS      Discussion undertaken in English, info provided      The 21st Century Cures Act makes medical notes like these available to patients in the interest of transparency. However, be advised this is a medical document. It is intended as peer to peer communication. It is written in medical language and may contain abbreviations or verbiage that are unfamiliar. It may appear blunt or direct. Medical documents are intended to carry relevant information, facts as evident, and the clinical opinion of the practitioner.          [1]   Past Medical History:   Asthma (HCC)    Diabetes mellitus (HCC)    GERD (gastroesophageal reflux disease)    Hypothyroid    MRSA (methicillin resistant staph aureus) culture positive    Infection to left hand/arm    Osteoarthritis    Other and unspecified hyperlipidemia    Type II or unspecified type diabetes mellitus without mention of complication, not stated as uncontrolled    Unspecified essential hypertension   [2]   Past Surgical History:  Procedure Laterality Date    Appendectomy      Cholecystectomy      Hysterectomy  2005    and bl oophorectomy     Lumpectomy left      Other      bladder lift    Other      varicose vein surgery    Other  January 2014    bladder mesh repair    Other surgical history  7/10/2014    Procedure: LUMBAR FACET INJECTION;  Surgeon: Gayle Corral MD;  Location: [facility]    Other surgical history  9/2/2014    Procedure: RADIOFREQUENCY ABLATION OF THORACIC OR LUMBAR MEDIAL BRANCH;  Surgeon: Gayle Corral MD;  Location: [facility]    Other surgical history  9/18/2014    Procedure: RADIOFREQUENCY ABLATION OF THORACIC OR LUMBAR MEDIAL BRANCH;  Surgeon: Gayle Corral MD;  Location: [facility]    Other surgical history Bilateral 3/9/2015    Procedure: SACROILIAC JOINT INJECTION BILATERAL;  Surgeon: Gayle Corral MD;  Location: [facility]    Other surgical history Right 9/3/2015    Procedure: RADIOFREQUENCY ABLATION OF THORACIC OR LUMBAR MEDIAL BRANCH;  Surgeon: Gayel Corral MD;  Location: [facility]    Other surgical history Left 9/15/2015    Procedure: RADIOFREQUENCY ABLATION OF THORACIC OR LUMBAR MEDIAL BRANCH;  Surgeon: Gayle Corral MD;  Location: [facility]    Other surgical history Bilateral 6/20/2016    Procedure: TRANSFORAMINAL LUMBAR EPIDURAL STEROID INJECTION SINGLE LEVEL;  Surgeon: Gayle Corral MD;  Location: [facility]    Stab phlebectomy, varicose veins, 1 extremity; <20 incisions Bilateral 2000    bl vein stripping   [3] No family history on file.  [4]   Social History  Socioeconomic History    Marital status:    Tobacco Use    Smoking status: Never    Smokeless tobacco: Never   Vaping Use    Vaping status: Never Used   Substance and Sexual Activity    Alcohol use: No     Alcohol/week: 0.0 standard drinks of alcohol    Drug use: No   Other Topics Concern    Caffeine Concern Yes     Comment: 1-2 cups daily    Exercise No     Comment: rare     Social Drivers of Health      Received from [facility]    Housing Stability   [5]   Allergies  Allergen Reactions    Iodine  (Topical) ANAPHYLAXIS     Shortness of breath.     Penicillin V ANAPHYLAXIS    Penicillins RASH     REPORTS ASTHMA ATTACK WITH PCN    Radiology Contrast Iodinated Dyes SHORTNESS OF BREATH    Sulfa Antibiotics RASH     Other reaction(s): Hives/Urticaria    Meloxicam NAUSEA ONLY     Had GI upset   [6]   Outpatient Medications Prior to Visit   Medication Sig Dispense Refill    atorvastatin 40 MG Oral Tab Take 1 tablet (40 mg total) by mouth every evening. 90 tablet 0    HYDROcodone-acetaminophen 5-325 MG Oral Tab Take 1 tablet by mouth every 8 (eight) hours as needed for Pain. 90 tablet 0    gabapentin 300 MG Oral Cap Take 1 capsule (300 mg total) by mouth 3 (three) times daily. 90 capsule 0    montelukast 10 MG Oral Tab Take 1 tablet (10 mg total) by mouth nightly. 90 tablet 1    valsartan 80 MG Oral Tab Take 1 tablet (80 mg total) by mouth daily. 90 tablet 0    Omeprazole 40 MG Oral Capsule Delayed Release Take 1 capsule (40 mg total) by mouth every morning. 90 capsule 0    levothyroxine 100 MCG Oral Tab Take 1 tablet (100 mcg total) by mouth before breakfast. 30 tablet 1    albuterol 108 (90 Base) MCG/ACT Inhalation Aero Soln Inhale 1 puff into the lungs every 6 (six) hours as needed for Wheezing. 8 g 0    magnesium 30 MG Oral Tab Take 1 tablet (30 mg total) by mouth daily.      ketoconazole 2 % External Cream Apply 1 Application topically daily. 15 g 0    Ascorbic Acid (VITAMIN C OR) Take by mouth.      Glucosamine-Chondroit-Vit C-Mn (GLUCOSAMINE 1500 COMPLEX) Oral Cap Take 1,500 mg by mouth daily.      Cholecalciferol 125 MCG (5000 UT) Oral Tab Take 1 tablet (5,000 Units total) by mouth daily.      vitamin E 100 UNITS Oral Cap Take 1 capsule (100 Units total) by mouth daily.      acetaminophen 160 MG Oral Chew Tab Chew 1 tablet (160 mg total) by mouth daily as needed for Pain.       No facility-administered medications prior to visit.

## 2025-05-06 DIAGNOSIS — M43.07 LUMBOSACRAL SPONDYLOLYSIS: ICD-10-CM

## 2025-05-06 DIAGNOSIS — F11.90 CHRONIC NARCOTIC USE: ICD-10-CM

## 2025-05-06 DIAGNOSIS — M54.16 LUMBAR RADICULOPATHY: ICD-10-CM

## 2025-05-06 DIAGNOSIS — Z79.891 LONG TERM (CURRENT) USE OF OPIATE ANALGESIC: ICD-10-CM

## 2025-05-06 DIAGNOSIS — M51.369 DDD (DEGENERATIVE DISC DISEASE), LUMBAR: ICD-10-CM

## 2025-05-06 DIAGNOSIS — Z79.891 CHRONIC PRESCRIPTION OPIATE USE: ICD-10-CM

## 2025-05-06 DIAGNOSIS — M47.816 LUMBAR SPONDYLOSIS: ICD-10-CM

## 2025-05-06 NOTE — TELEPHONE ENCOUNTER
Patient contacted clinic for refill on HYDROcodone-acetaminophen. Please advise   Inflammation Suggestive Of Cancer Camouflage Histology Text: There was a dense lymphocytic infiltrate which prevented adequate histologic evaluation of adjacent structures.

## 2025-05-07 RX ORDER — HYDROCODONE BITARTRATE AND ACETAMINOPHEN 5; 325 MG/1; MG/1
1 TABLET ORAL EVERY 8 HOURS PRN
Qty: 90 TABLET | Refills: 0 | Status: SHIPPED | OUTPATIENT
Start: 2025-05-07 | End: 2025-06-06

## 2025-05-07 NOTE — TELEPHONE ENCOUNTER
Medication: HYDROcodone-acetaminophen 5-325 MG Oral Tab     Date of last refill: 4/5/25  [place] 4[place]5/25    Last office visit: 3/10/25  Due back to clinic per last office note:  3 months  Date next office visit scheduled:  6/9/25    Last URINE Screen: 3/14/25  Screen Results:    Loida Bustamante, APRN  3/17/2025  7:48 AM CDT Back to Top      As expected         Narcotic Contract EXPIRATION date: 9/24/25    Last OV note recommendation:   Medical Decision Making:   Diagnosis:         Encounter Diagnoses   Name Primary?    Lumbar radiculopathy Yes    Chronic narcotic use      Lumbosacral spondylolysis      Long term (current) use of opiate analgesic      Chronic prescription opiate use                 Impression:   Patient is an 88-year-old female with past medical history of MDD, type 2 diabetes with microalbumin being urea, hyperlipidemia, hypertension, delusional.  Cytosis, carotid atherosclerosis, hypothyroidism, history of stroke, at risk for falls, with chronic back pain bilateral radicular symptoms.  Patient uses hydrocodone 5/325 3 times daily as needed along with gabapentin 300 mg 3 times daily.  Patient has longstanding history of chronic back pain and feels that the medications help manage her symptoms.        Patient is reporting her most significant pain is in her low back across the back and down her legs left greater than right..  She uses heat and stretching at home.  Patient did not feel that the lumbar epidural steroid injection helped her back symptoms.  She did have facet injections in 2022 but cannot recall if those were helpful.  She is not interested in injection therapy.  She would like to just continue her medications to help with her pain symptoms.          Prior Conservative Treatment: Medications and physical therapy failed in past      She is not an NSAID candidate due to CKD.  Patient will update her  urine drug screen.     Total visit time: 30  minutes  More than 50% spent coordinating care, providing patient education, reviewing imaging, discussing conservative vs surgical treatment options and counseling.        Plan:   > No further injections at this time: Patient has had medial branch blocks and lumbar epidural steroid injections without significant relief  > Continue gabapentin 300 mg 3 times daily and hydrocodone 5/325 3 times daily as needed for pain: Call for refills  > Encouraged patient to follow-up with her primary care doctor regarding use of chronic NSAIDs, bladder complaints and abdominal hernia complaints.  > Follow-up in 3 months for medication follow-up and management  > UDS needs to be completed/ordered today patient aware      Orders Placed This Encounter   Procedures    Pain Management Drug Panel, Urine         Meds & Refills for this Visit:  Requested Prescriptions        No prescriptions requested or ordered in this encounter         Imaging & Consults:  None     The patient indicates understanding of these issues and agrees to the plan.        ID#[id number]

## 2025-05-08 DIAGNOSIS — M47.819 ARTHROPATHY OF FACET JOINT: ICD-10-CM

## 2025-05-08 DIAGNOSIS — M43.07 LUMBOSACRAL SPONDYLOLYSIS: ICD-10-CM

## 2025-05-08 DIAGNOSIS — M54.16 LUMBAR RADICULOPATHY: ICD-10-CM

## 2025-05-08 RX ORDER — GABAPENTIN 300 MG/1
300 CAPSULE ORAL 3 TIMES DAILY
Qty: 90 CAPSULE | Refills: 0 | Status: SHIPPED | OUTPATIENT
Start: 2025-05-08

## 2025-05-08 NOTE — TELEPHONE ENCOUNTER
Medication: gabapentin 300 MG     Date of last refill: 04/05/25      Last office visit: 03/10/25  Due back to clinic per last office note:  3 months  Date next office visit scheduled:  06/09/25        Last OV note recommendation:   Plan:   > No further injections at this time: Patient has had medial branch blocks and lumbar epidural steroid injections without significant relief  > Continue gabapentin 300 mg 3 times daily and hydrocodone 5/325 3 times daily as needed for pain: Call for refills  > Encouraged patient to follow-up with her primary care doctor regarding use of chronic NSAIDs, bladder complaints and abdominal hernia complaints.  > Follow-up in 3 months for medication follow-up and management  > UDS needs to be completed/ordered today patient aware

## 2025-05-13 ENCOUNTER — TELEPHONE (OUTPATIENT)
Dept: UROLOGY | Facility: CLINIC | Age: 89
End: 2025-05-13

## 2025-05-15 ENCOUNTER — OFFICE VISIT (OUTPATIENT)
Dept: UROLOGY | Facility: CLINIC | Age: 89
End: 2025-05-15
Attending: OBSTETRICS & GYNECOLOGY
Payer: MEDICARE

## 2025-05-15 VITALS
WEIGHT: 152 LBS | SYSTOLIC BLOOD PRESSURE: 124 MMHG | RESPIRATION RATE: 16 BRPM | DIASTOLIC BLOOD PRESSURE: 82 MMHG | BODY MASS INDEX: 31 KG/M2

## 2025-05-15 DIAGNOSIS — R35.1 NOCTURIA: ICD-10-CM

## 2025-05-15 DIAGNOSIS — N39.3 FEMALE STRESS INCONTINENCE: ICD-10-CM

## 2025-05-15 DIAGNOSIS — N95.2 POSTMENOPAUSAL ATROPHIC VAGINITIS: ICD-10-CM

## 2025-05-15 DIAGNOSIS — N39.41 URGE INCONTINENCE: ICD-10-CM

## 2025-05-15 DIAGNOSIS — N81.11 CYSTOCELE, MIDLINE: Primary | ICD-10-CM

## 2025-05-15 LAB
CONTROL RUN WITHIN 24 HOURS?: NO
LEUKOCYTE ESTERASE URINE: NEGATIVE
NITRITE URINE: NEGATIVE

## 2025-05-15 PROCEDURE — 81002 URINALYSIS NONAUTO W/O SCOPE: CPT

## 2025-05-15 PROCEDURE — 51784 ANAL/URINARY MUSCLE STUDY: CPT

## 2025-05-15 PROCEDURE — 51741 ELECTRO-UROFLOWMETRY FIRST: CPT

## 2025-05-15 PROCEDURE — 51729 CYSTOMETROGRAM W/VP&UP: CPT

## 2025-05-15 PROCEDURE — 51797 INTRAABDOMINAL PRESSURE TEST: CPT

## 2025-05-15 NOTE — PROCEDURES
Patient here for urodynamic testing.Daughter , Maggie with patient, is not present for procedure .  Procedure explained and confirmed by patient.  See evaluation form for results.  Both verbal and written discharge instructions were given.  Patient tolerated procedure well and will follow up with Dr. Ayesha Gay on 25.    URODYNAMIC EVALUATION    PATIENT HISTORY:    Prolapse:  Yes - previous hyst, repairs x 3  ALANIS:  Yes  UUI:  Yes  Nocturia:  more than 4 patient does admit to not sleeping well, is awakened by noises - at times does not make it to BR  Frequency:  every 1-2 hours  Sense of Incomplete Emptying:  No  Constipation:  Yes Bowel regime reviewed in detail - Feels she does not evacuate BM well   Last void prior to UDS testin minutes  Current urge to void?  Moderate  OAB meds stopped prior to test?  NA  Other symptoms?  [facility] - patient reports using generous amount every night - Finger tip method explained clearly in Maori  Patient using walker - requires help with undressing / assistance to chair   Surgery?  [x]  No  []  Interested in surgery:   []  Yes, specify date:    Surgical folder provided?  []  Yes  [x]  No     PATIENT DIAGNOSIS:  Cystocele, Midline N81.11, Urge Incontinence N39.41, and Stress Incontinence N39.3    UDS PROCEDURAL FINDINGS:  Stress Incontinence N39.3 and Detrusor Instability N31.9    MEDICATION: Medications Taking[1]     ALLERGIES:  Iodine (topical), Penicillin v, Penicillins, Radiology contrast iodinated dyes, Sulfa antibiotics, and Meloxicam      EXAM:  Urinalysis Dip:  Today's Results   Component Date Value    control run 05/15/2025 No     Blood Urine 05/15/2025 Trace (A)     Nitrite Urine 05/15/2025 Negative     Leukocyte esterase urine 05/15/2025 Negative       Urovesico Junction ( <30 degrees ):  []  Mobile  [x]  Fixed    Perineal Sensation:  [x]  Normal  []  Abnormal    Additional Notes:    PROLAPSE:  [x]  Yes  []  No  Prolapse reduced for testing?  [x]  Yes   []  No  [x]  Pessary # 2 ring with support initially placed - patient reports discomfort with bladder filling with device   []  Manual  [x]  Half Speculum    Additional Notes:    UROFLOWMETRY:  Unreduced  Voided Volume:              62               mL  Maximum Flow Rate:                                mL/sec  Average flow rate:                             mL/sec  Post-void Residual:             20              mL  Pattern:  []  Normal  []  Poor flow     [x]  Intermittent  []  Other  Void:   []  Typical  [x]  Atypical    Additional Notes: Patient sat on commode with urocap placed beneath to capture uroflow -  volume tracing was intermittent - did not  max/ average  flow - assisted back to S chair to complete study    CYSTOMETRY:  Urethral Catheter:  Fr 7 / tdoc  Abd Catheter:     Fr 7 / tdoc   Infusion:  Water Rate 30 mL/min  Temp:  Room  Position:  [x]  Sit  []  Stand  []  Supine  First sensation:   112 mL  First desire to void:   181 mL  Strong desire to void:  231       mL  Maximum cystometric capacity:   302 mL  Detrusor Activity:  [x]  Unstable   []  Stable  Urge leakage?    []  Yes [x]  No  Volume at 1st unhibited detrusor cont:   231 mL  Detrusor instability provoked by:    []  Spontaneous []  Coughing  [x]  Filling  []  Valsalva  []  Other    Additional Notes:      URETHRAL FUNCTION:  Valsava (vesical) Leak Point Pressures:    Volume Leak Point Pressure Leak?    Cough Valsalva      100mL 152 109    cm H2O []  Yes [x]  No         REDUCED: half spec/ pessary  Volume Leak Point Pressure Leak?    Cough Valsalva      100mL 104 22   cm H2O [x]  Yes []  No   200mL 49     cm H2O [x]  Yes []  No       Genuine Stress Incontinence demonstrated?   [x]  Yes @ 100 ml reduced  []  No    Resting Urethral Pressure Profile:     Functional Urethral Length: manual pull  Maximum UCP:          32 cm        34     cm         30         cm    PRESSURE/FLOW STUDY:  Unreduced  Voided volume:    300 plus  plus mL  Maximum  flow rate:       15 mL/sec  Pressure Detrusor (at maximum flow):        124    cm H2O  Post void residual:      15         mL  Voiding mechanism:  [x]  Abnormal  []  Normal  [x]  Strain to void   []  Weak detrusor  Void:   []  Typical   []  Atypical    Additional Notes:  Uroflowmetry, cystometry, and pressure / flow study were completed using calibrated electronic equipment and air charged transducers.    EMG:  During fill: Reactive    During flow study: Reactive    5/15/2025 10:21 AM     PERFORMED BY:  Cyndi HARTMAN RN      URODYNAMIC PHYSICIAN INTERPRETATION    IMPRESSION:   62/20cc & 300+/15cc  group home 302cc  DO @231cc  ALANIS Yes @ 100 ml reduced   Post-Procedure Diagnoses: Detrusor instability [N31.9] and Stress urinary incontinence [N39.3]    Comments:      Ayesha Gay DO   5/15/2025   1:29 PM             [1]   Outpatient Medications Marked as Taking for the 5/15/25 encounter (Office Visit) with LIS PROCEDURE   Medication Sig Dispense Refill    GABAPENTIN 300 MG Oral Cap TAKE 1 CAPSULE(300 MG) BY MOUTH THREE TIMES DAILY 90 capsule 0    montelukast 10 MG Oral Tab Take 1 tablet (10 mg total) by mouth nightly. 90 tablet 1    valsartan 80 MG Oral Tab Take 1 tablet (80 mg total) by mouth daily. 90 tablet 0    atorvastatin 40 MG Oral Tab Take 1 tablet (40 mg total) by mouth every evening. 90 tablet 0    Omeprazole 40 MG Oral Capsule Delayed Release Take 1 capsule (40 mg total) by mouth every morning. 90 capsule 0

## 2025-05-15 NOTE — PATIENT INSTRUCTIONS
[facility] UROGYNECOLOGY  [address] [address]  PH: [phone]  FAX: [phone]       Urodynamic Testing Discharge Instructions:    There are NO dietary or activity restrictions.  You may resume your normal schedule.      You may have mild discomfort for a few hours after your testing today.  There may be some mild burning when you urinate or you may see some blood in your urine.  These problems should not last more than 24 hours.  The following suggestions may minimize any symptoms you experience.    Drink 6-8 large glasses of water over the next 8 hours  A compress or sitz bath may be soothing  Tylenol or Ibuprofen may be taken as needed    If you experience any of the following, please call the office or, if after hours, the on-call physician at [phone].    Excessive pain  Bright red bloody discharge  Fever or chills  Continued urgency, frequency or burning with urination    Obtaining Test Results    Your urodynamic test will be interpreted by a specialist and available to the referring physician within 7-14 days.  Patients in our clinic are given an appointment to come back to discuss the results and any appropriate treatment recommendations.    Please do not hesitate to contact our office with any questions or concerns at [phone].    I acknowledge that I have received verbal and written discharge  instructions and that I understand these instructions clearly.    Patient Signature:    Date:

## 2025-05-27 ENCOUNTER — VIRTUAL PHONE E/M (OUTPATIENT)
Dept: UROLOGY | Facility: CLINIC | Age: 89
End: 2025-05-27
Attending: OBSTETRICS & GYNECOLOGY
Payer: MEDICARE

## 2025-05-27 DIAGNOSIS — N39.3 FEMALE STRESS INCONTINENCE: ICD-10-CM

## 2025-05-27 DIAGNOSIS — N95.2 POSTMENOPAUSAL ATROPHIC VAGINITIS: ICD-10-CM

## 2025-05-27 DIAGNOSIS — N81.11 CYSTOCELE, MIDLINE: ICD-10-CM

## 2025-05-27 DIAGNOSIS — N32.81 DETRUSOR INSTABILITY: Primary | ICD-10-CM

## 2025-05-27 DIAGNOSIS — N39.41 URGE INCONTINENCE: ICD-10-CM

## 2025-05-27 RX ORDER — VIBEGRON 75 MG/1
1 TABLET, FILM COATED ORAL DAILY
Qty: 30 TABLET | Refills: 11 | Status: SHIPPED | OUTPATIENT
Start: 2025-05-27

## 2025-05-27 NOTE — PATIENT INSTRUCTIONS
[email or web address]://[email or web address]-content/uploads/[email or web address]/OAB.[email or web address]  [email or web address]://www.[employer].org/wp-content/uploads/2025/02/Overactive_Bladder_Spanish.pdf    https://www.[employer].org/wp-content/uploads/2025/02/Bladder_Training.pdf  https://www.[employer].org/wp-content/uploads/2025/02/Pelvic_Floor_Muscle_Bladder_Training_Spanish.pdf    https://www.[employer].org/wp-content/uploads/2025/02/POP.pdf  https://www.[employer].org/wp-content/uploads/2025/02/Pelvic_Organ_Prolapse_Spanish.pdf    https://www.[employer].org/wp-content/uploads/2025/02/ALANIS.pdf  https://www.[employer].org/wp-content/uploads/2025/02/SUI_Spanish.pdf    https://www.[employer].org/wp-content/uploads/2025/02/Vaginal_Pessaries.pdf  https://www.[employer].org/wp-content/uploads/2025/02/Vag_Pessaries_Spanish.pdf    https://www.[employer].org/wp-content/uploads/2025/02/Mid-urethral_Sling.pdf  https://www.[employer].org/wp-content/uploads/2025/02/Mid-Urethral_Sling_Spanish.pdf    https://www.[employer].org/wp-content/uploads/2025/02/Botox.pdf  https://www.[employer].org/wp-content/uploads/2025/02/Botox_Spanish-2.pdf    https://www.[employer].org/wp-content/uploads/2025/02/Sacral_Neuromodulation_Factsheet_FINAL.pdf

## 2025-05-27 NOTE — PROGRESS NOTES
Pt presents w/ initial c/o UI  Urodynamic testing undergone without complication.  Results reviewed with patient & her family via telephone  This visit is being conducted as a televisit with pt's consent.  Pt in safe, private environment for televisit, provider located in the office setting    62/20cc & 300+/15cc  [facility] 302cc  DO @231cc  ALANIS Yes @ 100 ml reduced     Discussed with patient mgmt options for cystocele, ALANIS, DO/UUI  Biggest bother is UUI -- long standing sx  Bowels improving, but needs to take something everyday  Started vag estrogen  Minimal bulge bother    Discussed management of pelvic organ prolapse including but not limited to behavioral modifications, conservative options, and surgical management.   Discussed pessary management including benefits and risks. Discussed importance of keeping regularly scheduled pessary checks in prevention of complications related to pessary use.     Discussed management options for ALANIS including expectant management, pelvic floor PT, pessary, and surgery  Discussed risks and benefits associated with each option  Discussed urodynamic testing & results    Discussed dietary and behavioral modifications for mgmt of urinary symptoms  Discussed weight management and benefits of weight loss on urinary symptoms  Reviewed AUA/SUFU guidelines on mgmt of non-neurogenic OAB  Discussed pharmacologic and nonpharmacologic mgmt options of urinary symptoms - reviewed risks, benefits, alternatives, and goals of treatment  Discussed specific risks related to OAB meds including, but not limited to dry mouth, constipation, blurry vision, cognitive changes, and BP elevation.    Bowel management reviewed. Discussed using fiber daily w/ addition of miralax as needed    Discussed mgmt of vulvovaginal atrophy with vaginal estrogen cream. Reviewed associated benefits, risks, alternatives, and goals. Recommend low dose twice weekly mgmt     Thorough discussion of surgical risks, benefits, and  alternatives including, but not limited to bleeding/clots, infection, injury to nearby organs (urethra, bladder, ureters, bowel, blood vessels), mesh erosion, dyspareunia, de frandy UI, worsening UI, recurrence, voiding dysfunction, and pain.    All questions answered.  Pt will proceed gemtesa 75mg daily (avoid anticholinergics given underlying bowel dysfunction)  Cont bowel mgmt  Cont vag estrogen twice weekly    Call with s/sx of UTI    Follow up meds in 6 wks, sooner prn    Dr. Ayesha Gay

## 2025-05-30 DIAGNOSIS — E03.9 ACQUIRED HYPOTHYROIDISM: ICD-10-CM

## 2025-05-30 RX ORDER — LEVOTHYROXINE SODIUM 100 UG/1
100 TABLET ORAL
Qty: 30 TABLET | Refills: 1 | Status: SHIPPED | OUTPATIENT
Start: 2025-05-30

## 2025-05-30 NOTE — TELEPHONE ENCOUNTER
Last time medication was refilled 03/31/2025  Last office visit  04/09/2025  Next office visit due/scheduled   Future Appointments   Date Time Provider Department Center   6/9/2025  8:15 AM Anshu Siddiqui MD [facility] [place]   7/14/2025  7:40 AM Winnie Keith PA [facility] None   9/6/2025  9:30 AM Shannon Herring APRN [facility] [address]           Medication failed protocol.

## 2025-06-05 DIAGNOSIS — Z79.891 CHRONIC PRESCRIPTION OPIATE USE: ICD-10-CM

## 2025-06-05 DIAGNOSIS — M47.819 ARTHROPATHY OF FACET JOINT: ICD-10-CM

## 2025-06-05 DIAGNOSIS — M54.16 LUMBAR RADICULOPATHY: ICD-10-CM

## 2025-06-05 DIAGNOSIS — M51.369 DDD (DEGENERATIVE DISC DISEASE), LUMBAR: ICD-10-CM

## 2025-06-05 DIAGNOSIS — F11.90 CHRONIC NARCOTIC USE: ICD-10-CM

## 2025-06-05 DIAGNOSIS — M47.816 LUMBAR SPONDYLOSIS: ICD-10-CM

## 2025-06-05 DIAGNOSIS — Z79.891 LONG TERM (CURRENT) USE OF OPIATE ANALGESIC: ICD-10-CM

## 2025-06-05 DIAGNOSIS — M43.07 LUMBOSACRAL SPONDYLOLYSIS: ICD-10-CM

## 2025-06-05 RX ORDER — GABAPENTIN 300 MG/1
300 CAPSULE ORAL 3 TIMES DAILY
Qty: 90 CAPSULE | Refills: 0 | Status: SHIPPED | OUTPATIENT
Start: 2025-06-07

## 2025-06-05 RX ORDER — HYDROCODONE BITARTRATE AND ACETAMINOPHEN 5; 325 MG/1; MG/1
1 TABLET ORAL EVERY 8 HOURS PRN
Qty: 90 TABLET | Refills: 0 | Status: SHIPPED | OUTPATIENT
Start: 2025-06-06 | End: 2025-07-06

## 2025-06-05 NOTE — TELEPHONE ENCOUNTER
Patient calling to request refill of GABAPENTIN 300 MG Oral Cap AND HYDROcodone-acetaminophen 5-325 MG Oral Tab   Pharmacy [id number] [place] [address] [address] [facility] [address], [phone], [phone]     Patient informed of 48 hour refill policy excluding weekends and holidays.   Informed patient prescription is sent directly to pharmacy.    Further explained patient will not receive a call back once prescription is ready.

## 2025-06-05 NOTE — TELEPHONE ENCOUNTER
Medication: HYDROcodone-acetaminophen 5-325 MG Oral Tab     [place]          Medication: GABAPENTIN 300 MG Oral Cap     [place] 5[place]8/2025      Last office visit: 3/10/2025  Due back to clinic per last office note:  3 months  Date next office visit scheduled:  6/9/2025 with     Last URINE Screen: 3/14/2025  Screen Results:    Loida Bustamante, APRN  3/17/2025  7:48 AM CDT Back to Top      As expected         Narcotic Contract EXPIRATION date: 9/24/25    Last OV note recommendation: Plan:   > No further injections at this time: Patient has had medial branch blocks and lumbar epidural steroid injections without significant relief  > Continue gabapentin 300 mg 3 times daily and hydrocodone 5/325 3 times daily as needed for pain: Call for refills  > Encouraged patient to follow-up with her primary care doctor regarding use of chronic NSAIDs, bladder complaints and abdominal hernia complaints.  > Follow-up in 3 months for medication follow-up and management  > UDS needs to be completed/ordered today patient aware

## 2025-06-09 ENCOUNTER — TELEPHONE (OUTPATIENT)
Dept: UROLOGY | Facility: CLINIC | Age: 89
End: 2025-06-09

## 2025-06-09 NOTE — TELEPHONE ENCOUNTER
Denied prior authorization for Gemtesa received from [employer]. Denial letter does state that brand name Myrbetriq is a covered medication.   Denial letter sent to scan.    Would provider like to change Rx?

## 2025-06-20 ENCOUNTER — LAB ENCOUNTER (OUTPATIENT)
Dept: LAB | Age: 89
End: 2025-06-20
Attending: ANESTHESIOLOGY
Payer: MEDICARE

## 2025-06-20 ENCOUNTER — OFFICE VISIT (OUTPATIENT)
Dept: PAIN CLINIC | Facility: CLINIC | Age: 89
End: 2025-06-20
Payer: MEDICARE

## 2025-06-20 VITALS
SYSTOLIC BLOOD PRESSURE: 146 MMHG | BODY MASS INDEX: 31 KG/M2 | WEIGHT: 152 LBS | OXYGEN SATURATION: 95 % | DIASTOLIC BLOOD PRESSURE: 86 MMHG | HEART RATE: 80 BPM

## 2025-06-20 DIAGNOSIS — M54.16 LUMBAR RADICULOPATHY: ICD-10-CM

## 2025-06-20 DIAGNOSIS — M51.369 DDD (DEGENERATIVE DISC DISEASE), LUMBAR: ICD-10-CM

## 2025-06-20 DIAGNOSIS — Z79.891 LONG TERM (CURRENT) USE OF OPIATE ANALGESIC: ICD-10-CM

## 2025-06-20 DIAGNOSIS — E03.9 ACQUIRED HYPOTHYROIDISM: ICD-10-CM

## 2025-06-20 DIAGNOSIS — Z79.891 CHRONIC PRESCRIPTION OPIATE USE: ICD-10-CM

## 2025-06-20 DIAGNOSIS — M47.816 LUMBAR SPONDYLOSIS: ICD-10-CM

## 2025-06-20 DIAGNOSIS — M43.07 LUMBOSACRAL SPONDYLOLYSIS: ICD-10-CM

## 2025-06-20 DIAGNOSIS — Z79.891 LONG TERM (CURRENT) USE OF OPIATE ANALGESIC: Primary | ICD-10-CM

## 2025-06-20 DIAGNOSIS — F11.90 CHRONIC NARCOTIC USE: ICD-10-CM

## 2025-06-20 LAB — TSI SER-ACNC: 2.39 UIU/ML (ref 0.55–4.78)

## 2025-06-20 PROCEDURE — 3077F SYST BP >= 140 MM HG: CPT | Performed by: ANESTHESIOLOGY

## 2025-06-20 PROCEDURE — 36415 COLL VENOUS BLD VENIPUNCTURE: CPT

## 2025-06-20 PROCEDURE — 80307 DRUG TEST PRSMV CHEM ANLYZR: CPT

## 2025-06-20 PROCEDURE — G2211 COMPLEX E/M VISIT ADD ON: HCPCS | Performed by: ANESTHESIOLOGY

## 2025-06-20 PROCEDURE — 1159F MED LIST DOCD IN RCRD: CPT | Performed by: ANESTHESIOLOGY

## 2025-06-20 PROCEDURE — 84443 ASSAY THYROID STIM HORMONE: CPT

## 2025-06-20 PROCEDURE — 99213 OFFICE O/P EST LOW 20 MIN: CPT | Performed by: ANESTHESIOLOGY

## 2025-06-20 PROCEDURE — 1160F RVW MEDS BY RX/DR IN RCRD: CPT | Performed by: ANESTHESIOLOGY

## 2025-06-20 PROCEDURE — 3079F DIAST BP 80-89 MM HG: CPT | Performed by: ANESTHESIOLOGY

## 2025-06-20 RX ORDER — HYDROCODONE BITARTRATE AND ACETAMINOPHEN 5; 325 MG/1; MG/1
1 TABLET ORAL EVERY 8 HOURS PRN
Qty: 90 TABLET | Refills: 0 | Status: SHIPPED | OUTPATIENT
Start: 2025-07-04 | End: 2025-08-03

## 2025-06-20 NOTE — PATIENT INSTRUCTIONS
Refill policies:    Allow 2-3 business days for refills; controlled substances may take longer.  Contact your pharmacy at least 5 days prior to running out of medication and have them send an electronic request or submit request through the “request refill” option in your [employer] account.  Refills are not addressed on weekends; covering physicians do not authorize routine medications on weekends.  No narcotics or controlled substances are refilled after noon on Fridays or by on call physicians.  By law, narcotics must be electronically prescribed.  A 30 day supply with no refills is the maximum allowed.  If your prescription is due for a refill, you may be due for a follow up appointment.  To best provide you care, patients receiving routine medications need to be seen at least once a year.  Patients receiving narcotic/controlled substance medications need to be seen at least once every 3 months.  In the event that your preferred pharmacy does not have the requested medication in stock (e.g. Backordered), it is your responsibility to find another pharmacy that has the requested medication available.  We will gladly send a new prescription to that pharmacy at your request.    Scheduling Tests:    If your physician has ordered radiology tests such as MRI or CT scans, please contact Central Scheduling at [phone] right away to schedule the test.  Once scheduled, the [facility] Centralized Referral Team will work with your insurance carrier to obtain pre-certification or prior authorization.  Depending on your insurance carrier, approval may take 3-10 days.  It is highly recommended patients assure they have received an authorization before having a test performed.  If test is done without insurance authorization, patient may be responsible for the entire amount billed.      Precertification and Prior Authorizations:  If your physician has recommended that you have a procedure or additional testing performed the [facility]  Centralized Referral Team will contact your insurance carrier to obtain pre-certification or prior authorization.    You are strongly encouraged to contact your insurance carrier to verify that your procedure/test has been approved and is a COVERED benefit.  Although the [facility] Centralized Referral Team does its due diligence, the insurance carrier gives the disclaimer that \"Although the procedure is authorized, this does not guarantee payment.\"    Ultimately the patient is responsible for payment.   Thank you for your understanding in this matter.  Paperwork Completion:  If you require FMLA or disability paperwork for your recovery, please make sure to either drop it off or have it faxed to our office at [phone]. Be sure the form has your name and date of birth on it.  The form will be faxed to our Forms Department and they will complete it for you.  There is a 25$ fee for all forms that need to be filled out.  Please be aware there is a 10-14 day turnaround time.  You will need to sign a release of information (HARSHIL) form if your paperwork does not come with one.  You may call the Forms Department with any questions at [phone].  Their fax number is [phone].

## 2025-06-20 NOTE — PROGRESS NOTES
Patient presents in office today with reported pain in low back    Current pain level reported = 8/10    Last reported dose of norco and gabapentin this morning      Narcotic Contract renewal 09/2025    Urine Drug screen 03/2025

## 2025-06-20 NOTE — PROGRESS NOTES
Name: Dana Iraheta   : 1935   DOS: 2025     Pain Clinic Follow Up Visit:     Chief Complaint   Patient presents with    Follow - Up     Low back       Dana Iraheta is a 89 year old female with longstanding history of low back pain treated with hydrocodone through this office.  Patient states that medications continue provide analgesia and improved function.  Denies any side effects associate with the medication.    Pt denies any chills, fever, or weakness. There is no bladder or bowel incontinence associated with the pain.    REVIEW OF SYSTEMS:  A ten point review of systems was performed with pertinent positives and negatives in the HPI.    Allergies[1]    Current Medications[2]      EXAM:   /86   Pulse 80   Wt 152 lb (68.9 kg)   SpO2 95%   BMI 30.70 kg/m²   General:  Patient is a(n) 89 year old year old female in no acute distress.  Neurologic:: WNL-Orientation to time, place and person, normal mood & affect, concentration & attention span intact.   Inspection:  Ambulates with well-coordinated, fluid, non-antalgic gait.  Gait is normal.  Neck: Full range of motion  Back: Gait intact  IMAGES:       No new imaging  ASSESSMENT AND PLAN:     1. Long term (current) use of opiate analgesic    2. Lumbar radiculopathy    3. Lumbosacral spondylolysis    4. Chronic narcotic use    5. Lumbar spondylosis    6. DDD (degenerative disc disease), lumbar    7. Chronic prescription opiate use      The patient is an 89-year-old female with longstanding history of low back pain.  This is managed with hydrocodone and gabapentin.  Patient states medications provide analgesia improved function.  Here for medication review.  Discussed the safe opiate usage.  Reviewed last urine drug screen.  Medication refilled for July.      Pain is  limiting functional status. Failed conservative treatment consisting of medications, activity modification, and  PT.  1.Risks of long term narcotic use d/w pt including  dependence, abuse, and death from overdose and mixing narcotic with alcohol. Pt verbalized understanding.     Medications filled today:  Requested Prescriptions     Signed Prescriptions Disp Refills    HYDROcodone-acetaminophen 5-325 MG Oral Tab 90 tablet 0     Sig: Take 1 tablet by mouth every 8 (eight) hours as needed for Pain.     Orders:  Orders Placed This Encounter   Procedures    Pain Management Drug Panel, Urine         Radiology orders and consultations:None  The patient indicates understanding of these issues and agrees to the plan.  No follow-ups on file.    Anshu Siddiqui MD, 6/20/2025, 8:59 AM              [1]   Allergies  Allergen Reactions    Iodine (Topical) ANAPHYLAXIS and SHORTNESS OF BREATH    Penicillins ANAPHYLAXIS and RASH    Sulfa Antibiotics HIVES and RASH    Radiology Contrast Iodinated Dyes SHORTNESS OF BREATH    Meloxicam NAUSEA ONLY     Had GI upset   [2]   Current Outpatient Medications   Medication Sig Dispense Refill    [START ON 7/4/2025] HYDROcodone-acetaminophen 5-325 MG Oral Tab Take 1 tablet by mouth every 8 (eight) hours as needed for Pain. 90 tablet 0    MYRBETRIQ 50 MG Oral Tablet 24 Hr Take 1 tablet (50 mg total) by mouth daily. 30 tablet 11    gabapentin 300 MG Oral Cap Take 1 capsule (300 mg total) by mouth 3 (three) times daily. 90 capsule 0    LEVOTHYROXINE 100 MCG Oral Tab TAKE 1 TABLET(100 MCG) BY MOUTH BEFORE BREAKFAST 30 tablet 1    estradiol (ESTRACE) 0.1 MG/GM Vaginal Cream Apply 1/2 gram vaginally 2 times per week. 42 g 3    montelukast 10 MG Oral Tab Take 1 tablet (10 mg total) by mouth nightly. 90 tablet 1    valsartan 80 MG Oral Tab Take 1 tablet (80 mg total) by mouth daily. 90 tablet 0    atorvastatin 40 MG Oral Tab Take 1 tablet (40 mg total) by mouth every evening. 90 tablet 0    Omeprazole 40 MG Oral Capsule Delayed Release Take 1 capsule (40 mg total) by mouth every morning. 90 capsule 0    albuterol 108 (90 Base) MCG/ACT Inhalation Aero Soln Inhale 1 puff  into the lungs every 6 (six) hours as needed for Wheezing. 8 g 0    magnesium 30 MG Oral Tab Take 1 tablet (30 mg total) by mouth in the morning.      ketoconazole 2 % External Cream Apply 1 Application topically daily. 15 g 0    Ascorbic Acid (VITAMIN C OR) Take by mouth.      Glucosamine-Chondroit-Vit C-Mn (GLUCOSAMINE 1500 COMPLEX) Oral Cap Take 1,500 mg by mouth daily.      Cholecalciferol 125 MCG (5000 UT) Oral Tab Take 1 tablet (5,000 Units total) by mouth daily.      vitamin E 100 UNITS Oral Cap Take 1 capsule (100 Units total) by mouth daily.      acetaminophen 160 MG Oral Chew Tab Chew 1 tablet (160 mg total) by mouth daily as needed for Pain.

## 2025-07-03 DIAGNOSIS — M47.819 ARTHROPATHY OF FACET JOINT: ICD-10-CM

## 2025-07-03 DIAGNOSIS — M54.16 LUMBAR RADICULOPATHY: ICD-10-CM

## 2025-07-03 DIAGNOSIS — M43.07 LUMBOSACRAL SPONDYLOLYSIS: ICD-10-CM

## 2025-07-03 RX ORDER — GABAPENTIN 300 MG/1
300 CAPSULE ORAL 3 TIMES DAILY
Qty: 90 CAPSULE | Refills: 0 | Status: SHIPPED | OUTPATIENT
Start: 2025-07-03

## 2025-07-03 NOTE — TELEPHONE ENCOUNTER
Medication: gabapentin 300 MG     [place]    Last office visit: 06/20/25  Due back to clinic per last office note:  na  Date next office visit scheduled:  none      Last OV note recommendation:   ASSESSMENT AND PLAN:      1. Long term (current) use of opiate analgesic    2. Lumbar radiculopathy    3. Lumbosacral spondylolysis    4. Chronic narcotic use    5. Lumbar spondylosis    6. DDD (degenerative disc disease), lumbar    7. Chronic prescription opiate use       The patient is an 89-year-old female with longstanding history of low back pain.  This is managed with hydrocodone and gabapentin.  Patient states medications provide analgesia improved function.  Here for medication review.  Discussed the safe opiate usage.  Reviewed last urine drug screen.  Medication refilled for July.        Pain is  limiting functional status. Failed conservative treatment consisting of medications, activity modification, and  PT.  1.Risks of long term narcotic use d/w pt including dependence, abuse, and death from overdose and mixing narcotic with alcohol. Pt verbalized understanding.

## 2025-07-31 DIAGNOSIS — M43.07 LUMBOSACRAL SPONDYLOLYSIS: ICD-10-CM

## 2025-07-31 DIAGNOSIS — M47.819 ARTHROPATHY OF FACET JOINT: ICD-10-CM

## 2025-07-31 DIAGNOSIS — M54.16 LUMBAR RADICULOPATHY: ICD-10-CM

## 2025-07-31 DIAGNOSIS — E03.9 ACQUIRED HYPOTHYROIDISM: ICD-10-CM

## 2025-07-31 RX ORDER — GABAPENTIN 300 MG/1
300 CAPSULE ORAL 3 TIMES DAILY
Qty: 90 CAPSULE | Refills: 0 | Status: SHIPPED | OUTPATIENT
Start: 2025-07-31

## 2025-07-31 RX ORDER — LEVOTHYROXINE SODIUM 100 UG/1
100 TABLET ORAL
Qty: 90 TABLET | Refills: 0 | Status: SHIPPED | OUTPATIENT
Start: 2025-07-31

## 2025-08-01 DIAGNOSIS — I10 ESSENTIAL HYPERTENSION: ICD-10-CM

## 2025-08-01 RX ORDER — VALSARTAN 80 MG/1
80 TABLET ORAL DAILY
Qty: 90 TABLET | Refills: 0 | Status: SHIPPED | OUTPATIENT
Start: 2025-08-01

## 2025-08-04 DIAGNOSIS — F11.90 CHRONIC NARCOTIC USE: ICD-10-CM

## 2025-08-04 DIAGNOSIS — Z79.891 LONG TERM (CURRENT) USE OF OPIATE ANALGESIC: ICD-10-CM

## 2025-08-04 DIAGNOSIS — Z79.891 CHRONIC PRESCRIPTION OPIATE USE: ICD-10-CM

## 2025-08-04 DIAGNOSIS — M51.369 DDD (DEGENERATIVE DISC DISEASE), LUMBAR: ICD-10-CM

## 2025-08-04 DIAGNOSIS — M47.816 LUMBAR SPONDYLOSIS: ICD-10-CM

## 2025-08-04 DIAGNOSIS — M43.07 LUMBOSACRAL SPONDYLOLYSIS: ICD-10-CM

## 2025-08-04 DIAGNOSIS — M54.16 LUMBAR RADICULOPATHY: ICD-10-CM

## 2025-08-04 RX ORDER — HYDROCODONE BITARTRATE AND ACETAMINOPHEN 5; 325 MG/1; MG/1
1 TABLET ORAL EVERY 8 HOURS PRN
Qty: 90 TABLET | Refills: 0 | Status: SHIPPED | OUTPATIENT
Start: 2025-08-05 | End: 2025-09-04

## 2025-08-22 DIAGNOSIS — J45.30 MILD PERSISTENT ASTHMA WITHOUT COMPLICATION (HCC): ICD-10-CM

## 2025-08-22 DIAGNOSIS — E78.2 MIXED HYPERLIPIDEMIA: ICD-10-CM

## 2025-08-22 DIAGNOSIS — R80.9 TYPE 2 DIABETES MELLITUS WITH MICROALBUMINURIA, WITHOUT LONG-TERM CURRENT USE OF INSULIN (HCC): ICD-10-CM

## 2025-08-22 DIAGNOSIS — E11.29 TYPE 2 DIABETES MELLITUS WITH MICROALBUMINURIA, WITHOUT LONG-TERM CURRENT USE OF INSULIN (HCC): ICD-10-CM

## 2025-08-23 RX ORDER — ATORVASTATIN CALCIUM 40 MG/1
40 TABLET, FILM COATED ORAL EVERY EVENING
Qty: 90 TABLET | Refills: 0 | Status: SHIPPED | OUTPATIENT
Start: 2025-08-23

## 2025-08-23 RX ORDER — MONTELUKAST SODIUM 10 MG/1
10 TABLET ORAL NIGHTLY
Qty: 90 TABLET | Refills: 1 | Status: SHIPPED | OUTPATIENT
Start: 2025-08-23

## (undated) DIAGNOSIS — M54.16 LUMBAR RADICULOPATHY: ICD-10-CM

## (undated) DIAGNOSIS — M47.819 ARTHROPATHY OF FACET JOINT: ICD-10-CM

## (undated) DIAGNOSIS — E11.29 TYPE 2 DIABETES MELLITUS WITH MICROALBUMINURIA, WITHOUT LONG-TERM CURRENT USE OF INSULIN (HCC): ICD-10-CM

## (undated) DIAGNOSIS — R80.9 TYPE 2 DIABETES MELLITUS WITH MICROALBUMINURIA, WITHOUT LONG-TERM CURRENT USE OF INSULIN (HCC): ICD-10-CM

## (undated) DIAGNOSIS — M43.07 LUMBOSACRAL SPONDYLOLYSIS: ICD-10-CM

## (undated) DIAGNOSIS — E03.9 ACQUIRED HYPOTHYROIDISM: Primary | ICD-10-CM

## (undated) DIAGNOSIS — E78.2 MIXED HYPERLIPIDEMIA: ICD-10-CM

## (undated) DIAGNOSIS — M47.816 LUMBAR FACET ARTHROPATHY: Primary | ICD-10-CM

## (undated) DEVICE — GLOVE SURG SENSICARE SZ 7-1/2

## (undated) DEVICE — NEEDLE SPINAL 22X5 405148

## (undated) DEVICE — GLOVE SURG SENSICARE SZ 7

## (undated) DEVICE — PAIN TRAY: Brand: MEDLINE INDUSTRIES, INC.

## (undated) DEVICE — GLOVE SURG SENSICARE SZ 6-1/2

## (undated) DEVICE — CHLORAPREP ORANGE TINT 10.5ML

## (undated) DEVICE — AVANOS* TUOHY EPIDURAL NEEDLE: Brand: AVANOS

## (undated) DEVICE — APPLICATOR PREP 10.5ML ORNG CHG 2% ISO ALC

## (undated) DEVICE — SYRINGE 10ML SLIP TIP LOSS OF RESIST PLAS

## (undated) DEVICE — REMOVER DURAPREP 3M

## (undated) DEVICE — SKIN REG/FINE DUAL MARKER, RULER, LABELS: Brand: MEDLINE

## (undated) DEVICE — BANDAID COVERLET 1X3

## (undated) DEVICE — MARKER SKIN 2 TIP

## (undated) DEVICE — GLOVE,SURG,SENSICARE,ALOE,LF,PF,7: Brand: MEDLINE

## (undated) DEVICE — GLOVE SUR 7.5 SENSICARE PIP WHT PWD F

## (undated) DEVICE — BANDAGE ADH 1INX3IN NAT FAB N ADH PD CURAD

## (undated) NOTE — LETTER
[facility], [address] [address]  [phone]          01/06/23        Dear Francisca Reardon,    Our office has attempted to contact you via phone and have been unsuccessful. We were calling to discuss your results of the bilateral carotid ultrasound you had on 12/16/22. Results indicate bilateral carotid atherosclerosis. This means you have plaque build up in your carotid arteries in your neck. This is causing narrowing of those arteries which can decrease the blood flow to your brain. Recommendations are as follows:  Refrain from smoking, controlling your blood pressure by continuing your blood pressure medication (valsartan), regular appointments with our office every 6 months and lastly controlling your cholesterol by continuing your cholesterol medication (atorvastatin). Please call the office with any questions.       Sincerely,        Morenita Brown MD

## (undated) NOTE — MR AVS SNAPSHOT
[address]  [address] [place] [address], [address] [place] [id number]               Thank you for choosing us for your health care visit with DAJA Swenson.   We are glad to serve you and happy to provide you with this sum ? To best provide you care, patients receiving routine medications need to be seen at least once a year.  protocol for controlled substances:  Written prescriptions    ? Written prescriptions must be picked up in office. ?  Please allow the offic Iodine [Radiology Contrast Iodinated Dyes] Shortness of Breath    Penicillins Rash    REPORTS ASTHMA ATTACK WITH PCN    Sulfa Antibiotics Rash                Today's Vital Signs     BP Pulse Height Weight BMI    124/80 mmHg 73 62\" 163 lb 29.81 kg/m2 Commonly known as:  EFFEXOR-XR                Where to Get Your Medications      You can get these medications from any pharmacy     Bring a paper prescription for each of these medications    - HYDROcodone-acetaminophen  MG Tabs  - morphINE Sulfate

## (undated) NOTE — Clinical Note
Sam Sorensen,I wanted to send you a quick FYI about Maria Elena Kelly.  I have been seeing her for shingles rash past week; she is having some significant postherpetic neuralgia so I temporarily advised her to increase her nighttime gabapentin dose to 600mg (with same daytim

## (undated) NOTE — LETTER
ASTHMA ACTION PLAN for Taisha Anderson     : 1935     Date: 3/19/2021  Provider:  SUZAN Abdullahi  Phone for doctor or clinic: [facility], [facility], [facility], [address] [address] [address] [place]  [phone]

## (undated) NOTE — Clinical Note
Shannon - I saw Dana today with mixed UI & bulge. I've recommended bladder testing, vag estrogen. I will work to manage her sx. I appreciate the opportunity to participate in her care. Thanks, Ayesha